# Patient Record
Sex: FEMALE | Race: WHITE | NOT HISPANIC OR LATINO | Employment: UNEMPLOYED | URBAN - METROPOLITAN AREA
[De-identification: names, ages, dates, MRNs, and addresses within clinical notes are randomized per-mention and may not be internally consistent; named-entity substitution may affect disease eponyms.]

---

## 2023-02-06 ENCOUNTER — HOSPITAL ENCOUNTER (INPATIENT)
Facility: HOSPITAL | Age: 53
LOS: 11 days | Discharge: HOME | DRG: 189 | End: 2023-02-17
Attending: INTERNAL MEDICINE | Admitting: INTERNAL MEDICINE
Payer: COMMERCIAL

## 2023-02-06 DIAGNOSIS — R94.31 QT PROLONGATION: ICD-10-CM

## 2023-02-06 DIAGNOSIS — R04.2 HEMOPTYSIS: ICD-10-CM

## 2023-02-06 DIAGNOSIS — I48.92 ATRIAL FLUTTER: ICD-10-CM

## 2023-02-06 DIAGNOSIS — J96.01 ACUTE HYPOXEMIC RESPIRATORY FAILURE: Primary | ICD-10-CM

## 2023-02-06 DIAGNOSIS — R00.0 TACHYCARDIA: ICD-10-CM

## 2023-02-06 PROBLEM — I10 ESSENTIAL HYPERTENSION: Status: ACTIVE | Noted: 2023-02-06

## 2023-02-06 PROBLEM — D64.9 ANEMIA: Status: ACTIVE | Noted: 2023-02-06

## 2023-02-06 PROBLEM — E07.9 THYROID DISEASE: Status: ACTIVE | Noted: 2023-02-06

## 2023-02-06 PROBLEM — R91.8 LUNG MASS: Status: ACTIVE | Noted: 2023-02-06

## 2023-02-06 PROBLEM — R91.8 LUNG MASS: Status: RESOLVED | Noted: 2023-02-06 | Resolved: 2023-02-06

## 2023-02-06 LAB
ANISOCYTOSIS BLD QL SMEAR: SLIGHT
APTT BLDCRRT: 23.6 SEC (ref 21–32)
BASOPHILS # BLD AUTO: 0.07 K/UL (ref 0–0.2)
BASOPHILS NFR BLD: 0.2 % (ref 0–1.9)
BURR CELLS BLD QL SMEAR: ABNORMAL
DIFFERENTIAL METHOD: ABNORMAL
EOSINOPHIL # BLD AUTO: 0.1 K/UL (ref 0–0.5)
EOSINOPHIL NFR BLD: 0.2 % (ref 0–8)
ERYTHROCYTE [DISTWIDTH] IN BLOOD BY AUTOMATED COUNT: 17.5 % (ref 11.5–14.5)
HCT VFR BLD AUTO: 24.4 % (ref 37–48.5)
HGB BLD-MCNC: 7.9 G/DL (ref 12–16)
HYPOCHROMIA BLD QL SMEAR: ABNORMAL
IMM GRANULOCYTES # BLD AUTO: 0.64 K/UL (ref 0–0.04)
IMM GRANULOCYTES NFR BLD AUTO: 1.6 % (ref 0–0.5)
INR PPP: 1.2 (ref 0.8–1.2)
LYMPHOCYTES # BLD AUTO: 1.2 K/UL (ref 1–4.8)
LYMPHOCYTES NFR BLD: 3.2 % (ref 18–48)
MAGNESIUM SERPL-MCNC: 1.5 MG/DL (ref 1.6–2.6)
MCH RBC QN AUTO: 28.6 PG (ref 27–31)
MCHC RBC AUTO-ENTMCNC: 32.4 G/DL (ref 32–36)
MCV RBC AUTO: 88 FL (ref 82–98)
MONOCYTES # BLD AUTO: 1.3 K/UL (ref 0.3–1)
MONOCYTES NFR BLD: 3.3 % (ref 4–15)
NEUTROPHILS # BLD AUTO: 35.6 K/UL (ref 1.8–7.7)
NEUTROPHILS NFR BLD: 91.5 % (ref 38–73)
NRBC BLD-RTO: 0 /100 WBC
OVALOCYTES BLD QL SMEAR: ABNORMAL
PHOSPHATE SERPL-MCNC: 4.4 MG/DL (ref 2.7–4.5)
PLATELET # BLD AUTO: 803 K/UL (ref 150–450)
PLATELET BLD QL SMEAR: ABNORMAL
PMV BLD AUTO: 9.3 FL (ref 9.2–12.9)
POIKILOCYTOSIS BLD QL SMEAR: SLIGHT
POLYCHROMASIA BLD QL SMEAR: ABNORMAL
PROTHROMBIN TIME: 12.3 SEC (ref 9–12.5)
RBC # BLD AUTO: 2.76 M/UL (ref 4–5.4)
SCHISTOCYTES BLD QL SMEAR: ABNORMAL
WBC # BLD AUTO: 38.89 K/UL (ref 3.9–12.7)
WBC TOXIC VACUOLES BLD QL SMEAR: PRESENT

## 2023-02-06 PROCEDURE — 94761 N-INVAS EAR/PLS OXIMETRY MLT: CPT

## 2023-02-06 PROCEDURE — 85025 COMPLETE CBC W/AUTO DIFF WBC: CPT | Mod: 91

## 2023-02-06 PROCEDURE — 84100 ASSAY OF PHOSPHORUS: CPT

## 2023-02-06 PROCEDURE — 25000242 PHARM REV CODE 250 ALT 637 W/ HCPCS: Performed by: STUDENT IN AN ORGANIZED HEALTH CARE EDUCATION/TRAINING PROGRAM

## 2023-02-06 PROCEDURE — 85610 PROTHROMBIN TIME: CPT | Mod: 91

## 2023-02-06 PROCEDURE — 27000221 HC OXYGEN, UP TO 24 HOURS

## 2023-02-06 PROCEDURE — 87040 BLOOD CULTURE FOR BACTERIA: CPT | Mod: 59

## 2023-02-06 PROCEDURE — 99900035 HC TECH TIME PER 15 MIN (STAT)

## 2023-02-06 PROCEDURE — 63600175 PHARM REV CODE 636 W HCPCS: Performed by: STUDENT IN AN ORGANIZED HEALTH CARE EDUCATION/TRAINING PROGRAM

## 2023-02-06 PROCEDURE — 20000000 HC ICU ROOM

## 2023-02-06 PROCEDURE — 85730 THROMBOPLASTIN TIME PARTIAL: CPT

## 2023-02-06 PROCEDURE — 25000003 PHARM REV CODE 250: Performed by: STUDENT IN AN ORGANIZED HEALTH CARE EDUCATION/TRAINING PROGRAM

## 2023-02-06 PROCEDURE — 83735 ASSAY OF MAGNESIUM: CPT

## 2023-02-06 RX ORDER — IPRATROPIUM BROMIDE AND ALBUTEROL SULFATE 2.5; .5 MG/3ML; MG/3ML
3 SOLUTION RESPIRATORY (INHALATION) ONCE
Status: DISCONTINUED | OUTPATIENT
Start: 2023-02-06 | End: 2023-02-06

## 2023-02-06 RX ORDER — TRANEXAMIC ACID 100 MG/ML
500 INJECTION, SOLUTION INTRAVENOUS EVERY 8 HOURS
Status: DISCONTINUED | OUTPATIENT
Start: 2023-02-06 | End: 2023-02-06

## 2023-02-06 RX ORDER — CLONAZEPAM 0.5 MG/1
0.5 TABLET ORAL 2 TIMES DAILY
Status: DISCONTINUED | OUTPATIENT
Start: 2023-02-06 | End: 2023-02-07

## 2023-02-06 RX ORDER — BUPROPION HYDROCHLORIDE 150 MG/1
300 TABLET ORAL DAILY
Status: DISCONTINUED | OUTPATIENT
Start: 2023-02-07 | End: 2023-02-17 | Stop reason: HOSPADM

## 2023-02-06 RX ORDER — GABAPENTIN 400 MG/1
400 CAPSULE ORAL 3 TIMES DAILY
Status: DISCONTINUED | OUTPATIENT
Start: 2023-02-06 | End: 2023-02-17 | Stop reason: HOSPADM

## 2023-02-06 RX ORDER — ONDANSETRON 4 MG/1
4 TABLET, ORALLY DISINTEGRATING ORAL EVERY 8 HOURS PRN
Status: DISCONTINUED | OUTPATIENT
Start: 2023-02-06 | End: 2023-02-17 | Stop reason: HOSPADM

## 2023-02-06 RX ORDER — TIZANIDINE 4 MG/1
4 TABLET ORAL 2 TIMES DAILY
Status: DISCONTINUED | OUTPATIENT
Start: 2023-02-06 | End: 2023-02-07

## 2023-02-06 RX ORDER — ACETAMINOPHEN 325 MG/1
650 TABLET ORAL EVERY 6 HOURS PRN
Status: DISCONTINUED | OUTPATIENT
Start: 2023-02-06 | End: 2023-02-17 | Stop reason: HOSPADM

## 2023-02-06 RX ORDER — IPRATROPIUM BROMIDE AND ALBUTEROL SULFATE 2.5; .5 MG/3ML; MG/3ML
3 SOLUTION RESPIRATORY (INHALATION) EVERY 4 HOURS PRN
Status: DISCONTINUED | OUTPATIENT
Start: 2023-02-06 | End: 2023-02-17 | Stop reason: HOSPADM

## 2023-02-06 RX ORDER — TRANEXAMIC ACID 100 MG/ML
500 INJECTION, SOLUTION INTRAVENOUS 3 TIMES DAILY
Status: DISCONTINUED | OUTPATIENT
Start: 2023-02-07 | End: 2023-02-07

## 2023-02-06 RX ORDER — ALUMINUM HYDROXIDE, MAGNESIUM HYDROXIDE, AND SIMETHICONE 2400; 240; 2400 MG/30ML; MG/30ML; MG/30ML
30 SUSPENSION ORAL EVERY 6 HOURS PRN
Status: DISCONTINUED | OUTPATIENT
Start: 2023-02-06 | End: 2023-02-17 | Stop reason: HOSPADM

## 2023-02-06 RX ORDER — PROMETHAZINE HYDROCHLORIDE AND CODEINE PHOSPHATE 6.25; 1 MG/5ML; MG/5ML
5 SOLUTION ORAL EVERY 4 HOURS PRN
Status: DISCONTINUED | OUTPATIENT
Start: 2023-02-06 | End: 2023-02-17 | Stop reason: HOSPADM

## 2023-02-06 RX ORDER — LEVOFLOXACIN 750 MG/1
750 TABLET ORAL DAILY
Status: DISCONTINUED | OUTPATIENT
Start: 2023-02-07 | End: 2023-02-06

## 2023-02-06 RX ORDER — DULOXETIN HYDROCHLORIDE 60 MG/1
60 CAPSULE, DELAYED RELEASE ORAL 2 TIMES DAILY
Status: DISCONTINUED | OUTPATIENT
Start: 2023-02-06 | End: 2023-02-17 | Stop reason: HOSPADM

## 2023-02-06 RX ORDER — IPRATROPIUM BROMIDE AND ALBUTEROL SULFATE 2.5; .5 MG/3ML; MG/3ML
3 SOLUTION RESPIRATORY (INHALATION) ONCE
Status: COMPLETED | OUTPATIENT
Start: 2023-02-06 | End: 2023-02-06

## 2023-02-06 RX ORDER — SODIUM CHLORIDE 0.9 % (FLUSH) 0.9 %
10 SYRINGE (ML) INJECTION
Status: DISCONTINUED | OUTPATIENT
Start: 2023-02-06 | End: 2023-02-17 | Stop reason: HOSPADM

## 2023-02-06 RX ORDER — ATORVASTATIN CALCIUM 20 MG/1
20 TABLET, FILM COATED ORAL DAILY
Status: DISCONTINUED | OUTPATIENT
Start: 2023-02-07 | End: 2023-02-17 | Stop reason: HOSPADM

## 2023-02-06 RX ORDER — LEVOFLOXACIN 750 MG/1
750 TABLET ORAL DAILY
Status: DISCONTINUED | OUTPATIENT
Start: 2023-02-07 | End: 2023-02-17

## 2023-02-06 RX ORDER — FUROSEMIDE 10 MG/ML
40 INJECTION INTRAMUSCULAR; INTRAVENOUS ONCE
Status: COMPLETED | OUTPATIENT
Start: 2023-02-06 | End: 2023-02-06

## 2023-02-06 RX ORDER — LORAZEPAM 2 MG/ML
0.5 INJECTION INTRAMUSCULAR ONCE
Status: DISCONTINUED | OUTPATIENT
Start: 2023-02-07 | End: 2023-02-07

## 2023-02-06 RX ORDER — CLONAZEPAM 0.5 MG/1
0.5 TABLET ORAL 2 TIMES DAILY PRN
Status: DISCONTINUED | OUTPATIENT
Start: 2023-02-06 | End: 2023-02-06

## 2023-02-06 RX ORDER — LEVOTHYROXINE SODIUM 50 UG/1
50 TABLET ORAL
Status: DISCONTINUED | OUTPATIENT
Start: 2023-02-07 | End: 2023-02-17 | Stop reason: HOSPADM

## 2023-02-06 RX ORDER — LOSARTAN POTASSIUM 50 MG/1
100 TABLET ORAL DAILY
Status: DISCONTINUED | OUTPATIENT
Start: 2023-02-07 | End: 2023-02-16

## 2023-02-06 RX ORDER — TIZANIDINE 4 MG/1
4 TABLET ORAL 2 TIMES DAILY PRN
Status: DISCONTINUED | OUTPATIENT
Start: 2023-02-06 | End: 2023-02-06

## 2023-02-06 RX ORDER — TALC
9 POWDER (GRAM) TOPICAL NIGHTLY
Status: DISCONTINUED | OUTPATIENT
Start: 2023-02-06 | End: 2023-02-17 | Stop reason: HOSPADM

## 2023-02-06 RX ADMIN — GABAPENTIN 400 MG: 400 CAPSULE ORAL at 09:02

## 2023-02-06 RX ADMIN — CLONAZEPAM 0.5 MG: 0.5 TABLET ORAL at 09:02

## 2023-02-06 RX ADMIN — FUROSEMIDE 40 MG: 10 INJECTION, SOLUTION INTRAMUSCULAR; INTRAVENOUS at 09:02

## 2023-02-06 RX ADMIN — Medication 9 MG: at 09:02

## 2023-02-06 RX ADMIN — IPRATROPIUM BROMIDE AND ALBUTEROL SULFATE 3 ML: .5; 3 SOLUTION RESPIRATORY (INHALATION) at 11:02

## 2023-02-06 RX ADMIN — AMITRIPTYLINE HYDROCHLORIDE 75 MG: 50 TABLET, FILM COATED ORAL at 09:02

## 2023-02-06 RX ADMIN — PROMETHAZINE HYDROCHLORIDE AND CODEINE PHOSPHATE 5 ML: 6.25; 1 SOLUTION ORAL at 10:02

## 2023-02-06 RX ADMIN — TRANEXAMIC ACID 500 MG: 100 INJECTION, SOLUTION INTRAVENOUS at 11:02

## 2023-02-06 RX ADMIN — DULOXETINE 60 MG: 60 CAPSULE, DELAYED RELEASE ORAL at 10:02

## 2023-02-06 RX ADMIN — ACETAMINOPHEN 650 MG: 325 TABLET ORAL at 10:02

## 2023-02-06 RX ADMIN — TIZANIDINE 4 MG: 4 TABLET ORAL at 09:02

## 2023-02-07 PROBLEM — F32.A ANXIETY AND DEPRESSION: Status: ACTIVE | Noted: 2023-02-07

## 2023-02-07 PROBLEM — G62.9 NEUROPATHY: Status: ACTIVE | Noted: 2023-02-07

## 2023-02-07 PROBLEM — F41.9 ANXIETY AND DEPRESSION: Status: ACTIVE | Noted: 2023-02-07

## 2023-02-07 LAB
ABO + RH BLD: NORMAL
ACID FAST MOD KINY STN SPEC: NORMAL
ALBUMIN SERPL BCP-MCNC: 1.5 G/DL (ref 3.5–5.2)
ALP SERPL-CCNC: 257 U/L (ref 55–135)
ALT SERPL W/O P-5'-P-CCNC: 24 U/L (ref 10–44)
ANION GAP SERPL CALC-SCNC: 15 MMOL/L (ref 8–16)
ANISOCYTOSIS BLD QL SMEAR: SLIGHT
APTT BLDCRRT: 29.9 SEC (ref 21–32)
AST SERPL-CCNC: 15 U/L (ref 10–40)
BASO STIPL BLD QL SMEAR: ABNORMAL
BASOPHILS NFR BLD: 0 % (ref 0–1.9)
BILIRUB SERPL-MCNC: 0.7 MG/DL (ref 0.1–1)
BLD GP AB SCN CELLS X3 SERPL QL: NORMAL
BLD PROD TYP BPU: NORMAL
BLOOD UNIT EXPIRATION DATE: NORMAL
BLOOD UNIT TYPE CODE: 6200
BLOOD UNIT TYPE: NORMAL
BUN SERPL-MCNC: 23 MG/DL (ref 6–20)
BURR CELLS BLD QL SMEAR: ABNORMAL
CALCIUM SERPL-MCNC: 8.8 MG/DL (ref 8.7–10.5)
CHLORIDE SERPL-SCNC: 99 MMOL/L (ref 95–110)
CO2 SERPL-SCNC: 24 MMOL/L (ref 23–29)
CODING SYSTEM: NORMAL
CREAT SERPL-MCNC: 0.8 MG/DL (ref 0.5–1.4)
CROSSMATCH INTERPRETATION: NORMAL
DIFFERENTIAL METHOD: ABNORMAL
DISPENSE STATUS: NORMAL
EOSINOPHIL NFR BLD: 0 % (ref 0–8)
ERYTHROCYTE [DISTWIDTH] IN BLOOD BY AUTOMATED COUNT: 17.5 % (ref 11.5–14.5)
EST. GFR  (NO RACE VARIABLE): >60 ML/MIN/1.73 M^2
GLUCOSE SERPL-MCNC: 160 MG/DL (ref 70–110)
HCT VFR BLD AUTO: 21.2 % (ref 37–48.5)
HGB BLD-MCNC: 6.7 G/DL (ref 12–16)
HYPOCHROMIA BLD QL SMEAR: ABNORMAL
IMM GRANULOCYTES # BLD AUTO: ABNORMAL K/UL (ref 0–0.04)
IMM GRANULOCYTES NFR BLD AUTO: ABNORMAL % (ref 0–0.5)
INR PPP: 1.3 (ref 0.8–1.2)
KOH PREP SPEC: NORMAL
LYMPHOCYTES NFR BLD: 8 % (ref 18–48)
MAGNESIUM SERPL-MCNC: 1.5 MG/DL (ref 1.6–2.6)
MCH RBC QN AUTO: 28 PG (ref 27–31)
MCHC RBC AUTO-ENTMCNC: 31.6 G/DL (ref 32–36)
MCV RBC AUTO: 89 FL (ref 82–98)
MONOCYTES NFR BLD: 4 % (ref 4–15)
MYELOCYTES NFR BLD MANUAL: 1 %
NEUTROPHILS NFR BLD: 80 % (ref 38–73)
NEUTS BAND NFR BLD MANUAL: 7 %
NRBC BLD-RTO: 0 /100 WBC
NUM UNITS TRANS PACKED RBC: NORMAL
OVALOCYTES BLD QL SMEAR: ABNORMAL
PHOSPHATE SERPL-MCNC: 6.4 MG/DL (ref 2.7–4.5)
PLATELET # BLD AUTO: 730 K/UL (ref 150–450)
PLATELET BLD QL SMEAR: ABNORMAL
PMV BLD AUTO: 8.8 FL (ref 9.2–12.9)
POCT GLUCOSE: 98 MG/DL (ref 70–110)
POIKILOCYTOSIS BLD QL SMEAR: SLIGHT
POLYCHROMASIA BLD QL SMEAR: ABNORMAL
POTASSIUM SERPL-SCNC: 4.7 MMOL/L (ref 3.5–5.1)
PROT SERPL-MCNC: 5.6 G/DL (ref 6–8.4)
PROTHROMBIN TIME: 13 SEC (ref 9–12.5)
RBC # BLD AUTO: 2.39 M/UL (ref 4–5.4)
SODIUM SERPL-SCNC: 138 MMOL/L (ref 136–145)
TARGETS BLD QL SMEAR: ABNORMAL
WBC # BLD AUTO: 32.21 K/UL (ref 3.9–12.7)

## 2023-02-07 PROCEDURE — 25000003 PHARM REV CODE 250: Performed by: STUDENT IN AN ORGANIZED HEALTH CARE EDUCATION/TRAINING PROGRAM

## 2023-02-07 PROCEDURE — 25000003 PHARM REV CODE 250: Performed by: INTERNAL MEDICINE

## 2023-02-07 PROCEDURE — 87147 CULTURE TYPE IMMUNOLOGIC: CPT

## 2023-02-07 PROCEDURE — 88305 TISSUE EXAM BY PATHOLOGIST: ICD-10-PCS | Mod: 26,,, | Performed by: PATHOLOGY

## 2023-02-07 PROCEDURE — 31624 PR BRONCHOSCOPY,DIAG2STIC W LAVAGE: ICD-10-PCS | Mod: RT,,, | Performed by: INTERNAL MEDICINE

## 2023-02-07 PROCEDURE — 36569 INSJ PICC 5 YR+ W/O IMAGING: CPT

## 2023-02-07 PROCEDURE — 20000000 HC ICU ROOM

## 2023-02-07 PROCEDURE — 88305 TISSUE EXAM BY PATHOLOGIST: CPT | Performed by: PATHOLOGY

## 2023-02-07 PROCEDURE — 87205 SMEAR GRAM STAIN: CPT | Mod: 59

## 2023-02-07 PROCEDURE — 88312 PR  SPECIAL STAINS,GROUP I: ICD-10-PCS | Mod: 26,,, | Performed by: PATHOLOGY

## 2023-02-07 PROCEDURE — 99900025 HC BRONCHOSCOPY-ASST (STAT)

## 2023-02-07 PROCEDURE — C1751 CATH, INF, PER/CENT/MIDLINE: HCPCS

## 2023-02-07 PROCEDURE — 63600175 PHARM REV CODE 636 W HCPCS

## 2023-02-07 PROCEDURE — 87210 SMEAR WET MOUNT SALINE/INK: CPT | Performed by: STUDENT IN AN ORGANIZED HEALTH CARE EDUCATION/TRAINING PROGRAM

## 2023-02-07 PROCEDURE — 85025 COMPLETE CBC W/AUTO DIFF WBC: CPT

## 2023-02-07 PROCEDURE — 86920 COMPATIBILITY TEST SPIN: CPT | Performed by: STUDENT IN AN ORGANIZED HEALTH CARE EDUCATION/TRAINING PROGRAM

## 2023-02-07 PROCEDURE — 99291 CRITICAL CARE FIRST HOUR: CPT | Mod: 25,,, | Performed by: INTERNAL MEDICINE

## 2023-02-07 PROCEDURE — 99900026 HC AIRWAY MAINTENANCE (STAT)

## 2023-02-07 PROCEDURE — 87205 SMEAR GRAM STAIN: CPT | Performed by: STUDENT IN AN ORGANIZED HEALTH CARE EDUCATION/TRAINING PROGRAM

## 2023-02-07 PROCEDURE — 84100 ASSAY OF PHOSPHORUS: CPT

## 2023-02-07 PROCEDURE — 87015 SPECIMEN INFECT AGNT CONCNTJ: CPT | Performed by: STUDENT IN AN ORGANIZED HEALTH CARE EDUCATION/TRAINING PROGRAM

## 2023-02-07 PROCEDURE — 87102 FUNGUS ISOLATION CULTURE: CPT | Performed by: STUDENT IN AN ORGANIZED HEALTH CARE EDUCATION/TRAINING PROGRAM

## 2023-02-07 PROCEDURE — 31624 DX BRONCHOSCOPE/LAVAGE: CPT | Mod: RT,,, | Performed by: INTERNAL MEDICINE

## 2023-02-07 PROCEDURE — 85610 PROTHROMBIN TIME: CPT

## 2023-02-07 PROCEDURE — 94761 N-INVAS EAR/PLS OXIMETRY MLT: CPT

## 2023-02-07 PROCEDURE — 88112 CYTOPATH CELL ENHANCE TECH: CPT | Mod: 26,,, | Performed by: PATHOLOGY

## 2023-02-07 PROCEDURE — 87070 CULTURE OTHR SPECIMN AEROBIC: CPT

## 2023-02-07 PROCEDURE — 99900035 HC TECH TIME PER 15 MIN (STAT)

## 2023-02-07 PROCEDURE — 88312 SPECIAL STAINS GROUP 1: CPT | Mod: 26,,, | Performed by: PATHOLOGY

## 2023-02-07 PROCEDURE — 88112 PR  CYTOPATH, CELL ENHANCE TECH: ICD-10-PCS | Mod: 26,,, | Performed by: PATHOLOGY

## 2023-02-07 PROCEDURE — 83735 ASSAY OF MAGNESIUM: CPT

## 2023-02-07 PROCEDURE — 25000242 PHARM REV CODE 250 ALT 637 W/ HCPCS: Performed by: STUDENT IN AN ORGANIZED HEALTH CARE EDUCATION/TRAINING PROGRAM

## 2023-02-07 PROCEDURE — 99291 PR CRITICAL CARE, E/M 30-74 MINUTES: ICD-10-PCS | Mod: 25,,, | Performed by: INTERNAL MEDICINE

## 2023-02-07 PROCEDURE — 25000003 PHARM REV CODE 250

## 2023-02-07 PROCEDURE — 87449 NOS EACH ORGANISM AG IA: CPT | Performed by: STUDENT IN AN ORGANIZED HEALTH CARE EDUCATION/TRAINING PROGRAM

## 2023-02-07 PROCEDURE — 25000003 PHARM REV CODE 250: Performed by: NURSE PRACTITIONER

## 2023-02-07 PROCEDURE — 80053 COMPREHEN METABOLIC PANEL: CPT

## 2023-02-07 PROCEDURE — 87206 SMEAR FLUORESCENT/ACID STAI: CPT | Mod: 91 | Performed by: STUDENT IN AN ORGANIZED HEALTH CARE EDUCATION/TRAINING PROGRAM

## 2023-02-07 PROCEDURE — P9016 RBC LEUKOCYTES REDUCED: HCPCS | Performed by: STUDENT IN AN ORGANIZED HEALTH CARE EDUCATION/TRAINING PROGRAM

## 2023-02-07 PROCEDURE — 63600175 PHARM REV CODE 636 W HCPCS: Performed by: STUDENT IN AN ORGANIZED HEALTH CARE EDUCATION/TRAINING PROGRAM

## 2023-02-07 PROCEDURE — 31624 DX BRONCHOSCOPE/LAVAGE: CPT

## 2023-02-07 PROCEDURE — 87305 ASPERGILLUS AG IA: CPT | Performed by: STUDENT IN AN ORGANIZED HEALTH CARE EDUCATION/TRAINING PROGRAM

## 2023-02-07 PROCEDURE — 87116 MYCOBACTERIA CULTURE: CPT | Performed by: STUDENT IN AN ORGANIZED HEALTH CARE EDUCATION/TRAINING PROGRAM

## 2023-02-07 PROCEDURE — 36430 TRANSFUSION BLD/BLD COMPNT: CPT

## 2023-02-07 PROCEDURE — 27000221 HC OXYGEN, UP TO 24 HOURS

## 2023-02-07 PROCEDURE — 88112 CYTOPATH CELL ENHANCE TECH: CPT | Performed by: PATHOLOGY

## 2023-02-07 PROCEDURE — 86900 BLOOD TYPING SEROLOGIC ABO: CPT | Performed by: STUDENT IN AN ORGANIZED HEALTH CARE EDUCATION/TRAINING PROGRAM

## 2023-02-07 PROCEDURE — 87206 SMEAR FLUORESCENT/ACID STAI: CPT | Performed by: STUDENT IN AN ORGANIZED HEALTH CARE EDUCATION/TRAINING PROGRAM

## 2023-02-07 PROCEDURE — 87106 FUNGI IDENTIFICATION YEAST: CPT | Performed by: STUDENT IN AN ORGANIZED HEALTH CARE EDUCATION/TRAINING PROGRAM

## 2023-02-07 PROCEDURE — 85730 THROMBOPLASTIN TIME PARTIAL: CPT

## 2023-02-07 PROCEDURE — 88312 SPECIAL STAINS GROUP 1: CPT | Performed by: PATHOLOGY

## 2023-02-07 PROCEDURE — 88305 TISSUE EXAM BY PATHOLOGIST: CPT | Mod: 26,,, | Performed by: PATHOLOGY

## 2023-02-07 PROCEDURE — A4216 STERILE WATER/SALINE, 10 ML: HCPCS | Performed by: NURSE PRACTITIONER

## 2023-02-07 PROCEDURE — 94640 AIRWAY INHALATION TREATMENT: CPT

## 2023-02-07 PROCEDURE — 63600175 PHARM REV CODE 636 W HCPCS: Performed by: INTERNAL MEDICINE

## 2023-02-07 RX ORDER — MIDAZOLAM HYDROCHLORIDE 1 MG/ML
INJECTION INTRAMUSCULAR; INTRAVENOUS
Status: COMPLETED
Start: 2023-02-07 | End: 2023-02-07

## 2023-02-07 RX ORDER — LIDOCAINE HYDROCHLORIDE 10 MG/ML
INJECTION INFILTRATION; PERINEURAL
Status: DISPENSED
Start: 2023-02-07 | End: 2023-02-08

## 2023-02-07 RX ORDER — SODIUM CHLORIDE 0.9 % (FLUSH) 0.9 %
10 SYRINGE (ML) INJECTION
Status: DISCONTINUED | OUTPATIENT
Start: 2023-02-07 | End: 2023-02-17 | Stop reason: HOSPADM

## 2023-02-07 RX ORDER — MUPIROCIN 20 MG/G
OINTMENT TOPICAL 2 TIMES DAILY
Status: DISPENSED | OUTPATIENT
Start: 2023-02-07 | End: 2023-02-12

## 2023-02-07 RX ORDER — FENTANYL CITRATE 50 UG/ML
INJECTION, SOLUTION INTRAMUSCULAR; INTRAVENOUS
Status: COMPLETED
Start: 2023-02-07 | End: 2023-02-07

## 2023-02-07 RX ORDER — LIDOCAINE HYDROCHLORIDE 20 MG/ML
5 SOLUTION OROPHARYNGEAL EVERY 6 HOURS
Status: DISCONTINUED | OUTPATIENT
Start: 2023-02-07 | End: 2023-02-08

## 2023-02-07 RX ORDER — MAGNESIUM SULFATE HEPTAHYDRATE 40 MG/ML
2 INJECTION, SOLUTION INTRAVENOUS
Status: COMPLETED | OUTPATIENT
Start: 2023-02-07 | End: 2023-02-07

## 2023-02-07 RX ORDER — LIDOCAINE HYDROCHLORIDE 10 MG/ML
INJECTION, SOLUTION EPIDURAL; INFILTRATION; INTRACAUDAL; PERINEURAL
Status: COMPLETED
Start: 2023-02-07 | End: 2023-02-07

## 2023-02-07 RX ORDER — CLONAZEPAM 0.5 MG/1
0.5 TABLET ORAL 3 TIMES DAILY PRN
Status: DISCONTINUED | OUTPATIENT
Start: 2023-02-07 | End: 2023-02-17 | Stop reason: HOSPADM

## 2023-02-07 RX ORDER — FENTANYL CITRATE 50 UG/ML
50 INJECTION, SOLUTION INTRAMUSCULAR; INTRAVENOUS ONCE
Status: COMPLETED | OUTPATIENT
Start: 2023-02-07 | End: 2023-02-07

## 2023-02-07 RX ORDER — MIDAZOLAM HYDROCHLORIDE 1 MG/ML
2 INJECTION INTRAMUSCULAR; INTRAVENOUS ONCE
Status: COMPLETED | OUTPATIENT
Start: 2023-02-07 | End: 2023-02-07

## 2023-02-07 RX ORDER — HYDROCODONE BITARTRATE AND ACETAMINOPHEN 500; 5 MG/1; MG/1
TABLET ORAL
Status: DISCONTINUED | OUTPATIENT
Start: 2023-02-07 | End: 2023-02-10

## 2023-02-07 RX ORDER — SODIUM CHLORIDE 0.9 % (FLUSH) 0.9 %
10 SYRINGE (ML) INJECTION EVERY 6 HOURS
Status: DISCONTINUED | OUTPATIENT
Start: 2023-02-07 | End: 2023-02-17 | Stop reason: HOSPADM

## 2023-02-07 RX ORDER — MIDAZOLAM HYDROCHLORIDE 1 MG/ML
6 INJECTION INTRAMUSCULAR; INTRAVENOUS
Status: DISCONTINUED | OUTPATIENT
Start: 2023-02-07 | End: 2023-02-08

## 2023-02-07 RX ORDER — FENTANYL CITRATE 50 UG/ML
150 INJECTION, SOLUTION INTRAMUSCULAR; INTRAVENOUS
Status: DISCONTINUED | OUTPATIENT
Start: 2023-02-07 | End: 2023-02-08

## 2023-02-07 RX ORDER — TIZANIDINE 2 MG/1
4 TABLET ORAL 2 TIMES DAILY PRN
Status: DISCONTINUED | OUTPATIENT
Start: 2023-02-07 | End: 2023-02-17 | Stop reason: HOSPADM

## 2023-02-07 RX ADMIN — PROMETHAZINE HYDROCHLORIDE AND CODEINE PHOSPHATE 5 ML: 6.25; 1 SOLUTION ORAL at 01:02

## 2023-02-07 RX ADMIN — AMITRIPTYLINE HYDROCHLORIDE 75 MG: 50 TABLET, FILM COATED ORAL at 09:02

## 2023-02-07 RX ADMIN — ATORVASTATIN CALCIUM 20 MG: 20 TABLET, FILM COATED ORAL at 08:02

## 2023-02-07 RX ADMIN — MIDAZOLAM HYDROCHLORIDE 2 MG: 1 INJECTION INTRAMUSCULAR; INTRAVENOUS at 04:02

## 2023-02-07 RX ADMIN — TRANEXAMIC ACID 500 MG: 100 INJECTION, SOLUTION INTRAVENOUS at 08:02

## 2023-02-07 RX ADMIN — DULOXETINE 60 MG: 60 CAPSULE, DELAYED RELEASE ORAL at 08:02

## 2023-02-07 RX ADMIN — Medication 10 ML: at 12:02

## 2023-02-07 RX ADMIN — IPRATROPIUM BROMIDE AND ALBUTEROL SULFATE 3 ML: .5; 3 SOLUTION RESPIRATORY (INHALATION) at 02:02

## 2023-02-07 RX ADMIN — VANCOMYCIN HYDROCHLORIDE 1500 MG: 1.5 INJECTION, POWDER, LYOPHILIZED, FOR SOLUTION INTRAVENOUS at 03:02

## 2023-02-07 RX ADMIN — LIDOCAINE HYDROCHLORIDE: 10 INJECTION, SOLUTION EPIDURAL; INFILTRATION; INTRACAUDAL at 05:02

## 2023-02-07 RX ADMIN — CLONAZEPAM 0.5 MG: 0.5 TABLET ORAL at 09:02

## 2023-02-07 RX ADMIN — Medication 10 ML: at 06:02

## 2023-02-07 RX ADMIN — LOSARTAN POTASSIUM 100 MG: 50 TABLET, FILM COATED ORAL at 08:02

## 2023-02-07 RX ADMIN — FENTANYL CITRATE 150 MCG: 50 INJECTION, SOLUTION INTRAMUSCULAR; INTRAVENOUS at 05:02

## 2023-02-07 RX ADMIN — LIDOCAINE HYDROCHLORIDE 5 ML: 20 SOLUTION ORAL; TOPICAL at 06:02

## 2023-02-07 RX ADMIN — LEVOTHYROXINE SODIUM 50 MCG: 50 TABLET ORAL at 05:02

## 2023-02-07 RX ADMIN — BUPROPION HYDROCHLORIDE 300 MG: 150 TABLET, FILM COATED, EXTENDED RELEASE ORAL at 08:02

## 2023-02-07 RX ADMIN — LEVOFLOXACIN 750 MG: 750 TABLET, FILM COATED ORAL at 08:02

## 2023-02-07 RX ADMIN — FENTANYL CITRATE 50 MCG: 50 INJECTION, SOLUTION INTRAMUSCULAR; INTRAVENOUS at 04:02

## 2023-02-07 RX ADMIN — MUPIROCIN: 20 OINTMENT TOPICAL at 08:02

## 2023-02-07 RX ADMIN — MAGNESIUM SULFATE 2 G: 2 INJECTION INTRAVENOUS at 08:02

## 2023-02-07 RX ADMIN — FENTANYL CITRATE 50 MCG: 50 INJECTION INTRAMUSCULAR; INTRAVENOUS at 04:02

## 2023-02-07 RX ADMIN — TRANEXAMIC ACID 500 MG: 100 INJECTION, SOLUTION INTRAVENOUS at 02:02

## 2023-02-07 RX ADMIN — GABAPENTIN 400 MG: 400 CAPSULE ORAL at 08:02

## 2023-02-07 RX ADMIN — GABAPENTIN 400 MG: 400 CAPSULE ORAL at 09:02

## 2023-02-07 RX ADMIN — CLONAZEPAM 0.5 MG: 0.5 TABLET ORAL at 08:02

## 2023-02-07 RX ADMIN — MIDAZOLAM HYDROCHLORIDE 2 MG: 1 INJECTION INTRAMUSCULAR; INTRAVENOUS at 05:02

## 2023-02-07 RX ADMIN — DULOXETINE 60 MG: 60 CAPSULE, DELAYED RELEASE ORAL at 09:02

## 2023-02-07 RX ADMIN — TIZANIDINE 4 MG: 4 TABLET ORAL at 08:02

## 2023-02-07 RX ADMIN — MAGNESIUM SULFATE 2 G: 2 INJECTION INTRAVENOUS at 10:02

## 2023-02-07 RX ADMIN — MIDAZOLAM 2 MG: 1 INJECTION INTRAMUSCULAR; INTRAVENOUS at 04:02

## 2023-02-07 NOTE — PLAN OF CARE
[facility] - Cardiac Medical [facility]  Initial Discharge Assessment       Primary Care Provider: Primary Doctor No    Admission Diagnosis: Hemoptysis [R04.2]    Admission Date: 2/6/2023  Expected Discharge Date: 2/11/2023    Discharge Barriers Identified: None    Payor: BLUE CROSS BLUE SHIELD / Plan: BCBS ALL OUT OF STATE / Product Type: PPO /     Extended Emergency Contact Information  Primary Emergency Contact: JAYNE ERNANDEZ  Mobile Phone: [phone]  Relation: Spouse  Preferred language: English   needed? No    Discharge Plan A: Home with family  Discharge Plan B: Home Health      [id number] - [place], [place] - [address]  [address]  [place] [address]  Phone: [phone] Fax: [phone]      Initial Assessment (most recent)       Adult Discharge Assessment - 02/07/23 1628          Discharge Assessment    Assessment Type Discharge Planning Assessment     Confirmed/corrected address, phone number and insurance Yes     Confirmed Demographics Correct on Facesheet     Source of Information family     If unable to respond/provide information was family/caregiver contacted? Yes     Contact Name/Number Jayne Ernandez spouse/cp# [phone]     When was your last doctors appointment? 12/21/22     Communicated MICHELE with patient/caregiver Date not available/Unable to determine     Reason For Admission Hemoptysis     People in Home spouse     Do you expect to return to your current living situation? Yes     Do you have help at home or someone to help you manage your care at home? Yes     Who are your caregiver(s) and their phone number(s)? Jayne Ernandez spouse/cp# [phone]     Prior to hospitilization cognitive status: Alert/Oriented     Current cognitive status: Alert/Oriented     Home Layout Able to live on 1st floor     Equipment Currently Used at Home none     Readmission within 30 days? No     Patient currently being followed by outpatient case management? No     Do you currently have service(s) that  help you manage your care at home? No     Do you take prescription medications? Yes     Do you have prescription coverage? Yes     Coverage BCBS/BCBS all out of state     Do you have any problems affording any of your prescribed medications? No     Is the patient taking medications as prescribed? yes     Who is going to help you get home at discharge? Spouse     How do you get to doctors appointments? car, drives self     Are you on dialysis? No     Do you take coumadin? No     Discharge Plan A Home with family     Discharge Plan B Home Health     DME Needed Upon Discharge  other (see comments)   TBD    Discharge Plan discussed with: Spouse/sig other     Name(s) and Number(s) Anthony Ernandez, /cp# [phone]     Discharge Barriers Identified None        Physical Activity    On average, how many days per week do you engage in moderate to strenuous exercise (like a brisk walk)? Patient refused     On average, how many minutes do you engage in exercise at this level? Patient refused        Financial Resource Strain    How hard is it for you to pay for the very basics like food, housing, medical care, and heating? Not hard at all        Housing Stability    In the last 12 months, was there a time when you were not able to pay the mortgage or rent on time? No     In the last 12 months, how many places have you lived? 1     In the last 12 months, was there a time when you did not have a steady place to sleep or slept in a shelter (including now)? No        Transportation Needs    In the past 12 months, has lack of transportation kept you from medical appointments or from getting medications? No     In the past 12 months, has lack of transportation kept you from meetings, work, or from getting things needed for daily living? No        Food Insecurity    Within the past 12 months, you worried that your food would run out before you got the money to buy more. Never true     Within the past 12 months, the food you  "bought just didn't last and you didn't have money to get more. Patient refused        Stress    Do you feel stress - tense, restless, nervous, or anxious, or unable to sleep at night because your mind is troubled all the time - these days? To some extent        Social Connections    In a typical week, how many times do you talk on the phone with family, friends, or neighbors? Three times a week     How often do you get together with friends or relatives? --   " not very often".    How often do you attend Oriental orthodox or Oriental orthodox services? --   " not in years".    Do you belong to any clubs or organizations such as Oriental orthodox groups, unions, fraternal or athletic groups, or school groups? No     How often do you attend meetings of the clubs or organizations you belong to? Never     Are you , , , , never , or living with a partner?         Alcohol Use    Q2: How many drinks containing alcohol do you have on a typical day when you are drinking? Patient does not drink        OTHER    Name(s) of People in Home Anthony Ernandez, spouse                   Spoke to Anthony Ernandez, spouse.  Patient lives with spouse. Post hospital stay Anthony will be her support person and help in the home.  Patient has transportation at discharge with spouse.  There have been no hospitalizations within the last 30 days per spouse. Verified patient's PCP and preferred Pharmacy.  Spouse states not on Coumadin and is not receiving dialysis.  All questions answered regarding Case Management Discharge Planning, spouse verbalized understanding.  SW will continue to follow and assist with post acute care need (s) while patient remains on MICU.    Vishal Lemon LMSW  [facility]  [phone]                     "

## 2023-02-07 NOTE — PLAN OF CARE
CMICU DAILY GOALS       A: Awake    RASS: Goal -    Actual -     Restraint necessity:    B: Breathe   SBT: Not intubated   C: Coordinate A & B, analgesics/sedatives   Pain: managed    SAT: Pass  D: Delirium   CAM-ICU:    E: Early(intubated/ Progressive (non-intubated) Mobility   MOVE Screen: Pass   Activity: Activity Management: Leg kicks - L2  FAS: Feeding/Nutrition   Diet order: Diet/Nutrition Received: NPO,    T: Thrombus   DVT prophylaxis: VTE Required Core Measure: (SCDs) Sequential compression device initiated/maintained  H: HOB Elevation   Head of Bed (HOB) Positioning: HOB at 30-45 degrees  U: Ulcer Prophylaxis   GI: yes  G: Glucose control   managed    S: Skin      Device Skin Pressure Protection: skin-to-device areas padded, skin-to-skin areas padded  Pressure Reduction Devices: specialty bed utilized  Pressure Reduction Techniques: frequent weight shift encouraged  Skin Protection: adhesive use limited  B: Bowel Function   no issues   I: Indwelling Catheters   Horton necessity:     CVC necessity: Yes  D: De-escalation Antibiotics   Yes    Family/Goals of care/Code Status   Code Status: Full Code    24H Vital Sign Range  Temp:  [97.6 °F (36.4 °C)-101.9 °F (38.8 °C)]   Pulse:  []   Resp:  [22-44]   BP: ()/(50-81)   SpO2:  [80 %-100 %]      Shift Events   No acute events throughout shift    VS and assessment per flow sheet, patient progressing towards goals as tolerated, plan of care reviewed with family, all concerns addressed, will continue to monitor.    Camron Hunter

## 2023-02-07 NOTE — ASSESSMENT & PLAN NOTE
Hg 6.8 at outside hospital, was given 1 unit pRBC   Here with hemoptysis   Repeat Hb noted at 6.7, receiving another unit of pRBC's    - CBC daily   - FU coags   - Transfuse if Hg <7, platelets <10, or platelets <50 and actively bleeding

## 2023-02-07 NOTE — ASSESSMENT & PLAN NOTE
Patient with cough for a week a coughed up a cup of blood in the last 24 hours   Not on blood thinners, non smoker, no lung disease history   CTA at outside hospital negative for PE but there was a solid mass in the superior aspect of the right lung concerning for malignancy.  Mass is partially necrotic with air bubbles peripherally.    At outside hospital Hg 6.8, was given 1 unit pRBC   Was also given nebulized TXA at outside hospital     - Nebulized TXA TID  - Monitor oxygen   - Intubation watch (if oxygen sats drop or unable to protect airway)  - Possible bronch with biopsy of mass   - Consult IR for possible emoblization if uncontrolled bleeding   - prn codeine-promethazine for cough  - Trend CBC   - monitor coags

## 2023-02-07 NOTE — SUBJECTIVE & OBJECTIVE
Interval History/Significant Events: Hb noted to be 6.7 overnight, receiving 1 unit of pRBC's. She fevered to 101.9 with a leukocytosis, continuing levofloxacin and vancomycin. Remains HDS and satting well on 4L NC. Patient reports improvement in cough since receiving cough syrup and states the last blood tinged sputum was noted at 3 am.     Review of Systems   Constitutional:  Negative for chills, fever and unexpected weight change.   HENT:  Positive for rhinorrhea and sore throat. Negative for congestion.    Eyes:  Negative for photophobia, pain and visual disturbance.   Respiratory:  Positive for cough (hemoptysis) and shortness of breath (RUSSELL). Negative for chest tightness and wheezing.    Cardiovascular:  Negative for chest pain and leg swelling.        Orthopnea   Gastrointestinal:  Negative for abdominal pain, blood in stool, diarrhea, nausea and vomiting.   Genitourinary:  Negative for dysuria, frequency and hematuria.        Stress incontinence, particularly with coughing   Neurological:  Negative for dizziness, syncope, light-headedness and headaches.   Psychiatric/Behavioral:  Negative for confusion and decreased concentration.    Objective:     Vital Signs (Most Recent):  Temp: 98 °F (36.7 °C) (02/07/23 0730)  Pulse: 98 (02/07/23 0801)  Resp: (!) 27 (02/07/23 0801)  BP: (!) 146/68 (02/07/23 0822)  SpO2: 100 % (02/07/23 0801)   Vital Signs (24h Range):  Temp:  [97.6 °F (36.4 °C)-101.9 °F (38.8 °C)] 98 °F (36.7 °C)  Pulse:  [] 98  Resp:  [22-44] 27  SpO2:  [93 %-100 %] 100 %  BP: ()/(50-81) 146/68   Weight: 99 kg (218 lb 4.1 oz)  Body mass index is 36.32 kg/m².      Intake/Output Summary (Last 24 hours) at 2/7/2023 0919  Last data filed at 2/7/2023 0701  Gross per 24 hour   Intake 248.66 ml   Output 450 ml   Net -201.34 ml       Physical Exam  Vitals and nursing note reviewed.   Constitutional:       General: She is not in acute distress.     Appearance: She is obese. She is not ill-appearing  or diaphoretic.   HENT:      Head: Normocephalic and atraumatic.      Right Ear: External ear normal.      Left Ear: External ear normal.      Nose: Nose normal.      Comments: Nasal cannula in place, no obvious bleeding in nares or mouth     Mouth/Throat:      Mouth: Mucous membranes are dry.      Pharynx: Oropharynx is clear. No oropharyngeal exudate or posterior oropharyngeal erythema.      Comments: No obvious bleeding in mouth or throat  Eyes:      General: No scleral icterus.        Right eye: No discharge.         Left eye: No discharge.      Extraocular Movements: Extraocular movements intact.      Conjunctiva/sclera: Conjunctivae normal.   Cardiovascular:      Rate and Rhythm: Normal rate and regular rhythm.      Heart sounds: Normal heart sounds. No murmur heard.  Pulmonary:      Effort: Pulmonary effort is normal. No respiratory distress.      Breath sounds: Wheezing (R > L) and rhonchi present. No rales.   Abdominal:      General: Abdomen is flat. There is no distension.      Palpations: Abdomen is soft. There is no mass.      Tenderness: There is no abdominal tenderness. There is no guarding or rebound.   Musculoskeletal:         General: No swelling or deformity. Normal range of motion.      Cervical back: Normal range of motion and neck supple.      Right lower leg: No edema.      Left lower leg: No edema.   Skin:     General: Skin is warm.      Coloration: Skin is pale. Skin is not jaundiced.      Findings: No bruising.   Neurological:      Mental Status: She is alert and oriented to person, place, and time.   Psychiatric:         Mood and Affect: Mood normal.         Behavior: Behavior normal.         Thought Content: Thought content normal.       Vents:     Lines/Drains/Airways       Peripherally Inserted Central Catheter Line  Duration             PICC Triple Lumen 02/07/23 0200 right basilic <1 day                  Significant Labs:    CBC/Anemia Profile:  Recent Labs   Lab 02/06/23  0835  02/06/23 1937 02/07/23  0246   WBC 34.89* 38.89* 32.21*   HGB 6.8* 7.9* 6.7*   HCT 21.2* 24.4* 21.2*   * 803* 730*   MCV 89 88 89   RDW 17.2* 17.5* 17.5*        Chemistries:  Recent Labs   Lab 02/06/23  0835 02/06/23 1937 02/07/23 0246     --  138   K 4.0  --  4.7     --  99   CO2 28  --  24   BUN 27*  --  23*   CREATININE 0.77  --  0.8   CALCIUM 9.1  --  8.8   ALBUMIN 3.3*  --  1.5*   PROT 7.3  --  5.6*   BILITOT 0.7  --  0.7   ALKPHOS 347*  --  257*   ALT 38  --  24   AST 31  --  15   MG  --  1.5* 1.5*   PHOS  --  4.4 6.4*       All pertinent labs within the past 24 hours have been reviewed.    Significant Imaging:  I have reviewed all pertinent imaging results/findings within the past 24 hours.

## 2023-02-07 NOTE — H&P
[facility] - Cardiac Medical ICU  Critical Care Medicine  History & Physical    Patient Name: Skyla Ernandez  MRN: [id number]  Admission Date: 2/6/2023  Hospital Length of Stay: 0 days  Code Status: Full Code  Attending Physician: Keely Vallejo MD   Primary Care Provider: Primary Doctor No   Principal Problem: Hemoptysis    Subjective:     HPI:  51 yo female with hypertension, hyperlipidemia, and thyroid disease who presented to [facility] Emergency Department this morning for hemoptysis. She reports 3 weeks of cough in which she first noticed blood tinged sputum 2 weeks ago. She reports that the cough has been productive of whitish brown sputum which became darker and eventually appeared dark red. The cough often interferes with sleep. She has also experienced shortness of breath over the past couple of days. She endorses sore throat, RUSSELL, and 2-3 pillow orthopnea. This morning she coughed up over a cup of bright red blood and decided to present to the ED. She denies any chronic tobacco history apart from occasional smoking cigarettes in her late teens. She denies piror homelessness, incarceration, close contacts with TB, or recent travel. She denies having a history of asthma, COPD, cirrhosis or nose bleeds but does report history of scoliosis.    Workup at the [facility] ED includes leukocytosis to 35K, hemoglobin of 6.8 now s/p 1 unit of pRBC's, thrombocytosis to 882, NT-proBNP 1640, troponin negative, and lactic acid 1.8. CXR diffuse patchy opacities scattered throughout the right middle and right lower lung zone which may be seen with pneumonia, aspiration, or edema. CTA of the chest showed no evidence of pulmonary embolism. Solid mass in the superior aspect of the right lung concerning for malignancy. Mass is partially necrotic with air bubbles peripherally. Mass abuts the pericardium and mediastinum. There is associated atelectasis/consolidation of the right middle lobe. Patchy opacities  in the right lower lobe as well as the left lung which is nonspecific but may represent atelectasis, infectious process, or hemorrhage. She was started on levofloxacin. She was noted to be tachycardic to 100's with soft pressures 87//73 and stable oxygenation on 2LNC.    Patient transferred to [facility] medical intensive care unit for higher level of care.      Hospital/ICU Course:  No notes on file     Past Medical History:   Diagnosis Date    Hypertension     Mixed hyperlipidemia     Thyroid disease        Past Surgical History:   Procedure Laterality Date    ABDOMINAL SURGERY      TONSILLECTOMY         Review of patient's allergies indicates:   Allergen Reactions    Penicillins Hives       Family History    None       Tobacco Use    Smoking status: Former     Types: Cigarettes    Smokeless tobacco: Never   Substance and Sexual Activity    Alcohol use: Never    Drug use: Not on file    Sexual activity: Not on file      Review of Systems   Constitutional:  Negative for chills, fever and unexpected weight change.   HENT:  Positive for rhinorrhea and sore throat. Negative for congestion.    Eyes:  Negative for photophobia, pain and visual disturbance.   Respiratory:  Positive for cough (hemoptysis) and shortness of breath (RUSSELL). Negative for chest tightness and wheezing.    Cardiovascular:  Negative for chest pain.        Orthopnea   Gastrointestinal:  Negative for abdominal pain, blood in stool, diarrhea, nausea and vomiting.   Genitourinary:  Negative for dysuria, frequency and hematuria.   Neurological:  Negative for dizziness, syncope, light-headedness and headaches.   Psychiatric/Behavioral:  Negative for confusion and decreased concentration.    Objective:     Vital Signs (Most Recent):  Temp: 98.9 °F (37.2 °C) (02/06/23 1836)  Pulse: (!) 127 (02/06/23 1836)  Resp: (!) 29 (02/06/23 1836)  BP: 137/70 (02/06/23 1836)  SpO2: 99 % (02/06/23 1836)   Vital Signs (24h Range):  Temp:  [97.6 °F (36.4 °C)-98.9  °F (37.2 °C)] 98.9 °F (37.2 °C)  Pulse:  [] 127  Resp:  [22-38] 29  SpO2:  [80 %-100 %] 99 %  BP: ()/(50-73) 137/70   Weight: 99 kg (218 lb 4.1 oz)  Body mass index is 36.32 kg/m².    No intake or output data in the 24 hours ending 02/06/23 1850    Physical Exam  Vitals and nursing note reviewed.   Constitutional:       General: She is not in acute distress.     Appearance: She is obese. She is not ill-appearing or diaphoretic.   HENT:      Head: Normocephalic and atraumatic.      Right Ear: External ear normal.      Left Ear: External ear normal.      Nose: Nose normal. No congestion or rhinorrhea.      Comments: Nasal cannula in place, no obvious bleeding in nares or mouth     Mouth/Throat:      Mouth: Mucous membranes are dry.      Pharynx: Oropharynx is clear. No oropharyngeal exudate or posterior oropharyngeal erythema.      Comments: No obvious bleeding in mouth or throat  Eyes:      General: No scleral icterus.        Right eye: No discharge.         Left eye: No discharge.      Extraocular Movements: Extraocular movements intact.      Conjunctiva/sclera: Conjunctivae normal.   Cardiovascular:      Rate and Rhythm: Regular rhythm. Tachycardia present.      Heart sounds: Normal heart sounds. No murmur heard.  Pulmonary:      Effort: Pulmonary effort is normal. No respiratory distress.      Breath sounds: Wheezing (L > R) and rhonchi present. No rales.   Abdominal:      General: Abdomen is flat. There is no distension.      Palpations: Abdomen is soft. There is no mass.      Tenderness: There is no abdominal tenderness.   Musculoskeletal:         General: No swelling or deformity. Normal range of motion.      Cervical back: Normal range of motion and neck supple.      Right lower leg: No edema.      Left lower leg: No edema.   Skin:     General: Skin is warm.      Coloration: Skin is pale. Skin is not jaundiced.      Findings: No bruising.   Neurological:      Mental Status: She is alert and  oriented to person, place, and time.   Psychiatric:         Mood and Affect: Mood normal.         Behavior: Behavior normal.         Thought Content: Thought content normal.       Vents:     Lines/Drains/Airways       None                 Significant Labs:    CBC/Anemia Profile:  Recent Labs   Lab 02/06/23  0835   WBC 34.89*   HGB 6.8*   HCT 21.2*   *   MCV 89   RDW 17.2*        Chemistries:  Recent Labs   Lab 02/06/23  0835      K 4.0      CO2 28   BUN 27*   CREATININE 0.77   CALCIUM 9.1   ALBUMIN 3.3*   PROT 7.3   BILITOT 0.7   ALKPHOS 347*   ALT 38   AST 31       All pertinent labs within the past 24 hours have been reviewed.    Significant Imaging: I have reviewed all pertinent imaging results/findings within the past 24 hours.    Assessment/Plan:     Pulmonary  * Hemoptysis  Patient with cough for a week a coughed up a cup of blood in the last 24 hours   Not on blood thinners, non smoker, no lung disease history   CTA at outside hospital negative for PE but there was a solid mass in the superior aspect of the right lung concerning for malignancy.  Mass is partially necrotic with air bubbles peripherally.    At outside hospital Hg 6.8, was given 1 unit pRBC   Was also given nebulized TXA at outside hospital     - Monitor oxygen   - Intubation watch (if oxygen sats drop or unable to protect airway)  - Possible bronch with biopsy of mass   - Consult IR for possible emoblization if bleeding does not stop   - Can consider TXA if bleeding does not stop   - Trend CBC   - FU coags     Acute hypoxemic respiratory failure  Likely due to hemoptysis, lung mass, and possible pneumonia     - Vanc/levofloxacin   - CBC daily   - Possible bronch   - Supplemental O2 as needed   - Intubation watch     Mass of upper lobe of right lung  CTA at outside hospital negative for PE but there was a solid mass in the superior aspect of the right lung concerning for malignancy.  Mass is partially necrotic with air bubbles  peripherally.    WBC 35 and lacate normal at outside hospital     - Vanc/levofloxacin   - FU cultures   - Possible bronch with biopsy     Cardiac/Vascular  Essential hypertension  - hold home antihypertensives in setting of soft blood pressures    Oncology  Anemia  Hg 6.8 at outside hospital, was given 1 unit pRBC   Here with hemoptysis     - CBC daily   - FU coags   - Transfuse if Hg <7, platelets <10, or platelets <50 and actively bleeding     Endocrine  Thyroid disease  - Continue home synthroid         Critical Care Daily Checklist:    A: Awake: RASS Goal/Actual Goal:    Actual:     B: Spontaneous Breathing Trial Performed?     C: SAT & SBT Coordinated?  NA                      D: Delirium: CAM-ICU     E: Early Mobility Performed? Yes   F: Feeding Goal:    Status:     Current Diet Order   Procedures    Diet NPO Except for: Medication, Sips with Medication, Ice Chips     Order Specific Question:   Except for     Answer:   Medication     Order Specific Question:   Except for     Answer:   Sips with Medication     Order Specific Question:   Except for     Answer:   Ice Chips      AS: Analgesia/Sedation NA   T: Thromboembolic Prophylaxis No   H: HOB > 300 Yes   U: Stress Ulcer Prophylaxis (if needed) No   G: Glucose Control No   B: Bowel Function     I: Indwelling Catheter (Lines & Horton) Necessity none   D: De-escalation of Antimicrobials/Pharmacotherapies Levofloxacin and vancomycin    Plan for the day/ETD Monitor O2 and CBC    Code Status:  Family/Goals of Care: Full Code       Critical secondary to Patient has a condition that poses threat to life and bodily function: hemoptysis     Critical care was time spent personally by me on the following activities: development of treatment plan with patient or surrogate and bedside caregivers, discussions with consultants, evaluation of patient's response to treatment, examination of patient, ordering and performing treatments and interventions, ordering and review of  laboratory studies, ordering and review of radiographic studies, pulse oximetry, re-evaluation of patient's condition. This critical care time did not overlap with that of any other provider or involve time for any procedures.     Justyna Aragon MD  Critical Care Medicine  [facility] - Cardiac Medical ICU

## 2023-02-07 NOTE — SUBJECTIVE & OBJECTIVE
Past Medical History:   Diagnosis Date    Hypertension     Mixed hyperlipidemia     Thyroid disease        Past Surgical History:   Procedure Laterality Date    ABDOMINAL SURGERY      TONSILLECTOMY         Review of patient's allergies indicates:   Allergen Reactions    Penicillins Hives       Family History    None       Tobacco Use    Smoking status: Former     Types: Cigarettes    Smokeless tobacco: Never   Substance and Sexual Activity    Alcohol use: Never    Drug use: Not on file    Sexual activity: Not on file      Review of Systems   Constitutional:  Negative for chills, fever and unexpected weight change.   HENT:  Positive for rhinorrhea and sore throat. Negative for congestion.    Eyes:  Negative for photophobia, pain and visual disturbance.   Respiratory:  Positive for cough (hemoptysis) and shortness of breath (RUSSELL). Negative for chest tightness and wheezing.    Cardiovascular:  Negative for chest pain.        Orthopnea   Gastrointestinal:  Negative for abdominal pain, blood in stool, diarrhea, nausea and vomiting.   Genitourinary:  Negative for dysuria, frequency and hematuria.   Neurological:  Negative for dizziness, syncope, light-headedness and headaches.   Psychiatric/Behavioral:  Negative for confusion and decreased concentration.    Objective:     Vital Signs (Most Recent):  Temp: 98.9 °F (37.2 °C) (02/06/23 1836)  Pulse: (!) 127 (02/06/23 1836)  Resp: (!) 29 (02/06/23 1836)  BP: 137/70 (02/06/23 1836)  SpO2: 99 % (02/06/23 1836)   Vital Signs (24h Range):  Temp:  [97.6 °F (36.4 °C)-98.9 °F (37.2 °C)] 98.9 °F (37.2 °C)  Pulse:  [] 127  Resp:  [22-38] 29  SpO2:  [80 %-100 %] 99 %  BP: ()/(50-73) 137/70   Weight: 99 kg (218 lb 4.1 oz)  Body mass index is 36.32 kg/m².    No intake or output data in the 24 hours ending 02/06/23 1850    Physical Exam  Vitals and nursing note reviewed.   Constitutional:       General: She is not in acute distress.     Appearance: She is obese. She is not  ill-appearing or diaphoretic.   HENT:      Head: Normocephalic and atraumatic.      Right Ear: External ear normal.      Left Ear: External ear normal.      Nose: Nose normal. No congestion or rhinorrhea.      Comments: Nasal cannula in place, no obvious bleeding in nares or mouth     Mouth/Throat:      Mouth: Mucous membranes are dry.      Pharynx: Oropharynx is clear. No oropharyngeal exudate or posterior oropharyngeal erythema.      Comments: No obvious bleeding in mouth or throat  Eyes:      General: No scleral icterus.        Right eye: No discharge.         Left eye: No discharge.      Extraocular Movements: Extraocular movements intact.      Conjunctiva/sclera: Conjunctivae normal.   Cardiovascular:      Rate and Rhythm: Regular rhythm. Tachycardia present.      Heart sounds: Normal heart sounds. No murmur heard.  Pulmonary:      Effort: Pulmonary effort is normal. No respiratory distress.      Breath sounds: Wheezing (L > R) and rhonchi present. No rales.   Abdominal:      General: Abdomen is flat. There is no distension.      Palpations: Abdomen is soft. There is no mass.      Tenderness: There is no abdominal tenderness.   Musculoskeletal:         General: No swelling or deformity. Normal range of motion.      Cervical back: Normal range of motion and neck supple.      Right lower leg: No edema.      Left lower leg: No edema.   Skin:     General: Skin is warm.      Coloration: Skin is pale. Skin is not jaundiced.      Findings: No bruising.   Neurological:      Mental Status: She is alert and oriented to person, place, and time.   Psychiatric:         Mood and Affect: Mood normal.         Behavior: Behavior normal.         Thought Content: Thought content normal.       Vents:     Lines/Drains/Airways       None                 Significant Labs:    CBC/Anemia Profile:  Recent Labs   Lab 02/06/23  0835   WBC 34.89*   HGB 6.8*   HCT 21.2*   *   MCV 89   RDW 17.2*        Chemistries:  Recent Labs   Lab  02/06/23  0835      K 4.0      CO2 28   BUN 27*   CREATININE 0.77   CALCIUM 9.1   ALBUMIN 3.3*   PROT 7.3   BILITOT 0.7   ALKPHOS 347*   ALT 38   AST 31       All pertinent labs within the past 24 hours have been reviewed.    Significant Imaging: I have reviewed all pertinent imaging results/findings within the past 24 hours.

## 2023-02-07 NOTE — HOSPITAL COURSE
Patient admitted to MICU for further evaluation and management of hemoptysis. Hb of 6.7, received a second unit of pRBC's as patient continued to have cough with blood tinged sputum. On levofloxacin and vancomycin given leukocytosis, fever, and concerned for pneumonia. Patient remains HDS and satting well on 4L NC. Hypoxic respiratory failure likely 2/2 pneumonia vs hemoptysis vs lung mass. S/p bronchoscopy on 2/7 with BAL performed which was significant for grossly normal airways apart from sticky thick white mucus. Results from BAL unremarkable so far and cultures NGTD. Patient continues to be HDS and satting well on LFNC. Hb stable without any further hemoptysis for over 24 hours. Patient is medically ready for step down to hospital medicine.

## 2023-02-07 NOTE — ASSESSMENT & PLAN NOTE
CTA at outside hospital negative for PE but there was a solid mass in the superior aspect of the right lung concerning for malignancy.  Mass is partially necrotic with air bubbles peripherally.    WBC 35 and lacate normal at outside hospital     - Vanc/levofloxacin   - FU cultures   - Possible bronch with biopsy

## 2023-02-07 NOTE — PROGRESS NOTES
[facility] - Cardiac Medical ICU  Critical Care Medicine  Progress Note    Patient Name: Skyla Ernandez  MRN: [id number]  Admission Date: 2/6/2023  Hospital Length of Stay: 1 days  Code Status: Full Code  Attending Provider: Keely Vallejo MD  Primary Care Provider: Primary Doctor No   Principal Problem: Hemoptysis    Subjective:     HPI:  51 yo female with hypertension, hyperlipidemia, and thyroid disease who presented to [facility] Emergency Department this morning for hemoptysis. She reports 3 weeks of cough in which she first noticed blood tinged sputum 2 weeks ago. She reports that the cough has been productive of whitish brown sputum which became darker and eventually appeared dark red. The cough often interferes with sleep. She has also experienced shortness of breath over the past couple of days. She endorses sore throat, RUSSELL, and 2-3 pillow orthopnea. This morning she coughed up over a cup of bright red blood and decided to present to the ED. She denies any chronic tobacco history apart from occasional smoking cigarettes in her late teens. She denies piror homelessness, incarceration, close contacts with TB, or recent travel. She denies having a history of asthma, COPD, cirrhosis or nose bleeds but does report history of scoliosis.    Workup at the [facility] ED includes leukocytosis to 35K, hemoglobin of 6.8 now s/p 1 unit of pRBC's, thrombocytosis to 882, NT-proBNP 1640, troponin negative, and lactic acid 1.8. CXR diffuse patchy opacities scattered throughout the right middle and right lower lung zone which may be seen with pneumonia, aspiration, or edema. CTA of the chest showed no evidence of pulmonary embolism. Solid mass in the superior aspect of the right lung concerning for malignancy. Mass is partially necrotic with air bubbles peripherally. Mass abuts the pericardium and mediastinum. There is associated atelectasis/consolidation of the right middle lobe. Patchy opacities in the  right lower lobe as well as the left lung which is nonspecific but may represent atelectasis, infectious process, or hemorrhage. She was started on levofloxacin. She was noted to be tachycardic to 100's with soft pressures 87//73 and stable oxygenation on 2LNC.    Patient transferred to [facility] medical intensive care unit for higher level of care.      Hospital/ICU Course:  Patient admitted to MICU for further evaluation and management of hemoptysis. Hb of 6.7, receiving a unit of pRBC's as patient continues to have cough with blood tinged sputum. On levofloxacin and vancomycin given leukocytosis, fever, and concerned for pneumonia. Patient remains HDS and satting well on 4L NC. Hypoxic respiratory failure likely 2/2 pneumonia vs hemoptysis vs lung mass. Will continue to monitor as intubation watch vs step down later today if patient remains stable with minimal hemoptysis.       Interval History/Significant Events: Hb noted to be 6.7 overnight, receiving 1 unit of pRBC's. She fevered to 101.9 with a leukocytosis, continuing levofloxacin and vancomycin. Remains HDS and satting well on 4L NC. Patient reports improvement in cough since receiving cough syrup and states the last blood tinged sputum was noted at 3 am.     Review of Systems   Constitutional:  Negative for chills, fever and unexpected weight change.   HENT:  Positive for rhinorrhea and sore throat. Negative for congestion.    Eyes:  Negative for photophobia, pain and visual disturbance.   Respiratory:  Positive for cough (hemoptysis) and shortness of breath (RUSSELL). Negative for chest tightness and wheezing.    Cardiovascular:  Negative for chest pain and leg swelling.        Orthopnea   Gastrointestinal:  Negative for abdominal pain, blood in stool, diarrhea, nausea and vomiting.   Genitourinary:  Negative for dysuria, frequency and hematuria.        Stress incontinence, particularly with coughing   Neurological:  Negative for dizziness, syncope,  light-headedness and headaches.   Psychiatric/Behavioral:  Negative for confusion and decreased concentration.    Objective:     Vital Signs (Most Recent):  Temp: 98 °F (36.7 °C) (02/07/23 0730)  Pulse: 98 (02/07/23 0801)  Resp: (!) 27 (02/07/23 0801)  BP: (!) 146/68 (02/07/23 0822)  SpO2: 100 % (02/07/23 0801)   Vital Signs (24h Range):  Temp:  [97.6 °F (36.4 °C)-101.9 °F (38.8 °C)] 98 °F (36.7 °C)  Pulse:  [] 98  Resp:  [22-44] 27  SpO2:  [93 %-100 %] 100 %  BP: ()/(50-81) 146/68   Weight: 99 kg (218 lb 4.1 oz)  Body mass index is 36.32 kg/m².      Intake/Output Summary (Last 24 hours) at 2/7/2023 0919  Last data filed at 2/7/2023 0701  Gross per 24 hour   Intake 248.66 ml   Output 450 ml   Net -201.34 ml       Physical Exam  Vitals and nursing note reviewed.   Constitutional:       General: She is not in acute distress.     Appearance: She is obese. She is not ill-appearing or diaphoretic.   HENT:      Head: Normocephalic and atraumatic.      Right Ear: External ear normal.      Left Ear: External ear normal.      Nose: Nose normal.      Comments: Nasal cannula in place, no obvious bleeding in nares or mouth     Mouth/Throat:      Mouth: Mucous membranes are dry.      Pharynx: Oropharynx is clear. No oropharyngeal exudate or posterior oropharyngeal erythema.      Comments: No obvious bleeding in mouth or throat  Eyes:      General: No scleral icterus.        Right eye: No discharge.         Left eye: No discharge.      Extraocular Movements: Extraocular movements intact.      Conjunctiva/sclera: Conjunctivae normal.   Cardiovascular:      Rate and Rhythm: Normal rate and regular rhythm.      Heart sounds: Normal heart sounds. No murmur heard.  Pulmonary:      Effort: Pulmonary effort is normal. No respiratory distress.      Breath sounds: Wheezing (R > L) and rhonchi present. No rales.   Abdominal:      General: Abdomen is flat. There is no distension.      Palpations: Abdomen is soft. There is no  mass.      Tenderness: There is no abdominal tenderness. There is no guarding or rebound.   Musculoskeletal:         General: No swelling or deformity. Normal range of motion.      Cervical back: Normal range of motion and neck supple.      Right lower leg: No edema.      Left lower leg: No edema.   Skin:     General: Skin is warm.      Coloration: Skin is pale. Skin is not jaundiced.      Findings: No bruising.   Neurological:      Mental Status: She is alert and oriented to person, place, and time.   Psychiatric:         Mood and Affect: Mood normal.         Behavior: Behavior normal.         Thought Content: Thought content normal.       Vents:     Lines/Drains/Airways       Peripherally Inserted Central Catheter Line  Duration             PICC Triple Lumen 02/07/23 0200 right basilic <1 day                  Significant Labs:    CBC/Anemia Profile:  Recent Labs   Lab 02/06/23  0835 02/06/23 1937 02/07/23  0246   WBC 34.89* 38.89* 32.21*   HGB 6.8* 7.9* 6.7*   HCT 21.2* 24.4* 21.2*   * 803* 730*   MCV 89 88 89   RDW 17.2* 17.5* 17.5*        Chemistries:  Recent Labs   Lab 02/06/23  0835 02/06/23 1937 02/07/23  0246     --  138   K 4.0  --  4.7     --  99   CO2 28  --  24   BUN 27*  --  23*   CREATININE 0.77  --  0.8   CALCIUM 9.1  --  8.8   ALBUMIN 3.3*  --  1.5*   PROT 7.3  --  5.6*   BILITOT 0.7  --  0.7   ALKPHOS 347*  --  257*   ALT 38  --  24   AST 31  --  15   MG  --  1.5* 1.5*   PHOS  --  4.4 6.4*       All pertinent labs within the past 24 hours have been reviewed.    Significant Imaging:  I have reviewed all pertinent imaging results/findings within the past 24 hours.      ABG  No results for input(s): PH, PO2, PCO2, HCO3, BE in the last 168 hours.  Assessment/Plan:     Neuro  Neuropathy  - home gabapentin, amitriptyline, duloxetine     Psychiatric  Anxiety and depression  - continue home bupropion, amitriptyline, clonazepam, and duloxetine    Pulmonary  * Hemoptysis  Patient with  cough for a week a coughed up a cup of blood in the last 24 hours   Not on blood thinners, non smoker, no lung disease history   CTA at outside hospital negative for PE but there was a solid mass in the superior aspect of the right lung concerning for malignancy.  Mass is partially necrotic with air bubbles peripherally.    At outside hospital Hg 6.8, was given 1 unit pRBC   Was also given nebulized TXA at outside hospital     - Nebulized TXA TID  - Monitor oxygen   - Intubation watch (if oxygen sats drop or unable to protect airway)  - Possible bronch with biopsy of mass   - Consult IR for possible emoblization if uncontrolled bleeding   - prn codeine-promethazine for cough  - Trend CBC   - monitor coags    Acute hypoxemic respiratory failure  Likely due to hemoptysis, lung mass, and possible pneumonia     - Vanc/levofloxacin   - CBC daily   - Possible bronch   - Supplemental O2 as needed   - Intubation watch     Mass of upper lobe of right lung  CTA at outside hospital negative for PE but there was a solid mass in the superior aspect of the right lung concerning for malignancy.  Mass is partially necrotic with air bubbles peripherally.    WBC 35 and lacate normal at outside hospital     - Vanc/levofloxacin   - FU cultures   - Possible bronch with biopsy     Cardiac/Vascular  Essential hypertension  - continue home losartan    Oncology  Anemia  Hg 6.8 at outside hospital, was given 1 unit pRBC   Here with hemoptysis   Repeat Hb noted at 6.7, receiving another unit of pRBC's    - CBC daily   - FU coags   - Transfuse if Hg <7, platelets <10, or platelets <50 and actively bleeding     Endocrine  Thyroid disease  - Continue home synthroid        Critical Care Daily Checklist:    A: Awake: RASS Goal/Actual Goal:    Actual: Guerrero Agitation Sedation Scale (RASS): Alert and calm   B: Spontaneous Breathing Trial Performed?     C: SAT & SBT Coordinated?  NA                      D: Delirium: CAM-ICU     E: Early Mobility  Performed? Yes   F: Feeding Goal:    Status:     Current Diet Order   Procedures    Diet NPO Except for: Medication, Sips with Medication, Ice Chips     Order Specific Question:   Except for     Answer:   Medication     Order Specific Question:   Except for     Answer:   Sips with Medication     Order Specific Question:   Except for     Answer:   Ice Chips      AS: Analgesia/Sedation NA   T: Thromboembolic Prophylaxis No   H: HOB > 300 Yes   U: Stress Ulcer Prophylaxis (if needed) No   G: Glucose Control No   B: Bowel Function     I: Indwelling Catheter (Lines & Horton) Necessity PICC   D: De-escalation of Antimicrobials/Pharmacotherapies Vanc/levofloxacin pending cultures    Plan for the day/ETD Monitor respiratory status vs step down    Code Status:  Family/Goals of Care: Full Code       Critical secondary to Patient has a condition that poses threat to life and bodily function: intubation watch in setting of hemoptysis      Critical care was time spent personally by me on the following activities: development of treatment plan with patient or surrogate and bedside caregivers, discussions with consultants, evaluation of patient's response to treatment, examination of patient, ordering and performing treatments and interventions, ordering and review of laboratory studies, ordering and review of radiographic studies, pulse oximetry, re-evaluation of patient's condition. This critical care time did not overlap with that of any other provider or involve time for any procedures.     Justyna Aragon MD  Critical Care Medicine  [facility] - Cardiac Medical ICU

## 2023-02-07 NOTE — ASSESSMENT & PLAN NOTE
Hg 6.8 at outside hospital, was given 1 unit pRBC   Here with hemoptysis     - CBC daily   - FU coags   - Transfuse if Hg <7, platelets <10, or platelets <50 and actively bleeding

## 2023-02-07 NOTE — PROGRESS NOTES
Pharmacokinetic Initial Assessment: IV Vancomycin    Assessment/Plan:    Ms. Ernandez received vancomycin with loading dose of 2000 mg once in the ED.  - Her renal function appears stable. Unsure of baseline given only one Scr level. No labs in CareEverywhere.  - Will schedule a maintenance dose of vancomycin 1500 mg IV every 12 hours.  - Desired empiric serum trough concentration is 15 to 20 mcg/mL.  - Draw vancomycin trough level 60 min prior to fourth dose on 2/8 at approximately 0200.  - Please draw random level sooner than scheduled trough if renal function changes significantly.    Pharmacy will continue to follow and monitor vancomycin.      Please contact pharmacy at extension [phone] with any questions regarding this assessment.     Thank you for the consult,   Nerissa Oliver       Patient brief summary:  Skyla Ernandez is a 52 y.o. female initiated on antimicrobial therapy with IV Vancomycin for treatment of suspected lower respiratory infection    Actual Body Weight:   99 kg    Renal Function:   Estimated Creatinine Clearance: 97.3 mL/min (based on SCr of 0.77 mg/dL).     Dialysis Method (if applicable):  N/A

## 2023-02-07 NOTE — PROCEDURES
"Skyla Ernandez is a 52 y.o. female patient.    Temp: 99.6 °F (37.6 °C) (02/07/23 0132)  Pulse: 104 (02/07/23 0132)  Resp: (!) 26 (02/07/23 0132)  BP: (!) 95/50 (02/07/23 0132)  SpO2: 98 % (02/07/23 0132)  Weight: 99 kg (218 lb 4.1 oz) (02/06/23 1836)  Height: 5' 5" (165.1 cm) (02/06/23 1836)    PICC  Date/Time: 2/7/2023 2:00 AM  Performed by: Prince Olivares RN  Consent Done: Yes  Time out: Immediately prior to procedure a time out was called to verify the correct patient, procedure, equipment, support staff and site/side marked as required  Indications: med administration  Anesthesia: local infiltration  Local anesthetic: lidocaine 1% without epinephrine  Anesthetic Total (mL): 1  Preparation: skin prepped with ChloraPrep  Skin prep agent dried: skin prep agent completely dried prior to procedure  Sterile barriers: all five maximum sterile barriers used - cap, mask, sterile gown, sterile gloves, and large sterile sheet  Hand hygiene: hand hygiene performed prior to central venous catheter insertion  Location details: right basilic  Catheter type: triple lumen  Catheter size: 5 Fr  Catheter Length: 36cm    Ultrasound guidance: yes  Vessel Caliber: medium and large and patent, compressibility normal  Needle advanced into vessel with real time Ultrasound guidance.  Guidewire confirmed in vessel.  Sterile sheath used.  Number of attempts: 1  Post-procedure: blood return through all ports, chlorhexidine patch and sterile dressing applied  Estimated blood loss (mL): 0          Name Prince Olivares   2/7/2023    "

## 2023-02-07 NOTE — ASSESSMENT & PLAN NOTE
Likely due to hemoptysis, lung mass, and possible pneumonia     - Vanc/levofloxacin   - CBC daily   - Possible bronch   - Supplemental O2 as needed   - Intubation watch

## 2023-02-07 NOTE — HPI
51 yo female with hypertension, hyperlipidemia, and thyroid disease who presented to [facility] Emergency Department this morning for hemoptysis. She reports 3 weeks of cough in which she first noticed blood tinged sputum 2 weeks ago. She reports that the cough has been productive of whitish brown sputum which became darker and eventually appeared dark red. The cough often interferes with sleep. She has also experienced shortness of breath over the past couple of days. She endorses sore throat, RUSSELL, and 2-3 pillow orthopnea. This morning she coughed up over a cup of bright red blood and decided to present to the ED. She denies any chronic tobacco history apart from occasional smoking cigarettes in her late teens. She denies piror homelessness, incarceration, close contacts with TB, or recent travel. She denies having a history of asthma, COPD, cirrhosis or nose bleeds but does report history of scoliosis.    Workup at the [facility] ED includes leukocytosis to 35K, hemoglobin of 6.8 now s/p 1 unit of pRBC's, thrombocytosis to 882, NT-proBNP 1640, troponin negative, and lactic acid 1.8. CXR diffuse patchy opacities scattered throughout the right middle and right lower lung zone which may be seen with pneumonia, aspiration, or edema. CTA of the chest showed no evidence of pulmonary embolism. Solid mass in the superior aspect of the right lung concerning for malignancy. Mass is partially necrotic with air bubbles peripherally. Mass abuts the pericardium and mediastinum. There is associated atelectasis/consolidation of the right middle lobe. Patchy opacities in the right lower lobe as well as the left lung which is nonspecific but may represent atelectasis, infectious process, or hemorrhage. She was started on levofloxacin. She was noted to be tachycardic to 100's with soft pressures 87//73 and stable oxygenation on 2LNC.    Patient transferred to [facility] medical intensive care unit for higher  level of care.

## 2023-02-07 NOTE — EICU
Prior to Bronch:     16:52  Versed 2 mg IVP and 50 mg Fentanyl IVP given as ordered per MD  17:00  Versed 2 mg IVP and 50 mg Fentanyl IVP given as ordered per MD  17:11  Fentanyl 50 IVP  given as ordered per MD  17:19 Versed 2 mg IVP given as ordered per MD

## 2023-02-07 NOTE — ASSESSMENT & PLAN NOTE
Patient with cough for a week a coughed up a cup of blood in the last 24 hours   Not on blood thinners, non smoker, no lung disease history   CTA at outside hospital negative for PE but there was a solid mass in the superior aspect of the right lung concerning for malignancy.  Mass is partially necrotic with air bubbles peripherally.    At outside hospital Hg 6.8, was given 1 unit pRBC   Was also given nebulized TXA at outside hospital     - Monitor oxygen   - Intubation watch (if oxygen sats drop or unable to protect airway)  - Possible bronch with biopsy of mass   - Consult IR for possible emoblization if bleeding does not stop   - Can consider TXA if bleeding does not stop   - Trend CBC   - FU coags

## 2023-02-07 NOTE — PLAN OF CARE
Patient seen with housestaff team on morning rounds and then plan of care was discussed in detail.       52-year-old woman with history of hypertension, hyperlipidemia and thyroid disease transferred from [facility] Emergency Department for evaluation hemoptysis.    Hemoptysis:  Reportedly ongoing 3+ weeks--> first started as blood tinged sputum.  Suspect this is multifactorial given findings on chest CT:  Mass, post obstructive pneumonia  Overnight, patient did not have any massive hemoptysis- just blood tinged sputum.  Plan for bronchoscopy today  Empiric broad-spectrum antibiotic coverage with vanc and levofloxacin.  Acute blood loss anemia:  Patient was transfused 1 unit of RBCs prior to transfer and then an additional unit this morning. Continue to monitor CBC and transfuse to maintain hemoglobin greater than 7.  HTN: continue home losartan.  Depression/Anxiety: continue home psych meds.  History of hypothyroidism: Continue home dose of Synthroid.      Critical Care Time: 34 minutes  Critical care was time spent personally by me on the following activities: evaluating this patient's organ dysfunction, development of treatment plan, discussing treatment plan with patient or surrogate and bedside caregivers, discussions with consultants, evaluation of patient's response to treatment, examination of patient, ordering and performing treatments and interventions, ordering and review of laboratory studies, ordering and review of radiographic studies, re-evaluation of patient's condition. This critical care time did not overlap with that of any other provider or involve time for any procedures.

## 2023-02-08 LAB
ALBUMIN SERPL BCP-MCNC: 1.5 G/DL (ref 3.5–5.2)
ALP SERPL-CCNC: 224 U/L (ref 55–135)
ALT SERPL W/O P-5'-P-CCNC: 22 U/L (ref 10–44)
ANION GAP SERPL CALC-SCNC: 12 MMOL/L (ref 8–16)
ANION GAP SERPL CALC-SCNC: 13 MMOL/L (ref 8–16)
ANISOCYTOSIS BLD QL SMEAR: SLIGHT
APTT BLDCRRT: 28.3 SEC (ref 21–32)
AST SERPL-CCNC: 19 U/L (ref 10–40)
BASOPHILS # BLD AUTO: 0.05 K/UL (ref 0–0.2)
BASOPHILS NFR BLD: 0.2 % (ref 0–1.9)
BILIRUB SERPL-MCNC: 0.5 MG/DL (ref 0.1–1)
BUN SERPL-MCNC: 18 MG/DL (ref 6–20)
BUN SERPL-MCNC: 19 MG/DL (ref 6–20)
BURR CELLS BLD QL SMEAR: ABNORMAL
CALCIUM SERPL-MCNC: 9.2 MG/DL (ref 8.7–10.5)
CALCIUM SERPL-MCNC: 9.7 MG/DL (ref 8.7–10.5)
CHLORIDE SERPL-SCNC: 97 MMOL/L (ref 95–110)
CHLORIDE SERPL-SCNC: 99 MMOL/L (ref 95–110)
CO2 SERPL-SCNC: 26 MMOL/L (ref 23–29)
CO2 SERPL-SCNC: 29 MMOL/L (ref 23–29)
CREAT SERPL-MCNC: 0.7 MG/DL (ref 0.5–1.4)
CREAT SERPL-MCNC: 0.7 MG/DL (ref 0.5–1.4)
DIFFERENTIAL METHOD: ABNORMAL
EOSINOPHIL # BLD AUTO: 0.2 K/UL (ref 0–0.5)
EOSINOPHIL NFR BLD: 0.6 % (ref 0–8)
ERYTHROCYTE [DISTWIDTH] IN BLOOD BY AUTOMATED COUNT: 16.7 % (ref 11.5–14.5)
EST. GFR  (NO RACE VARIABLE): >60 ML/MIN/1.73 M^2
EST. GFR  (NO RACE VARIABLE): >60 ML/MIN/1.73 M^2
GIANT PLATELETS BLD QL SMEAR: PRESENT
GLUCOSE SERPL-MCNC: 118 MG/DL (ref 70–110)
GLUCOSE SERPL-MCNC: 168 MG/DL (ref 70–110)
HCT VFR BLD AUTO: 22.6 % (ref 37–48.5)
HCT VFR BLD AUTO: 23.4 % (ref 37–48.5)
HGB BLD-MCNC: 7.1 G/DL (ref 12–16)
HGB BLD-MCNC: 7.2 G/DL (ref 12–16)
HYPOCHROMIA BLD QL SMEAR: ABNORMAL
IMM GRANULOCYTES # BLD AUTO: 0.33 K/UL (ref 0–0.04)
IMM GRANULOCYTES NFR BLD AUTO: 1.3 % (ref 0–0.5)
INR PPP: 1.2 (ref 0.8–1.2)
LACTATE SERPL-SCNC: 0.8 MMOL/L (ref 0.5–2.2)
LYMPHOCYTES # BLD AUTO: 1.7 K/UL (ref 1–4.8)
LYMPHOCYTES NFR BLD: 7 % (ref 18–48)
MAGNESIUM SERPL-MCNC: 1.9 MG/DL (ref 1.6–2.6)
MCH RBC QN AUTO: 27.9 PG (ref 27–31)
MCHC RBC AUTO-ENTMCNC: 30.8 G/DL (ref 32–36)
MCV RBC AUTO: 91 FL (ref 82–98)
MONOCYTES # BLD AUTO: 1.4 K/UL (ref 0.3–1)
MONOCYTES NFR BLD: 5.7 % (ref 4–15)
NEUTROPHILS # BLD AUTO: 21.1 K/UL (ref 1.8–7.7)
NEUTROPHILS NFR BLD: 85.2 % (ref 38–73)
NRBC BLD-RTO: 0 /100 WBC
OVALOCYTES BLD QL SMEAR: ABNORMAL
PHOSPHATE SERPL-MCNC: 4.5 MG/DL (ref 2.7–4.5)
PLATELET # BLD AUTO: 651 K/UL (ref 150–450)
PLATELET BLD QL SMEAR: ABNORMAL
PMV BLD AUTO: 8.7 FL (ref 9.2–12.9)
POIKILOCYTOSIS BLD QL SMEAR: SLIGHT
POLYCHROMASIA BLD QL SMEAR: ABNORMAL
POTASSIUM SERPL-SCNC: 4 MMOL/L (ref 3.5–5.1)
POTASSIUM SERPL-SCNC: 4.3 MMOL/L (ref 3.5–5.1)
PROT SERPL-MCNC: 6.5 G/DL (ref 6–8.4)
PROTHROMBIN TIME: 12.2 SEC (ref 9–12.5)
RBC # BLD AUTO: 2.58 M/UL (ref 4–5.4)
SODIUM SERPL-SCNC: 138 MMOL/L (ref 136–145)
SODIUM SERPL-SCNC: 138 MMOL/L (ref 136–145)
TSH SERPL DL<=0.005 MIU/L-ACNC: 0.57 UIU/ML (ref 0.4–4)
VANCOMYCIN TROUGH SERPL-MCNC: 18.7 UG/ML (ref 10–22)
WBC # BLD AUTO: 24.72 K/UL (ref 3.9–12.7)

## 2023-02-08 PROCEDURE — 80053 COMPREHEN METABOLIC PANEL: CPT

## 2023-02-08 PROCEDURE — 85014 HEMATOCRIT: CPT | Performed by: HOSPITALIST

## 2023-02-08 PROCEDURE — 99233 SBSQ HOSP IP/OBS HIGH 50: CPT | Mod: ,,, | Performed by: INTERNAL MEDICINE

## 2023-02-08 PROCEDURE — 25000003 PHARM REV CODE 250: Performed by: STUDENT IN AN ORGANIZED HEALTH CARE EDUCATION/TRAINING PROGRAM

## 2023-02-08 PROCEDURE — 25000003 PHARM REV CODE 250

## 2023-02-08 PROCEDURE — 25000003 PHARM REV CODE 250: Performed by: NURSE PRACTITIONER

## 2023-02-08 PROCEDURE — 94640 AIRWAY INHALATION TREATMENT: CPT

## 2023-02-08 PROCEDURE — 85610 PROTHROMBIN TIME: CPT

## 2023-02-08 PROCEDURE — A4216 STERILE WATER/SALINE, 10 ML: HCPCS | Performed by: NURSE PRACTITIONER

## 2023-02-08 PROCEDURE — 85018 HEMOGLOBIN: CPT | Performed by: HOSPITALIST

## 2023-02-08 PROCEDURE — 80048 BASIC METABOLIC PNL TOTAL CA: CPT | Mod: XB | Performed by: HOSPITALIST

## 2023-02-08 PROCEDURE — 25000003 PHARM REV CODE 250: Performed by: INTERNAL MEDICINE

## 2023-02-08 PROCEDURE — 84443 ASSAY THYROID STIM HORMONE: CPT | Performed by: STUDENT IN AN ORGANIZED HEALTH CARE EDUCATION/TRAINING PROGRAM

## 2023-02-08 PROCEDURE — 99900035 HC TECH TIME PER 15 MIN (STAT)

## 2023-02-08 PROCEDURE — 80202 ASSAY OF VANCOMYCIN: CPT

## 2023-02-08 PROCEDURE — 25000242 PHARM REV CODE 250 ALT 637 W/ HCPCS: Performed by: STUDENT IN AN ORGANIZED HEALTH CARE EDUCATION/TRAINING PROGRAM

## 2023-02-08 PROCEDURE — 94761 N-INVAS EAR/PLS OXIMETRY MLT: CPT

## 2023-02-08 PROCEDURE — 84100 ASSAY OF PHOSPHORUS: CPT

## 2023-02-08 PROCEDURE — 85025 COMPLETE CBC W/AUTO DIFF WBC: CPT

## 2023-02-08 PROCEDURE — 83735 ASSAY OF MAGNESIUM: CPT

## 2023-02-08 PROCEDURE — 27000221 HC OXYGEN, UP TO 24 HOURS

## 2023-02-08 PROCEDURE — 85730 THROMBOPLASTIN TIME PARTIAL: CPT

## 2023-02-08 PROCEDURE — 99233 PR SUBSEQUENT HOSPITAL CARE,LEVL III: ICD-10-PCS | Mod: ,,, | Performed by: INTERNAL MEDICINE

## 2023-02-08 PROCEDURE — 20600001 HC STEP DOWN PRIVATE ROOM

## 2023-02-08 PROCEDURE — 27100171 HC OXYGEN HIGH FLOW UP TO 24 HOURS

## 2023-02-08 PROCEDURE — 27201011 HC FORCEPS DISPOSABLE: Performed by: INTERNAL MEDICINE

## 2023-02-08 PROCEDURE — 83605 ASSAY OF LACTIC ACID: CPT | Performed by: HOSPITALIST

## 2023-02-08 PROCEDURE — 63600175 PHARM REV CODE 636 W HCPCS: Performed by: INTERNAL MEDICINE

## 2023-02-08 PROCEDURE — 63600175 PHARM REV CODE 636 W HCPCS: Mod: JG

## 2023-02-08 RX ORDER — MAGNESIUM SULFATE HEPTAHYDRATE 40 MG/ML
2 INJECTION, SOLUTION INTRAVENOUS ONCE
Status: COMPLETED | OUTPATIENT
Start: 2023-02-08 | End: 2023-02-08

## 2023-02-08 RX ADMIN — ATORVASTATIN CALCIUM 20 MG: 20 TABLET, FILM COATED ORAL at 08:02

## 2023-02-08 RX ADMIN — Medication 10 ML: at 07:02

## 2023-02-08 RX ADMIN — GABAPENTIN 400 MG: 400 CAPSULE ORAL at 02:02

## 2023-02-08 RX ADMIN — PROMETHAZINE HYDROCHLORIDE AND CODEINE PHOSPHATE 5 ML: 6.25; 1 SOLUTION ORAL at 08:02

## 2023-02-08 RX ADMIN — DULOXETINE 60 MG: 60 CAPSULE, DELAYED RELEASE ORAL at 08:02

## 2023-02-08 RX ADMIN — VANCOMYCIN HYDROCHLORIDE 1500 MG: 1.5 INJECTION, POWDER, LYOPHILIZED, FOR SOLUTION INTRAVENOUS at 02:02

## 2023-02-08 RX ADMIN — IPRATROPIUM BROMIDE AND ALBUTEROL SULFATE 3 ML: .5; 3 SOLUTION RESPIRATORY (INHALATION) at 09:02

## 2023-02-08 RX ADMIN — Medication 9 MG: at 08:02

## 2023-02-08 RX ADMIN — ACETAMINOPHEN 650 MG: 325 TABLET ORAL at 01:02

## 2023-02-08 RX ADMIN — PROMETHAZINE HYDROCHLORIDE AND CODEINE PHOSPHATE 5 ML: 6.25; 1 SOLUTION ORAL at 02:02

## 2023-02-08 RX ADMIN — CLONAZEPAM 0.5 MG: 0.5 TABLET ORAL at 08:02

## 2023-02-08 RX ADMIN — ALTEPLASE 2 MG: 2.2 INJECTION, POWDER, LYOPHILIZED, FOR SOLUTION INTRAVENOUS at 02:02

## 2023-02-08 RX ADMIN — GABAPENTIN 400 MG: 400 CAPSULE ORAL at 08:02

## 2023-02-08 RX ADMIN — AMITRIPTYLINE HYDROCHLORIDE 75 MG: 50 TABLET, FILM COATED ORAL at 08:02

## 2023-02-08 RX ADMIN — MAGNESIUM SULFATE 2 G: 2 INJECTION INTRAVENOUS at 08:02

## 2023-02-08 RX ADMIN — LIDOCAINE HYDROCHLORIDE 5 ML: 20 SOLUTION ORAL; TOPICAL at 06:02

## 2023-02-08 RX ADMIN — PROMETHAZINE HYDROCHLORIDE AND CODEINE PHOSPHATE 5 ML: 6.25; 1 SOLUTION ORAL at 07:02

## 2023-02-08 RX ADMIN — BUPROPION HYDROCHLORIDE 300 MG: 150 TABLET, FILM COATED, EXTENDED RELEASE ORAL at 08:02

## 2023-02-08 RX ADMIN — MUPIROCIN: 20 OINTMENT TOPICAL at 08:02

## 2023-02-08 RX ADMIN — LOSARTAN POTASSIUM 100 MG: 50 TABLET, FILM COATED ORAL at 08:02

## 2023-02-08 RX ADMIN — VANCOMYCIN HYDROCHLORIDE 1500 MG: 1.5 INJECTION, POWDER, LYOPHILIZED, FOR SOLUTION INTRAVENOUS at 03:02

## 2023-02-08 RX ADMIN — LEVOFLOXACIN 750 MG: 750 TABLET, FILM COATED ORAL at 08:02

## 2023-02-08 RX ADMIN — LEVOTHYROXINE SODIUM 50 MCG: 50 TABLET ORAL at 06:02

## 2023-02-08 NOTE — ASSESSMENT & PLAN NOTE
Likely due to hemoptysis, lung mass, and possible pneumonia     - Vanc/levofloxacin   - CBC daily   - Supplemental O2 as needed

## 2023-02-08 NOTE — PLAN OF CARE
Patient seen with housestaff team on morning rounds and then plan of care was discussed in detail.       52-year-old woman with history of hypertension, hyperlipidemia and thyroid disease transferred from [facility] Emergency Department for evaluation hemoptysis.    Hemoptysis:  No major episodes since transfer.  S/p bronchoscopy on 2/7/23.  No evidence of active bleeding in the airways.  Lavage performed.  Micro and cytology pending. Continue treatment with broad spectrum antibiotics for pneumonia. I would recommend full treatment with antibiotics and a follow up in pulmonary with repeat CT Scan in a few weeks time to determine which aspects of the abnormalities visualized on CT are pneumonia vs. Potential mass.   Acute blood loss anemia:  Patient was transfused 2 units of RBCs (1 prior to transfer, 1 in ICU).  Continue to monitor CBC and transfuse to maintain hemoglobin greater than 7.  HTN: continue home losartan.  Depression/Anxiety: continue home psych meds.  History of hypothyroidism: Continue home dose of Synthroid.    Stable for transition to the internal medicine service.

## 2023-02-08 NOTE — PROGRESS NOTES
Pharmacokinetic Assessment Follow Up: IV Vancomycin    Vancomycin serum concentration assessment(s):    The 24.5 hour trough level was drawn correctly and can be used to guide therapy at this time. The measurement is within the desired definitive target range of 15 to 20 mcg/mL.    Vancomycin Regimen Plan:    Continue regimen to Vancomycin 1500 mg IV every 12 hours with next serum trough concentration measured at 1400 prior to 4th dose on 2/9    Drug levels (last 3 results):  Recent Labs   Lab Result Units 02/08/23  0331   Vancomycin-Trough ug/mL 18.7       Pharmacy will continue to follow and monitor vancomycin.    Please contact pharmacy at extension [phone] for questions regarding this assessment.    Thank you for the consult,   Stacey Downey       Patient brief summary:  Skyla Ernandez is a 52 y.o. female initiated on antimicrobial therapy with IV Vancomycin for treatment of lower respiratory infection    Drug Allergies:   Review of patient's allergies indicates:   Allergen Reactions    Penicillins Hives       Actual Body Weight:   99 kg    Renal Function:   Estimated Creatinine Clearance: 107 mL/min (based on SCr of 0.7 mg/dL).    Dialysis Method (if applicable):  N/A    CBC (last 72 hours):  Recent Labs   Lab Result Units 02/06/23 0835 02/06/23 1937 02/07/23 0246 02/08/23  0331   WBC K/uL 34.89* 38.89* 32.21* 24.72*   Hemoglobin g/dL 6.8* 7.9* 6.7* 7.2*   Hematocrit % 21.2* 24.4* 21.2* 23.4*   Platelets K/uL 882* 803* 730* 651*   Gran % % 92.0* 91.5* 80.0* 85.2*   Lymph % % 4.0* 3.2* 8.0* 7.0*   Mono % % 1.0* 3.3* 4.0 5.7   Eosinophil % % 1.0 0.2 0.0 0.6   Basophil % % 0.0 0.2 0.0 0.2   Differential Method  Automated Automated Automated Automated       Metabolic Panel (last 72 hours):  Recent Labs   Lab Result Units 02/06/23 0835 02/06/23 1937 02/07/23 0246 02/08/23  0331   Sodium mmol/L 140  --  138 138   Potassium mmol/L 4.0  --  4.7 4.3   Chloride mmol/L 100  --  99 99   CO2 mmol/L 28  --  24 26    Glucose mg/dL 186*  --  160* 118*   BUN mg/dL 27*  --  23* 19   Creatinine mg/dL 0.77  --  0.8 0.7   Albumin g/dL 3.3*  --  1.5* 1.5*   Total Bilirubin mg/dL 0.7  --  0.7 0.5   Alkaline Phosphatase U/L 347*  --  257* 224*   AST U/L 31  --  15 19   ALT U/L 38  --  24 22   Magnesium mg/dL  --  1.5* 1.5* 1.9   Phosphorus mg/dL  --  4.4 6.4* 4.5       Vancomycin Administrations:  vancomycin given in the last 96 hours                     vancomycin 1,500 mg in dextrose 5 % (D5W) 250 mL IVPB (Vial-Mate) (mg) 1,500 mg New Bag 02/07/23 1508     1,500 mg New Bag  0313    vancomycin 2 g in 0.9% sodium chloride 500 mL IVPB (mg) 2,000 mg New Bag 02/06/23 1513                    Microbiologic Results:  Microbiology Results (last 7 days)       Procedure Component Value Units Date/Time    Blood culture [[id number]] Collected: 02/06/23 2050    Order Status: Completed Specimen: Blood from Peripheral, Forearm, Left Updated: 02/07/23 2322     Blood Culture, Routine No Growth to date      No Growth to date    Blood culture [[id number]] Collected: 02/06/23 2050    Order Status: Completed Specimen: Blood from Peripheral, Forearm, Left Updated: 02/07/23 2322     Blood Culture, Routine No Growth to date      No Growth to date    AFB stain [[id number]] Collected: 02/07/23 1800    Order Status: Completed Specimen: Body Fluid from Lung, RUL Updated: 02/07/23 2242     Direct Acid Fast No acid fast bacilli seen.    Narrative:      Bronchial Brush    Culture, Respiratory with Gram Stain [[id number]] Collected: 02/07/23 1759    Order Status: Completed Specimen: Respiratory from Bronchial [place] Updated: 02/07/23 2140     Gram Stain (Respiratory) <10 epithelial cells per low power field.     Gram Stain (Respiratory) Rare WBC's     Gram Stain (Respiratory) Rare Gram positive cocci    KOH prep [[id number]] Collected: 02/07/23 1800    Order Status: Completed Specimen: Body Fluid from Lung, RUL Updated: 02/07/23 2106     KOH Prep No yeast or fungal  elements seen    Narrative:      Bronchial Brush    Culture, Respiratory [[id number]] Collected: 02/07/23 1759    Order Status: Completed Specimen: Respiratory from Bronchial [place] Updated: 02/07/23 2057    Narrative:      Bronchial Brush    Fungus culture [[id number]] Collected: 02/07/23 1800    Order Status: Sent Specimen: Body Fluid from Lung, RUL Updated: 02/07/23 2027    AFB Culture & Smear [[id number]] Collected: 02/07/23 1800    Order Status: Sent Specimen: Body Fluid from Lung, RUL Updated: 02/07/23 2026    Gram stain [[id number]] Collected: 02/07/23 1800    Order Status: Canceled Specimen: Body Fluid from Lung, RUL

## 2023-02-08 NOTE — RESIDENT HANDOFF
Handoff     Primary Team: Networked reference to record PCT  Room Number: 6079/6079 A     Patient Name: Skyla Ernandez MRN: [id number]     Date of Birth: [id number] Allergies: Penicillins     Age: 52 y.o. Admit Date: 2/6/2023     Sex: female  BMI: Body mass index is 36.32 kg/m².     Code Status: Full Code        Illness Level (current clinical status): Watcher - Yes - watch for repeat episode of hemoptysis and CBC's    Reason for Admission: Hemoptysis    Brief HPI (pertinent PMH and diagnosis or differential diagnosis): 53 yo female with hypertension, hyperlipidemia, and thyroid disease who presented to [facility] Emergency Department this morning for hemoptysis. She reports 3 weeks of cough in which she first noticed blood tinged sputum 2 weeks ago. She reports that the cough has been productive of whitish brown sputum which became darker and eventually appeared dark red. The cough often interferes with sleep. She has also experienced shortness of breath over the past couple of days. She endorses sore throat, RUSSELL, and 2-3 pillow orthopnea. This morning she coughed up over a cup of bright red blood and decided to present to the ED. She denies any chronic tobacco history apart from occasional smoking cigarettes in her late teens. She denies piror homelessness, incarceration, close contacts with TB, or recent travel. She denies having a history of asthma, COPD, cirrhosis or nose bleeds but does report history of scoliosis.     Workup at the [facility] ED includes leukocytosis to 35K, hemoglobin of 6.8 now s/p 1 unit of pRBC's, thrombocytosis to 882, NT-proBNP 1640, troponin negative, and lactic acid 1.8. CXR diffuse patchy opacities scattered throughout the right middle and right lower lung zone which may be seen with pneumonia, aspiration, or edema. CTA of the chest showed no evidence of pulmonary embolism. Solid mass in the superior aspect of the right lung concerning for malignancy. Mass is partially  necrotic with air bubbles peripherally. Mass abuts the pericardium and mediastinum. There is associated atelectasis/consolidation of the right middle lobe. Patchy opacities in the right lower lobe as well as the left lung which is nonspecific but may represent atelectasis, infectious process, or hemorrhage. She was started on levofloxacin. She was noted to be tachycardic to 100's with soft pressures 87//73 and stable oxygenation on 2LNC.     Patient transferred to [facility] medical intensive care unit for higher level of care.    Procedure Date: s/p bronchoscopy on 2/7    Hospital Course (updated, brief assessment by system or problem, significant events): Patient admitted to MICU for further evaluation and management of hemoptysis. Hb of 6.7, received a second unit of pRBC's as patient continued to have cough with blood tinged sputum. On levofloxacin and vancomycin given leukocytosis, fever, and concerned for pneumonia. Patient remains HDS and satting well on 4L NC. Hypoxic respiratory failure likely 2/2 pneumonia vs hemoptysis vs lung mass. S/p bronchoscopy on 2/7 with BAL performed which was significant for grossly normal airways apart from sticky thick white mucus. Results from BAL unremarkable so far and cultures NGTD. Patient continues to be HDS and satting well on LFNC. Hb stable without any further hemoptysis for over 24 hours. Patient is medically ready for step down to hospital medicine.    Tasks (specific, using if-then statements):   - f/u blood cultures  - f/u respiratory cultures  - f/u BAL results   - monitor daily CBC  - treat pneumonia with IV abx and repeat imaging in 1-3 weeks once PNA treatment complete to evaluate for resolution of the mass and if mass still present can consider further efforts to biopsy  - Dr. Vallejo can follow patient in clinic outpatient     Contingency Plan (special circumstances anticipated and plan): contact MICU    Estimated Discharge Date: TBD    Discharge  Disposition: Home or Self Care

## 2023-02-08 NOTE — PROCEDURES
Bronchoscopy Procedure Note      Date of Operation: 02/07/2023    Pre-op Diagnosis:     Operators:  Dima Ronquillo DO and Valerie Vallejo MD    Anesthesia:   Versed 6 mg (in divided doses)  Fentanyl 200 mcg (in divided doses)  2% lidocaine jelly  1% lidocaine (infiltrated onto cords and into airways during procedure)    Operation: Flexible fiberoptic bronchoscopy, BAL    Specimens: 30ml    Estimated Blood Loss: Minimal    Indications:   The patient is a 52    Consent:   The risks, benefits, complications, treatment options and expected outcomes were discussed with the patient.  The possibilities of reaction to medication, pulmonary aspiration, pneumothorax, bleeding, failure to diagnose her condition, and creating a complication requiring transfusion or operation were discussed with patient who freely signed the consent.      Description of Procedure:  The patient was seen in ICU. The patient was in the icu, identified as Skyla Ernandez and the procedure verified as Flexible Fiberoptic Bronchoscopy BAL.  A Time Out was conducted and the above information confirmed.     After the induction of topical nasopharyngeal anesthesia, the patient was positioned supine, and the bronchoscope was passed through the left nare without difficulty. The vocal cords were visualized and 2% buffered lidocaine 5 ml was topically placed onto the cords. The cords were unremarkable in appearance with normal motion. The scope was then passed into the trachea.  1% buffered lidocaine 3 ml was used topically on the abram.  Careful inspection of the tracheal lumen was accomplished. The scope was sequentially passed into the left = main stem bronchus and then left upper and lower bronchi and segmental bronchi. The scope was then withdrawn and advanced into the right main stem bronchus and then into the right bronchi and segmental bronchi.    BAL was preformed in the anterior of the RUL.  60 cc was instilled with 30 cc of frothy non  purulent, non bloody fluid returned    The scope was then withdrawn, and the patient was taken to the recovery area in satisfactory condition.    Findings: Normal appearing mucosa, some mucoid secretions in NIESHA, some cobblestoning of trachea. No masses appreciable, no visible bleeding.     Dima Ronquillo DO  [facility]  8:06 PM 02/07/2023

## 2023-02-08 NOTE — PROGRESS NOTES
[facility] Card[facility] Medical [facility]  Critical Care Medicine  Progress Note    Patient Name: Skyla Ernandez  MRN: [id number]  Admission Date: 2/6/2023  Hospital Length of Stay: 2 days  Code Status: Full Code  Attending Provider: Keely Vallejo MD  Primary Care Provider: Primary Doctor No   Principal Problem: Hemoptysis    Subjective:     HPI:  53 yo female with hypertension, hyperlipidemia, and thyroid disease who presented to [facility] Emergency Department this morning for hemoptysis. She reports 3 weeks of cough in which she first noticed blood tinged sputum 2 weeks ago. She reports that the cough has been productive of whitish brown sputum which became darker and eventually appeared dark red. The cough often interferes with sleep. She has also experienced shortness of breath over the past couple of days. She endorses sore throat, RUSSELL, and 2-3 pillow orthopnea. This morning she coughed up over a cup of bright red blood and decided to present to the ED. She denies any chronic tobacco history apart from occasional smoking cigarettes in her late teens. She denies piror homelessness, incarceration, close contacts with TB, or recent travel. She denies having a history of asthma, COPD, cirrhosis or nose bleeds but does report history of scoliosis.    Workup at the [facility] ED includes leukocytosis to 35K, hemoglobin of 6.8 now s/p 1 unit of pRBC's, thrombocytosis to 882, NT-proBNP 1640, troponin negative, and lactic acid 1.8. CXR diffuse patchy opacities scattered throughout the right middle and right lower lung zone which may be seen with pneumonia, aspiration, or edema. CTA of the chest showed no evidence of pulmonary embolism. Solid mass in the superior aspect of the right lung concerning for malignancy. Mass is partially necrotic with air bubbles peripherally. Mass abuts the pericardium and mediastinum. There is associated atelectasis/consolidation of the right middle lobe. Patchy opacities in the  right lower lobe as well as the left lung which is nonspecific but may represent atelectasis, infectious process, or hemorrhage. She was started on levofloxacin. She was noted to be tachycardic to 100's with soft pressures 87//73 and stable oxygenation on 2LNC.    Patient [facility] for higher level of care.      Hospital/ICU Course:  Patient admitted to MICU for further evaluation and management of hemoptysis. Hb of 6.7, received a second unit of pRBC's as patient continued to have cough with blood tinged sputum. On levofloxacin and vancomycin given leukocytosis, fever, and concerned for pneumonia. Patient remains HDS and satting well on 4L NC. Hypoxic respiratory failure likely 2/2 pneumonia vs hemoptysis vs lung mass. S/p bronchoscopy on 2/7 with BAL performed which was significant for grossly normal airways apart from sticky thick white mucus. Results from BAL unremarkable so far and cultures NGTD. Patient continues to be HDS and satting well on LFNC. Hb stable without any further hemoptysis for over 24 hours. Patient is medically ready for step down to hospital medicine.       Interval History/Significant Events: NAEON. Patient s/p bronchoscopy yesterday without further hemoptysis. She reports chest soreness when she coughs but denies SOB or chest pain otherwise. Hb stable and she has remained HDS. She is satting well on 3L NC. Patient medically ready for step down to hospital medicine.     Review of Systems   Constitutional:  Negative for chills, fever and unexpected weight change.   HENT:  Positive for rhinorrhea and sore throat. Negative for congestion.    Eyes:  Negative for photophobia, pain and visual disturbance.   Respiratory:  Positive for cough (hemoptysis resolved) and shortness of breath (RUSSELL). Negative for chest tightness and wheezing.    Cardiovascular:  Positive for chest pain (chest soreness during cough). Negative for leg swelling.        Orthopnea    Gastrointestinal:  Negative for abdominal pain, blood in stool, diarrhea, nausea and vomiting.   Genitourinary:  Negative for dysuria, frequency and hematuria.        Stress incontinence, particularly with coughing   Neurological:  Negative for dizziness, syncope, light-headedness and headaches.   Psychiatric/Behavioral:  Negative for confusion and decreased concentration.    Objective:     Vital Signs (Most Recent):  Temp: 98.2 °F (36.8 °C) (02/08/23 0701)  Pulse: 98 (02/08/23 0900)  Resp: (!) 37 (02/08/23 0900)  BP: (!) 117/56 (02/08/23 0900)  SpO2: 95 % (02/08/23 0900)   Vital Signs (24h Range):  Temp:  [98.2 °F (36.8 °C)-99.8 °F (37.7 °C)] 98.2 °F (36.8 °C)  Pulse:  [] 98  Resp:  [12-37] 37  SpO2:  [89 %-100 %] 95 %  BP: ()/(53-78) 117/56   Weight: 99 kg (218 lb 4.1 oz)  Body mass index is 36.32 kg/m².      Intake/Output Summary (Last 24 hours) at 2/8/2023 0945  Last data filed at 2/8/2023 0900  Gross per 24 hour   Intake 520.55 ml   Output 1400 ml   Net -879.45 ml         Physical Exam  Vitals and nursing note reviewed.   Constitutional:       General: She is not in acute distress.     Appearance: She is obese. She is not ill-appearing or diaphoretic.   HENT:      Head: Normocephalic and atraumatic.      Right Ear: External ear normal.      Left Ear: External ear normal.      Nose: Nose normal.      Comments: Nasal cannula in place, no obvious bleeding in nares or mouth     Mouth/Throat:      Mouth: Mucous membranes are dry.      Pharynx: Oropharynx is clear. No oropharyngeal exudate or posterior oropharyngeal erythema.      Comments: No obvious bleeding in mouth or throat  Eyes:      General: No scleral icterus.        Right eye: No discharge.         Left eye: No discharge.      Extraocular Movements: Extraocular movements intact.      Conjunctiva/sclera: Conjunctivae normal.   Cardiovascular:      Rate and Rhythm: Normal rate and regular rhythm.      Heart sounds: Normal heart sounds. No murmur  heard.  Pulmonary:      Effort: Pulmonary effort is normal. No respiratory distress.      Breath sounds: Wheezing (R > L) and rhonchi present. No rales.   Abdominal:      General: Abdomen is flat. There is no distension.      Palpations: Abdomen is soft. There is no mass.      Tenderness: There is no abdominal tenderness. There is no guarding or rebound.   Musculoskeletal:         General: No swelling or deformity. Normal range of motion.      Cervical back: Normal range of motion and neck supple.      Right lower leg: No edema.      Left lower leg: No edema.   Skin:     General: Skin is warm.      Coloration: Skin is pale. Skin is not jaundiced.      Findings: No bruising.   Neurological:      Mental Status: She is alert and oriented to person, place, and time.   Psychiatric:         Mood and Affect: Mood normal.         Behavior: Behavior normal.         Thought Content: Thought content normal.       Vents:     Lines/Drains/Airways       Peripherally Inserted Central Catheter Line  Duration             PICC Triple Lumen 02/07/23 0200 right basilic 1 day              Drain  Duration             Female External Urinary Catheter 02/08/23 0829 <1 day                  Significant Labs:    CBC/Anemia Profile:  Recent Labs   Lab 02/06/23 1937 02/07/23 0246 02/08/23  0331   WBC 38.89* 32.21* 24.72*   HGB 7.9* 6.7* 7.2*   HCT 24.4* 21.2* 23.4*   * 730* 651*   MCV 88 89 91   RDW 17.5* 17.5* 16.7*          Chemistries:  Recent Labs   Lab 02/06/23 1937 02/07/23 0246 02/08/23  0331   NA  --  138 138   K  --  4.7 4.3   CL  --  99 99   CO2  --  24 26   BUN  --  23* 19   CREATININE  --  0.8 0.7   CALCIUM  --  8.8 9.7   ALBUMIN  --  1.5* 1.5*   PROT  --  5.6* 6.5   BILITOT  --  0.7 0.5   ALKPHOS  --  257* 224*   ALT  --  24 22   AST  --  15 19   MG 1.5* 1.5* 1.9   PHOS 4.4 6.4* 4.5         All pertinent labs within the past 24 hours have been reviewed.    Significant Imaging:  I have reviewed all pertinent imaging  results/findings within the past 24 hours.      ABG  No results for input(s): PH, PO2, PCO2, HCO3, BE in the last 168 hours.  Assessment/Plan:     Neuro  Neuropathy  - home gabapentin, amitriptyline, duloxetine     Psychiatric  Anxiety and depression  - continue home bupropion, amitriptyline, clonazepam, and duloxetine    Pulmonary  * Hemoptysis  Patient with cough for 3 weeks and coughed up a cup of blood prior to presentation  Not on blood thinners, non-tobacco smoker, heavy marijuana smoker, no lung disease history   CTA at outside hospital negative for PE but there was a solid mass in the superior aspect of the right lung concerning for malignancy.  Mass is partially necrotic with air bubbles peripherally.    At outside hospital Hg 6.8, was given 1 unit pRBC   Was also given nebulized TXA at outside hospital   2/8: Hemoglobin remaining stable with resolution of hemoptysis    - Discontinue nebulized TXA  - Monitor oxygen   - s/p bronch on 2/7, see procedure note  - Consult IR for possible emoblization if uncontrolled bleeding   - prn codeine-promethazine for cough  - Trend CBC   - monitor coags    Acute hypoxemic respiratory failure  Likely due to hemoptysis, lung mass, and possible pneumonia     - Vanc/levofloxacin   - CBC daily   - Supplemental O2 as needed     Mass of upper lobe of right lung  CTA at outside hospital negative for PE but there was a solid mass in the superior aspect of the right lung concerning for malignancy.  Mass is partially necrotic with air bubbles peripherally.    WBC 35 and lacate normal at outside hospital     - Vanc/levofloxacin   - FU cultures   - s/p bronch on 2/7, no mass visualized in airways to biopsy but BAL performed with results pending  - consider inpatient IR consult to biopsy mass    Cardiac/Vascular  Essential hypertension  - continue home losartan    Oncology  Anemia  Hg 6.8 at outside hospital, was given 1 unit pRBC   Here with hemoptysis   Repeat Hb noted at 6.7,  received a second unit of pRBC's  2/8: hemoptysis resolved and hemoglobin stable    - CBC daily   - FU coags   - Transfuse if Hg <7, platelets <10, or platelets <50 and actively bleeding     Endocrine  Thyroid disease  - Continue home synthroid        Critical Care Daily Checklist:    A: Awake: RASS Goal/Actual Goal:    Actual: Guerrero Agitation Sedation Scale (RASS): Alert and calm   B: Spontaneous Breathing Trial Performed?     C: SAT & SBT Coordinated?  NA                      D: Delirium: CAM-ICU     E: Early Mobility Performed? Yes   F: Feeding Goal:    Status:     Current Diet Order   Procedures    Diet Adult Regular (IDDSI Level 7)      AS: Analgesia/Sedation NA   T: Thromboembolic Prophylaxis No   H: HOB > 300 Yes   U: Stress Ulcer Prophylaxis (if needed) No   G: Glucose Control No   B: Bowel Function     I: Indwelling Catheter (Lines & Horton) Necessity 1 PICC   D: De-escalation of Antimicrobials/Pharmacotherapies vanc and levofloxacin    Plan for the day/ETD Step down    Code Status:  Family/Goals of Care: Full Code       Critical secondary to Patient has a condition that poses threat to life and bodily function: hemoptysis      Critical care was time spent personally by me on the following activities: development of treatment plan with patient or surrogate and bedside caregivers, discussions with consultants, evaluation of patient's response to treatment, examination of patient, ordering and performing treatments and interventions, ordering and review of laboratory studies, ordering and review of radiographic studies, pulse oximetry, re-evaluation of patient's condition. This critical care time did not overlap with that of any other provider or involve time for any procedures.     Justyna Aragon MD  Critical Care Medicine  [facility] - Cardiac Medical ICU

## 2023-02-08 NOTE — ASSESSMENT & PLAN NOTE
Patient with cough for 3 weeks and coughed up a cup of blood prior to presentation  Not on blood thinners, non-tobacco smoker, heavy marijuana smoker, no lung disease history   CTA at outside hospital negative for PE but there was a solid mass in the superior aspect of the right lung concerning for malignancy.  Mass is partially necrotic with air bubbles peripherally.    At outside hospital Hg 6.8, was given 1 unit pRBC   Was also given nebulized TXA at outside hospital   2/8: Hemoglobin remaining stable with resolution of hemoptysis    - Discontinue nebulized TXA  - Monitor oxygen   - s/p bronch on 2/7, see procedure note  - Consult IR for possible emoblization if uncontrolled bleeding   - prn codeine-promethazine for cough  - Trend CBC   - monitor coags

## 2023-02-08 NOTE — ASSESSMENT & PLAN NOTE
CTA at outside hospital negative for PE but there was a solid mass in the superior aspect of the right lung concerning for malignancy.  Mass is partially necrotic with air bubbles peripherally.    WBC 35 and lacate normal at outside hospital     - Vanc/levofloxacin   - FU cultures   - s/p bronch on 2/7, no mass visualized in airways to biopsy but BAL performed with results pending  - consider inpatient IR consult to biopsy mass

## 2023-02-08 NOTE — ASSESSMENT & PLAN NOTE
Hg 6.8 at outside hospital, was given 1 unit pRBC   Here with hemoptysis   Repeat Hb noted at 6.7, received a second unit of pRBC's  2/8: hemoptysis resolved and hemoglobin stable    - CBC daily   - FU coags   - Transfuse if Hg <7, platelets <10, or platelets <50 and actively bleeding

## 2023-02-08 NOTE — SUBJECTIVE & OBJECTIVE
Interval History/Significant Events: NAEON. Patient s/p bronchoscopy yesterday without further hemoptysis. She reports chest soreness when she coughs but denies SOB or chest pain otherwise. Hb stable and she has remained HDS. She is satting well on 3L NC. Patient medically ready for step down to hospital medicine.     Review of Systems   Constitutional:  Negative for chills, fever and unexpected weight change.   HENT:  Positive for rhinorrhea and sore throat. Negative for congestion.    Eyes:  Negative for photophobia, pain and visual disturbance.   Respiratory:  Positive for cough (hemoptysis resolved) and shortness of breath (RUSSELL). Negative for chest tightness and wheezing.    Cardiovascular:  Positive for chest pain (chest soreness during cough). Negative for leg swelling.        Orthopnea   Gastrointestinal:  Negative for abdominal pain, blood in stool, diarrhea, nausea and vomiting.   Genitourinary:  Negative for dysuria, frequency and hematuria.        Stress incontinence, particularly with coughing   Neurological:  Negative for dizziness, syncope, light-headedness and headaches.   Psychiatric/Behavioral:  Negative for confusion and decreased concentration.    Objective:     Vital Signs (Most Recent):  Temp: 98.2 °F (36.8 °C) (02/08/23 0701)  Pulse: 98 (02/08/23 0900)  Resp: (!) 37 (02/08/23 0900)  BP: (!) 117/56 (02/08/23 0900)  SpO2: 95 % (02/08/23 0900)   Vital Signs (24h Range):  Temp:  [98.2 °F (36.8 °C)-99.8 °F (37.7 °C)] 98.2 °F (36.8 °C)  Pulse:  [] 98  Resp:  [12-37] 37  SpO2:  [89 %-100 %] 95 %  BP: ()/(53-78) 117/56   Weight: 99 kg (218 lb 4.1 oz)  Body mass index is 36.32 kg/m².      Intake/Output Summary (Last 24 hours) at 2/8/2023 0945  Last data filed at 2/8/2023 0900  Gross per 24 hour   Intake 520.55 ml   Output 1400 ml   Net -879.45 ml         Physical Exam  Vitals and nursing note reviewed.   Constitutional:       General: She is not in acute distress.     Appearance: She is  obese. She is not ill-appearing or diaphoretic.   HENT:      Head: Normocephalic and atraumatic.      Right Ear: External ear normal.      Left Ear: External ear normal.      Nose: Nose normal.      Comments: Nasal cannula in place, no obvious bleeding in nares or mouth     Mouth/Throat:      Mouth: Mucous membranes are dry.      Pharynx: Oropharynx is clear. No oropharyngeal exudate or posterior oropharyngeal erythema.      Comments: No obvious bleeding in mouth or throat  Eyes:      General: No scleral icterus.        Right eye: No discharge.         Left eye: No discharge.      Extraocular Movements: Extraocular movements intact.      Conjunctiva/sclera: Conjunctivae normal.   Cardiovascular:      Rate and Rhythm: Normal rate and regular rhythm.      Heart sounds: Normal heart sounds. No murmur heard.  Pulmonary:      Effort: Pulmonary effort is normal. No respiratory distress.      Breath sounds: Wheezing (R > L) and rhonchi present. No rales.   Abdominal:      General: Abdomen is flat. There is no distension.      Palpations: Abdomen is soft. There is no mass.      Tenderness: There is no abdominal tenderness. There is no guarding or rebound.   Musculoskeletal:         General: No swelling or deformity. Normal range of motion.      Cervical back: Normal range of motion and neck supple.      Right lower leg: No edema.      Left lower leg: No edema.   Skin:     General: Skin is warm.      Coloration: Skin is pale. Skin is not jaundiced.      Findings: No bruising.   Neurological:      Mental Status: She is alert and oriented to person, place, and time.   Psychiatric:         Mood and Affect: Mood normal.         Behavior: Behavior normal.         Thought Content: Thought content normal.       Vents:     Lines/Drains/Airways       Peripherally Inserted Central Catheter Line  Duration             PICC Triple Lumen 02/07/23 0200 right basilic 1 day              Drain  Duration             Female External Urinary  Catheter 02/08/23 0829 <1 day                  Significant Labs:    CBC/Anemia Profile:  Recent Labs   Lab 02/06/23 1937 02/07/23 0246 02/08/23  0331   WBC 38.89* 32.21* 24.72*   HGB 7.9* 6.7* 7.2*   HCT 24.4* 21.2* 23.4*   * 730* 651*   MCV 88 89 91   RDW 17.5* 17.5* 16.7*          Chemistries:  Recent Labs   Lab 02/06/23 1937 02/07/23 0246 02/08/23  0331   NA  --  138 138   K  --  4.7 4.3   CL  --  99 99   CO2  --  24 26   BUN  --  23* 19   CREATININE  --  0.8 0.7   CALCIUM  --  8.8 9.7   ALBUMIN  --  1.5* 1.5*   PROT  --  5.6* 6.5   BILITOT  --  0.7 0.5   ALKPHOS  --  257* 224*   ALT  --  24 22   AST  --  15 19   MG 1.5* 1.5* 1.9   PHOS 4.4 6.4* 4.5         All pertinent labs within the past 24 hours have been reviewed.    Significant Imaging:  I have reviewed all pertinent imaging results/findings within the past 24 hours.

## 2023-02-09 PROBLEM — I48.92 ATRIAL FLUTTER: Status: ACTIVE | Noted: 2023-02-09

## 2023-02-09 LAB
ALBUMIN SERPL BCP-MCNC: 1.4 G/DL (ref 3.5–5.2)
ALP SERPL-CCNC: 229 U/L (ref 55–135)
ALT SERPL W/O P-5'-P-CCNC: 39 U/L (ref 10–44)
ANION GAP SERPL CALC-SCNC: 11 MMOL/L (ref 8–16)
ANISOCYTOSIS BLD QL SMEAR: ABNORMAL
APTT BLDCRRT: 27.3 SEC (ref 21–32)
AST SERPL-CCNC: 49 U/L (ref 10–40)
BASOPHILS # BLD AUTO: 0.04 K/UL (ref 0–0.2)
BASOPHILS # BLD AUTO: 0.04 K/UL (ref 0–0.2)
BASOPHILS NFR BLD: 0.2 % (ref 0–1.9)
BASOPHILS NFR BLD: 0.2 % (ref 0–1.9)
BILIRUB SERPL-MCNC: 0.4 MG/DL (ref 0.1–1)
BUN SERPL-MCNC: 15 MG/DL (ref 6–20)
CALCIUM SERPL-MCNC: 9.2 MG/DL (ref 8.7–10.5)
CHLORIDE SERPL-SCNC: 97 MMOL/L (ref 95–110)
CO2 SERPL-SCNC: 28 MMOL/L (ref 23–29)
CREAT SERPL-MCNC: 0.6 MG/DL (ref 0.5–1.4)
DIFFERENTIAL METHOD: ABNORMAL
DIFFERENTIAL METHOD: ABNORMAL
EOSINOPHIL # BLD AUTO: 0.2 K/UL (ref 0–0.5)
EOSINOPHIL # BLD AUTO: 0.3 K/UL (ref 0–0.5)
EOSINOPHIL NFR BLD: 1 % (ref 0–8)
EOSINOPHIL NFR BLD: 1.3 % (ref 0–8)
ERYTHROCYTE [DISTWIDTH] IN BLOOD BY AUTOMATED COUNT: 16.7 % (ref 11.5–14.5)
ERYTHROCYTE [DISTWIDTH] IN BLOOD BY AUTOMATED COUNT: 16.8 % (ref 11.5–14.5)
EST. GFR  (NO RACE VARIABLE): >60 ML/MIN/1.73 M^2
GLUCOSE SERPL-MCNC: 128 MG/DL (ref 70–110)
HCT VFR BLD AUTO: 22.4 % (ref 37–48.5)
HCT VFR BLD AUTO: 24.4 % (ref 37–48.5)
HGB BLD-MCNC: 6.8 G/DL (ref 12–16)
HGB BLD-MCNC: 7.6 G/DL (ref 12–16)
HYPOCHROMIA BLD QL SMEAR: ABNORMAL
IMM GRANULOCYTES # BLD AUTO: 0.23 K/UL (ref 0–0.04)
IMM GRANULOCYTES # BLD AUTO: 0.33 K/UL (ref 0–0.04)
IMM GRANULOCYTES NFR BLD AUTO: 1 % (ref 0–0.5)
IMM GRANULOCYTES NFR BLD AUTO: 1.6 % (ref 0–0.5)
INR PPP: 1.2 (ref 0.8–1.2)
LYMPHOCYTES # BLD AUTO: 1.4 K/UL (ref 1–4.8)
LYMPHOCYTES # BLD AUTO: 1.7 K/UL (ref 1–4.8)
LYMPHOCYTES NFR BLD: 5.7 % (ref 18–48)
LYMPHOCYTES NFR BLD: 8 % (ref 18–48)
MAGNESIUM SERPL-MCNC: 1.7 MG/DL (ref 1.6–2.6)
MCH RBC QN AUTO: 28.2 PG (ref 27–31)
MCH RBC QN AUTO: 28.5 PG (ref 27–31)
MCHC RBC AUTO-ENTMCNC: 30.4 G/DL (ref 32–36)
MCHC RBC AUTO-ENTMCNC: 31.1 G/DL (ref 32–36)
MCV RBC AUTO: 91 FL (ref 82–98)
MCV RBC AUTO: 93 FL (ref 82–98)
MONOCYTES # BLD AUTO: 1.2 K/UL (ref 0.3–1)
MONOCYTES # BLD AUTO: 1.5 K/UL (ref 0.3–1)
MONOCYTES NFR BLD: 5 % (ref 4–15)
MONOCYTES NFR BLD: 6.9 % (ref 4–15)
NEUTROPHILS # BLD AUTO: 17.3 K/UL (ref 1.8–7.7)
NEUTROPHILS # BLD AUTO: 20.6 K/UL (ref 1.8–7.7)
NEUTROPHILS NFR BLD: 82.3 % (ref 38–73)
NEUTROPHILS NFR BLD: 86.8 % (ref 38–73)
NRBC BLD-RTO: 0 /100 WBC
NRBC BLD-RTO: 0 /100 WBC
OVALOCYTES BLD QL SMEAR: ABNORMAL
PHOSPHATE SERPL-MCNC: 3.2 MG/DL (ref 2.7–4.5)
PLATELET # BLD AUTO: 552 K/UL (ref 150–450)
PLATELET # BLD AUTO: 622 K/UL (ref 150–450)
PLATELET BLD QL SMEAR: ABNORMAL
PMV BLD AUTO: 8.8 FL (ref 9.2–12.9)
PMV BLD AUTO: 8.8 FL (ref 9.2–12.9)
POIKILOCYTOSIS BLD QL SMEAR: SLIGHT
POLYCHROMASIA BLD QL SMEAR: ABNORMAL
POTASSIUM SERPL-SCNC: 3.9 MMOL/L (ref 3.5–5.1)
PROT SERPL-MCNC: 6.3 G/DL (ref 6–8.4)
PROTHROMBIN TIME: 12.4 SEC (ref 9–12.5)
RBC # BLD AUTO: 2.41 M/UL (ref 4–5.4)
RBC # BLD AUTO: 2.67 M/UL (ref 4–5.4)
SODIUM SERPL-SCNC: 136 MMOL/L (ref 136–145)
VANCOMYCIN TROUGH SERPL-MCNC: 12.8 UG/ML (ref 10–22)
WBC # BLD AUTO: 21.04 K/UL (ref 3.9–12.7)
WBC # BLD AUTO: 23.71 K/UL (ref 3.9–12.7)

## 2023-02-09 PROCEDURE — 25000003 PHARM REV CODE 250: Performed by: NURSE PRACTITIONER

## 2023-02-09 PROCEDURE — 94761 N-INVAS EAR/PLS OXIMETRY MLT: CPT

## 2023-02-09 PROCEDURE — 63600175 PHARM REV CODE 636 W HCPCS: Performed by: INTERNAL MEDICINE

## 2023-02-09 PROCEDURE — A4216 STERILE WATER/SALINE, 10 ML: HCPCS | Performed by: NURSE PRACTITIONER

## 2023-02-09 PROCEDURE — 85025 COMPLETE CBC W/AUTO DIFF WBC: CPT | Mod: 91 | Performed by: STUDENT IN AN ORGANIZED HEALTH CARE EDUCATION/TRAINING PROGRAM

## 2023-02-09 PROCEDURE — 25000003 PHARM REV CODE 250

## 2023-02-09 PROCEDURE — 93010 EKG 12-LEAD: ICD-10-PCS | Mod: 76,,, | Performed by: INTERNAL MEDICINE

## 2023-02-09 PROCEDURE — 99232 SBSQ HOSP IP/OBS MODERATE 35: CPT | Mod: ,,, | Performed by: STUDENT IN AN ORGANIZED HEALTH CARE EDUCATION/TRAINING PROGRAM

## 2023-02-09 PROCEDURE — 20600001 HC STEP DOWN PRIVATE ROOM

## 2023-02-09 PROCEDURE — 63600175 PHARM REV CODE 636 W HCPCS: Performed by: STUDENT IN AN ORGANIZED HEALTH CARE EDUCATION/TRAINING PROGRAM

## 2023-02-09 PROCEDURE — 25000003 PHARM REV CODE 250: Performed by: STUDENT IN AN ORGANIZED HEALTH CARE EDUCATION/TRAINING PROGRAM

## 2023-02-09 PROCEDURE — 84100 ASSAY OF PHOSPHORUS: CPT | Performed by: STUDENT IN AN ORGANIZED HEALTH CARE EDUCATION/TRAINING PROGRAM

## 2023-02-09 PROCEDURE — 99223 PR INITIAL HOSPITAL CARE,LEVL III: ICD-10-PCS | Mod: ,,, | Performed by: STUDENT IN AN ORGANIZED HEALTH CARE EDUCATION/TRAINING PROGRAM

## 2023-02-09 PROCEDURE — 85730 THROMBOPLASTIN TIME PARTIAL: CPT | Performed by: STUDENT IN AN ORGANIZED HEALTH CARE EDUCATION/TRAINING PROGRAM

## 2023-02-09 PROCEDURE — 80053 COMPREHEN METABOLIC PANEL: CPT | Performed by: STUDENT IN AN ORGANIZED HEALTH CARE EDUCATION/TRAINING PROGRAM

## 2023-02-09 PROCEDURE — 85025 COMPLETE CBC W/AUTO DIFF WBC: CPT | Performed by: STUDENT IN AN ORGANIZED HEALTH CARE EDUCATION/TRAINING PROGRAM

## 2023-02-09 PROCEDURE — 86900 BLOOD TYPING SEROLOGIC ABO: CPT | Performed by: STUDENT IN AN ORGANIZED HEALTH CARE EDUCATION/TRAINING PROGRAM

## 2023-02-09 PROCEDURE — 80202 ASSAY OF VANCOMYCIN: CPT | Performed by: STUDENT IN AN ORGANIZED HEALTH CARE EDUCATION/TRAINING PROGRAM

## 2023-02-09 PROCEDURE — 83735 ASSAY OF MAGNESIUM: CPT | Performed by: STUDENT IN AN ORGANIZED HEALTH CARE EDUCATION/TRAINING PROGRAM

## 2023-02-09 PROCEDURE — 99223 1ST HOSP IP/OBS HIGH 75: CPT | Mod: ,,, | Performed by: STUDENT IN AN ORGANIZED HEALTH CARE EDUCATION/TRAINING PROGRAM

## 2023-02-09 PROCEDURE — 85610 PROTHROMBIN TIME: CPT | Performed by: STUDENT IN AN ORGANIZED HEALTH CARE EDUCATION/TRAINING PROGRAM

## 2023-02-09 PROCEDURE — 99232 PR SUBSEQUENT HOSPITAL CARE,LEVL II: ICD-10-PCS | Mod: ,,, | Performed by: STUDENT IN AN ORGANIZED HEALTH CARE EDUCATION/TRAINING PROGRAM

## 2023-02-09 PROCEDURE — 93005 ELECTROCARDIOGRAM TRACING: CPT

## 2023-02-09 PROCEDURE — 25000003 PHARM REV CODE 250: Performed by: INTERNAL MEDICINE

## 2023-02-09 PROCEDURE — 93010 ELECTROCARDIOGRAM REPORT: CPT | Mod: ,,, | Performed by: INTERNAL MEDICINE

## 2023-02-09 PROCEDURE — 27000221 HC OXYGEN, UP TO 24 HOURS

## 2023-02-09 PROCEDURE — 36415 COLL VENOUS BLD VENIPUNCTURE: CPT | Performed by: STUDENT IN AN ORGANIZED HEALTH CARE EDUCATION/TRAINING PROGRAM

## 2023-02-09 RX ORDER — METOPROLOL TARTRATE 25 MG/1
12.5 TABLET ORAL 2 TIMES DAILY
Status: DISCONTINUED | OUTPATIENT
Start: 2023-02-09 | End: 2023-02-09

## 2023-02-09 RX ORDER — METOPROLOL TARTRATE 25 MG/1
12.5 TABLET ORAL ONCE
Status: COMPLETED | OUTPATIENT
Start: 2023-02-09 | End: 2023-02-09

## 2023-02-09 RX ORDER — HYDROCODONE BITARTRATE AND ACETAMINOPHEN 500; 5 MG/1; MG/1
TABLET ORAL
Status: DISCONTINUED | OUTPATIENT
Start: 2023-02-09 | End: 2023-02-10

## 2023-02-09 RX ORDER — METOPROLOL TARTRATE 25 MG/1
25 TABLET, FILM COATED ORAL 2 TIMES DAILY
Status: DISCONTINUED | OUTPATIENT
Start: 2023-02-09 | End: 2023-02-10

## 2023-02-09 RX ADMIN — GABAPENTIN 400 MG: 400 CAPSULE ORAL at 08:02

## 2023-02-09 RX ADMIN — VANCOMYCIN HYDROCHLORIDE 1500 MG: 1.5 INJECTION, POWDER, LYOPHILIZED, FOR SOLUTION INTRAVENOUS at 08:02

## 2023-02-09 RX ADMIN — PROMETHAZINE HYDROCHLORIDE AND CODEINE PHOSPHATE 5 ML: 6.25; 1 SOLUTION ORAL at 11:02

## 2023-02-09 RX ADMIN — DULOXETINE 60 MG: 60 CAPSULE, DELAYED RELEASE ORAL at 08:02

## 2023-02-09 RX ADMIN — SODIUM CHLORIDE, SODIUM LACTATE, POTASSIUM CHLORIDE, AND CALCIUM CHLORIDE 500 ML: .6; .31; .03; .02 INJECTION, SOLUTION INTRAVENOUS at 09:02

## 2023-02-09 RX ADMIN — SODIUM CHLORIDE, POTASSIUM CHLORIDE, SODIUM LACTATE AND CALCIUM CHLORIDE 1000 ML: 600; 310; 30; 20 INJECTION, SOLUTION INTRAVENOUS at 09:02

## 2023-02-09 RX ADMIN — MUPIROCIN: 20 OINTMENT TOPICAL at 09:02

## 2023-02-09 RX ADMIN — PROMETHAZINE HYDROCHLORIDE AND CODEINE PHOSPHATE 5 ML: 6.25; 1 SOLUTION ORAL at 08:02

## 2023-02-09 RX ADMIN — CLONAZEPAM 0.5 MG: 0.5 TABLET ORAL at 08:02

## 2023-02-09 RX ADMIN — PROMETHAZINE HYDROCHLORIDE AND CODEINE PHOSPHATE 5 ML: 6.25; 1 SOLUTION ORAL at 06:02

## 2023-02-09 RX ADMIN — CLONAZEPAM 0.5 MG: 0.5 TABLET ORAL at 10:02

## 2023-02-09 RX ADMIN — Medication 10 ML: at 06:02

## 2023-02-09 RX ADMIN — ATORVASTATIN CALCIUM 20 MG: 20 TABLET, FILM COATED ORAL at 08:02

## 2023-02-09 RX ADMIN — Medication 9 MG: at 08:02

## 2023-02-09 RX ADMIN — Medication 10 ML: at 12:02

## 2023-02-09 RX ADMIN — AMITRIPTYLINE HYDROCHLORIDE 75 MG: 50 TABLET, FILM COATED ORAL at 08:02

## 2023-02-09 RX ADMIN — PROMETHAZINE HYDROCHLORIDE AND CODEINE PHOSPHATE 5 ML: 6.25; 1 SOLUTION ORAL at 04:02

## 2023-02-09 RX ADMIN — VANCOMYCIN HYDROCHLORIDE 1500 MG: 1.5 INJECTION, POWDER, LYOPHILIZED, FOR SOLUTION INTRAVENOUS at 07:02

## 2023-02-09 RX ADMIN — METOPROLOL TARTRATE 12.5 MG: 25 TABLET, FILM COATED ORAL at 11:02

## 2023-02-09 RX ADMIN — LEVOFLOXACIN 750 MG: 750 TABLET, FILM COATED ORAL at 08:02

## 2023-02-09 RX ADMIN — GABAPENTIN 400 MG: 400 CAPSULE ORAL at 04:02

## 2023-02-09 RX ADMIN — LEVOTHYROXINE SODIUM 50 MCG: 50 TABLET ORAL at 06:02

## 2023-02-09 RX ADMIN — BUPROPION HYDROCHLORIDE 300 MG: 150 TABLET, FILM COATED, EXTENDED RELEASE ORAL at 08:02

## 2023-02-09 RX ADMIN — METOPROLOL TARTRATE 12.5 MG: 25 TABLET, FILM COATED ORAL at 06:02

## 2023-02-09 NOTE — PROGRESS NOTES
"[facility]phane [facility] - Telemetry [facility] Medicine  Progress Note    Patient Name: Skyla Ernandez  MRN: [id number]  Patient Class: IP- Inpatient   Admission Date: 2/6/2023  Length of Stay: 3 days  Attending Physician: Peggy Hobbs MD  Primary Care Provider: Primary Doctor No        Subjective:     Principal Problem:Hemoptysis        HPI:  Per ICU hand off : " 51 yo female with hypertension, hyperlipidemia, and thyroid disease who presented to [facility] Emergency Department this morning for hemoptysis. She reports 3 weeks of cough in which she first noticed blood tinged sputum 2 weeks ago. She reports that the cough has been productive of whitish brown sputum which became darker and eventually appeared dark red. The cough often interferes with sleep. She has also experienced shortness of breath over the past couple of days. She endorses sore throat, RUSSELL, and 2-3 pillow orthopnea. This morning she coughed up over a cup of bright red blood and decided to present to the ED. She denies any chronic tobacco history apart from occasional smoking cigarettes in her late teens. She denies piror homelessness, incarceration, close contacts with TB, or recent travel. She denies having a history of asthma, COPD, cirrhosis or nose bleeds but does report history of scoliosis.     Workup at the [facility] ED includes leukocytosis to 35K, hemoglobin of 6.8 now s/p 1 unit of pRBC's, thrombocytosis to 882, NT-proBNP 1640, troponin negative, and lactic acid 1.8. CXR diffuse patchy opacities scattered throughout the right middle and right lower lung zone which may be seen with pneumonia, aspiration, or edema. CTA of the chest showed no evidence of pulmonary embolism. Solid mass in the superior aspect of the right lung concerning for malignancy. Mass is partially necrotic with air bubbles peripherally. Mass abuts the pericardium and mediastinum. There is associated atelectasis/consolidation of the right middle lobe. " "Patchy opacities in the right lower lobe as well as the left lung which is nonspecific but may represent atelectasis, infectious process, or hemorrhage. She was started on levofloxacin. She was noted to be tachycardic to 100's with soft pressures 87//73 and stable oxygenation on 2LNC.     Patient transferred to [facility] medical intensive care unit for higher level of care."      Overview/Hospital Course:  Per ICU hand off: Patient admitted to MICU for further evaluation and management of hemoptysis. Hb of 6.7, received a second unit of pRBC's as patient continued to have cough with blood tinged sputum. On levofloxacin and vancomycin given leukocytosis, fever, and concerned for pneumonia. Patient remains HDS and satting well on 4L NC. Hypoxic respiratory failure likely 2/2 pneumonia vs hemoptysis vs lung mass. S/p bronchoscopy on 2/7 with BAL performed which was significant for grossly normal airways apart from sticky thick white mucus. Results from BAL unremarkable so far and cultures NGTD. Patient continues to be HDS and satting well on LFNC. Hb stable without any further hemoptysis for over 24 hours. Patient is medically ready for step down to hospital medicine.           Interval History:  Overnight events of hemoptysis noted.  Patient reports  soreness in lateral chest wall muscles from cough.  Heart rate noted in 130s to 150s.  EKG consistent with a flutter.  Starting metoprolol 12.5 b.i.d..  Holding Eliquis in the setting of hemoptysis. Consulted EP    Review of Systems  Objective:     Vital Signs (Most Recent):  Temp: 98.4 °F (36.9 °C) (02/09/23 1134)  Pulse: (!) 130 (02/09/23 1153)  Resp: 18 (02/09/23 1134)  BP: (!) 100/58 (02/09/23 1134)  SpO2: 95 % (02/09/23 1134)   Vital Signs (24h Range):  Temp:  [97.9 °F (36.6 °C)-98.8 °F (37.1 °C)] 98.4 °F (36.9 °C)  Pulse:  [] 130  Resp:  [16-35] 18  SpO2:  [92 %-97 %] 95 %  BP: ()/(56-59) 100/58     Weight: 99 kg (218 lb 4.1 oz)  Body mass index is 36.32 " kg/m².    Intake/Output Summary (Last 24 hours) at 2/9/2023 1435  Last data filed at 2/8/2023 1500  Gross per 24 hour   Intake 10 ml   Output 350 ml   Net -340 ml      Physical Exam  Vitals and nursing note reviewed.   Constitutional:       General: She is not in acute distress.     Appearance: She is obese. She is not ill-appearing or diaphoretic.   HENT:      Head: Normocephalic and atraumatic.      Right Ear: External ear normal.      Left Ear: External ear normal.      Nose: Nose normal.      Comments: Nasal cannula in place, no obvious bleeding in nares or mouth     Mouth/Throat:      Mouth: Mucous membranes are dry.      Pharynx: Oropharynx is clear. No oropharyngeal exudate or posterior oropharyngeal erythema.      Comments: No obvious bleeding in mouth or throat  Eyes:      General: No scleral icterus.        Right eye: No discharge.         Left eye: No discharge.      Extraocular Movements: Extraocular movements intact.      Conjunctiva/sclera: Conjunctivae normal.   Cardiovascular:      Rate and Rhythm: Normal rate and regular rhythm.      Heart sounds: Normal heart sounds. No murmur heard.  Pulmonary:      Effort: Pulmonary effort is normal. No respiratory distress.      Breath sounds: Rhonchi present. No wheezing or rales.   Abdominal:      General: Abdomen is flat. There is no distension.      Palpations: Abdomen is soft. There is no mass.      Tenderness: There is no abdominal tenderness. There is no guarding or rebound.   Musculoskeletal:         General: No swelling or deformity. Normal range of motion.      Cervical back: Normal range of motion and neck supple.      Right lower leg: No edema.      Left lower leg: No edema.   Skin:     General: Skin is warm.      Coloration: Skin is pale. Skin is not jaundiced.      Findings: No bruising.   Neurological:      Mental Status: She is alert and oriented to person, place, and time.   Psychiatric:         Mood and Affect: Mood normal.         Behavior:  Behavior normal.         Thought Content: Thought content normal.       MELD-Na score: 8 at 2/9/2023  9:39 AM  MELD score: 8 at 2/9/2023  9:39 AM  Calculated from:  Serum Creatinine: 0.6 mg/dL (Using min of 1 mg/dL) at 2/9/2023  9:39 AM  Serum Sodium: 136 mmol/L at 2/9/2023  9:39 AM  Total Bilirubin: 0.4 mg/dL (Using min of 1 mg/dL) at 2/9/2023  9:39 AM  INR(ratio): 1.2 at 2/9/2023  9:39 AM  Age: 52 years    Significant Labs:  CBC:  Recent Labs   Lab 02/08/23 0331 02/08/23 2032 02/09/23  0939   WBC 24.72*  --  23.71*   HGB 7.2* 7.1* 7.6*   HCT 23.4* 22.6* 24.4*   *  --  622*     CMP:  Recent Labs   Lab 02/08/23 0331 02/08/23 2032 02/09/23  0939    138 136   K 4.3 4.0 3.9   CL 99 97 97   CO2 26 29 28   * 168* 128*   BUN 19 18 15   CREATININE 0.7 0.7 0.6   CALCIUM 9.7 9.2 9.2   PROT 6.5  --  6.3   ALBUMIN 1.5*  --  1.4*   BILITOT 0.5  --  0.4   ALKPHOS 224*  --  229*   AST 19  --  49*   ALT 22  --  39   ANIONGAP 13 12 11     PTINR:  Recent Labs   Lab 02/08/23 0331 02/09/23  0939   INR 1.2 1.2           Assessment/Plan:      * Hemoptysis  Patient with cough for 3 weeks and coughed up a cup of blood prior to presentation  Not on blood thinners, non-tobacco smoker, heavy marijuana smoker, no lung disease history   CTA at outside hospital negative for PE but there was a solid mass in the superior aspect of the right lung concerning for malignancy.  Mass is partially necrotic with air bubbles peripherally.    At outside hospital Hg 6.8, was given 1 unit pRBC   Was also given nebulized TXA at outside hospital   2/8: Hemoglobin remaining stable with resolution of hemoptysis    - Discontinue nebulized TXA  - Monitor oxygen   - s/p bronch on 2/7, see procedure note  - Consult IR for possible emoblization if uncontrolled bleeding   - prn codeine-promethazine for cough  - Trend CBC   - monitor coags      Atrial flutter  On 02/09, HR noted in 130s to 150s.  Patient remained asymptomatic, denied  palpitations, chest pain, shortness of breath, lightheadedness or dizziness.  EKG obtained, consistent with a flutter with RVR.  Consulted Cardiology, recommend consulting EP.  Started metoprolol 12.5 b.i.d.. Holding anticoagulation given hemoptysis.      Anxiety and depression  - continue home bupropion, amitriptyline, clonazepam, and duloxetine.        Neuropathy  - home gabapentin, amitriptyline, duloxetine       Acute hypoxemic respiratory failure  Likely due to hemoptysis, lung mass, and possible pneumonia      - Vanc/levofloxacin   - CBC daily   - Supplemental O2 as needed     Thyroid disease  Continue home synthroid       Anemia  Hg 6.8 at outside hospital, was given 1 unit pRBC   Here with hemoptysis   Repeat Hb noted at 6.7, received a second unit of pRBC's  2/8: hemoptysis resolved and hemoglobin stable     - CBC daily   - FU coags   - Transfuse if Hg <7, platelets <10, or platelets <50 and actively bleeding       Mass of upper lobe of right lung  CTA at outside hospital negative for PE but there was a solid mass in the superior aspect of the right lung concerning for malignancy.  Mass is partially necrotic with air bubbles peripherally.    WBC 35 and lacate normal at outside hospital      - Vanc/levofloxacin   - FU cultures   - s/p bronch on 2/7, no mass visualized in airways to biopsy but BAL performed with results pending  - consider inpatient IR consult to biopsy mass           VTE Risk Mitigation (From admission, onward)         Ordered     Reason for No Pharmacological VTE Prophylaxis  Once        Question:  Reasons:  Answer:  Active Bleeding    02/06/23 1846     IP VTE HIGH RISK PATIENT  Once         02/06/23 1846     Place sequential compression device  Until discontinued         02/06/23 1846                Discharge Planning   MICHELE: 2/11/2023     Code Status: Full Code   Is the patient medically ready for discharge?:     Reason for patient still in hospital (select all that apply): Patient  trending condition and Consult recommendations  Discharge Plan A: Home with family                  Peggy Hobbs MD  Department of Hospital Medicine   [facility] - Telemetry Stepdown

## 2023-02-09 NOTE — AI DETERIORATION ALERT
RAPID RESPONSE NURSE PROACTIVE ROUNDING NOTE       Time of Visit: 0840    Admit Date: 2023  LOS: 3  Code Status: Full Code   Date of Visit: 2023  : 1970  Age: 52 y.o.  Sex: female  Race: White  Bed: 8099/8099 A:   MRN: [id number]  Was the patient discharged from an ICU this admission? Yes   Was the patient discharged from a PACU within last 24 hours? No   Did the patient receive conscious sedation/general anesthesia in last 24 hours? No  Was the patient in the ED within the past 24 hours? No  Was the patient on NIPPV within the past 24 hours? No   Attending Physician: Peggy Hobbs MD  Primary Service: [facility]   Time spent at the bedside: < 15 min    SITUATION    Notified by Spyra patient alert.  Reason for alert: tachycardia  Called to evaluate the patient for Dysrythmia    BACKGROUND     Why is the patient in the hospital?: Hemoptysis    Patient has a past medical history of Hypertension, Mixed hyperlipidemia, and Thyroid disease.    Last Vitals:  Temp: 98.8 °F (37.1 °C) (752)  Pulse: 135 (803)  Resp: 18 (752)  BP: 91/56 (752)  SpO2: 93 % (803)    24 Hours Vitals Range:  Temp:  [97.9 °F (36.6 °C)-98.8 °F (37.1 °C)]   Pulse:  []   Resp:  [16-31]   BP: ()/(56-73)   SpO2:  [92 %-97 %]     Labs:  Recent Labs     23  0939   WBC 32.21* 24.72*  --  23.71*   HGB 6.7* 7.2* 7.1* 7.6*   HCT 21.2* 23.4* 22.6* 24.4*   * 651*  --  622*       Recent Labs     23  0939    138 138 136   K 4.7 4.3 4.0 3.9   CL 99 99 97 97   CO2 24 26 29 28   CREATININE 0.8 0.7 0.7 0.6   * 118* 168* 128*   PHOS 6.4* 4.5  --  3.2   MG 1.5* 1.9  --  1.7        No results for input(s): PH, PCO2, PO2, HCO3, POCSATURATED, BE in the last 72 hours.     ASSESSMENT    Physical Exam  Vitals and nursing note reviewed.   Neurological:      Mental Status: She is alert.       GCS: GCS eye subscore is 4. GCS verbal subscore is 5. GCS motor subscore is 6.       INTERVENTIONS    The patient was seen for Cardiac problem. Staff concerns included tachycardia. The following interventions were performed: normal saline 500 ml IV bolus .    RECOMMENDATIONS    Patient awake alert and orientated. Patient states she doesn't feel any different then yesterday. MD ordered 500ml Bolus. Maintain IV access, Continue pulse ox and tele     PROVIDER ESCALATION    Yes/No  no    Orders received and case discussed with NA.    Disposition: Remain in room 8099.    FOLLOW-UP    charge Deandra MCKEE  updated on plan of care. Instructed to call the Rapid Response Nurse, Arnav Oliveros RN at [phone] for additional questions or concerns.

## 2023-02-09 NOTE — ASSESSMENT & PLAN NOTE
Review of ekg reveals CTI independent atrial flutter.   Can continue Bb with lopressor but consider increasing the rate to 25 mg bid  Can cont to hold AC in setting of hemoptysis  If she becomes hemodynamically unstable while in flutter with rvr can cardiovert

## 2023-02-09 NOTE — SUBJECTIVE & OBJECTIVE
Past Medical History:   Diagnosis Date    Hypertension     Mixed hyperlipidemia     Thyroid disease        Past Surgical History:   Procedure Laterality Date    ABDOMINAL SURGERY      TONSILLECTOMY         Review of patient's allergies indicates:   Allergen Reactions    Penicillins Hives       No current facility-administered medications on file prior to encounter.     Current Outpatient Medications on File Prior to Encounter   Medication Sig    amitriptyline (ELAVIL) 75 MG tablet Take 75 mg by mouth every evening.    atorvastatin (LIPITOR) 20 MG tablet Take 20 mg by mouth once daily.    buPROPion (WELLBUTRIN XL) 300 MG 24 hr tablet Take 300 mg by mouth once daily.    clonazePAM (KLONOPIN) 0.5 MG tablet Take 0.5 mg by mouth 3 (three) times daily as needed.    diclofenac (VOLTAREN) 75 MG EC tablet Take 75 mg by mouth 2 (two) times daily.    DULoxetine (CYMBALTA) 60 MG capsule Take 60 mg by mouth 2 (two) times daily.    gabapentin (NEURONTIN) 400 MG capsule Take 400 mg by mouth 3 (three) times daily.    hydroCHLOROthiazide (HYDRODIURIL) 25 MG tablet Take 25 mg by mouth once daily.    losartan (COZAAR) 100 MG tablet Take 100 mg by mouth once daily.    acetaminophen (TYLENOL) 500 MG tablet Take 500 mg by mouth every 6 (six) hours as needed for Pain.    mv,Ca,min-folic acid-vit K1 (ONE-A-DAY WOMEN'S 50 PLUS) 400-20 mcg Tab Take 1 tablet by mouth once daily.    tiZANidine (ZANAFLEX) 4 MG tablet Take 4 mg by mouth 2 (two) times daily.     Family History    None       Tobacco Use    Smoking status: Former     Types: Cigarettes    Smokeless tobacco: Never   Substance and Sexual Activity    Alcohol use: Never    Drug use: Not on file    Sexual activity: Not on file     Review of Systems   Constitutional: Positive for night sweats. Negative for diaphoresis.   HENT: Negative.     Eyes: Negative.    Cardiovascular:  Positive for dyspnea on exertion. Negative for chest pain, irregular heartbeat, near-syncope, orthopnea,  palpitations and syncope.   Respiratory:  Positive for cough, hemoptysis and shortness of breath. Negative for sputum production.    Gastrointestinal:  Negative for abdominal pain, change in bowel habit, hematemesis, hematochezia, melena, nausea and vomiting.   Objective:     Vital Signs (Most Recent):  Temp: 98.3 °F (36.8 °C) (02/09/23 1458)  Pulse: (!) 128 (02/09/23 1458)  Resp: 18 (02/09/23 1616)  BP: 99/68 (02/09/23 1458)  SpO2: 95 % (02/09/23 1458)   Vital Signs (24h Range):  Temp:  [98.3 °F (36.8 °C)-98.8 °F (37.1 °C)] 98.3 °F (36.8 °C)  Pulse:  [] 128  Resp:  [16-35] 18  SpO2:  [93 %-97 %] 95 %  BP: ()/(56-68) 99/68       Weight: 99 kg (218 lb 4.1 oz)  Body mass index is 36.32 kg/m².    SpO2: 95 %       Physical Exam  Vitals reviewed.   Constitutional:       Appearance: Normal appearance. She is not ill-appearing.   HENT:      Head: Normocephalic and atraumatic.   Eyes:      General: No scleral icterus.     Conjunctiva/sclera: Conjunctivae normal.   Cardiovascular:      Rate and Rhythm: Normal rate. Rhythm irregular.   Pulmonary:      Effort: Pulmonary effort is normal.   Abdominal:      General: There is no distension.   Musculoskeletal:         General: No swelling.      Right lower leg: No edema.      Left lower leg: No edema.   Skin:     General: Skin is warm.   Neurological:      Mental Status: She is alert.   Psychiatric:         Mood and Affect: Mood normal.       Significant Labs: All pertinent lab results from the last 24 hours have been reviewed.    Significant Imaging: Echocardiogram: Transthoracic echo (TTE) complete (Cupid Only): No results found for this or any previous visit.

## 2023-02-09 NOTE — NURSING
Notified Lalo HWANG that pt was coughing up blood. Lalo HWANG stated that he would order labs and come by to see the pt.

## 2023-02-09 NOTE — PLAN OF CARE
Problem: Adult Inpatient Plan of Care  Goal: Plan of Care Review  Outcome: Ongoing, Progressing  Goal: Patient-Specific Goal (Individualized)  Outcome: Ongoing, Progressing  Goal: Absence of Hospital-Acquired Illness or Injury  Outcome: Ongoing, Progressing  Goal: Optimal Comfort and Wellbeing  Outcome: Ongoing, Progressing  Goal: Readiness for Transition of Care  Outcome: Ongoing, Progressing     Problem: Skin Injury Risk Increased  Goal: Skin Health and Integrity  Outcome: Ongoing, Progressing     Problem: Gas Exchange Impaired  Goal: Optimal Gas Exchange  Outcome: Ongoing, Progressing     Problem: Infection  Goal: Absence of Infection Signs and Symptoms  Outcome: Ongoing, Progressing     Problem: Fall Injury Risk  Goal: Absence of Fall and Fall-Related Injury  Outcome: Ongoing, Progressing

## 2023-02-09 NOTE — SIGNIFICANT EVENT
Paged by nursing that this evening approx 1915 patient with coughing spell x 10 minutes     Family at bedside reports pt coughing into paper towels - thin blood secretions.  In a bedside container they estimate total she may have coughed up is 100-200cc.     Pt admitted with hemoptysis suspected due to new right lung mass    At bedside patient on 7L oxygen, sat %.  Patient denies any chest pain, in no acute respiratory ditress,  bilateral inspiratory and expiratory wheezing is noted, R>L,  no stridor   Tachycardic with regular rhythm    Have requested patient to cough any further secretions into the marked suction container at bedside.     Plan  -stat labs - H/H, ABG, BMP  -switch to high flow nasal cannula   -lie in right lateral decubitus position  -give PRN duo neb now   -discussed with bedside RYLEE May M.D.  Attending Physician  [facility] Medicine Dept.  Pager: [phone]  Spectralink -x [phone]

## 2023-02-09 NOTE — CONSULTS
[facility] - Telemetry Stepdown  Cardiac Electrophysiology  Consult Note    Admission Date: 2/6/2023  Code Status: Full Code   Attending Provider: Peggy Hobbs MD  Consulting Provider: Flory Bell MD  Principal Problem:Hemoptysis    Inpatient consult to Electrophysiology  Consult performed by: Flory Bell MD  Consult ordered by: Peggy Hobbs MD        Subjective:     Chief Complaint:    HPI:   Patient is a 52 y.o female for whom EP has been consulted for atrial flutter.  She has a history of htn, hld, thyroid disease who presented to [facility]) for hemoptysis.   CT chest concerning for pna vs malignancy. S/p bronch on 2/7 with BAL performed which was significant for grossly normal airways apart from sticky thick white mucus. Results from BAL unremarkable so far and cultures NGTD.  Has received 2 U pRBC during hospitalization.   Pt was found to be tachycardic during hospitalization. EKG revealed atypical atrial flutter.  Placed on Lopressor. EP subsequently consulted.      Past Medical History:   Diagnosis Date    Hypertension     Mixed hyperlipidemia     Thyroid disease        Past Surgical History:   Procedure Laterality Date    ABDOMINAL SURGERY      TONSILLECTOMY         Review of patient's allergies indicates:   Allergen Reactions    Penicillins Hives       No current facility-administered medications on file prior to encounter.     Current Outpatient Medications on File Prior to Encounter   Medication Sig    amitriptyline (ELAVIL) 75 MG tablet Take 75 mg by mouth every evening.    atorvastatin (LIPITOR) 20 MG tablet Take 20 mg by mouth once daily.    buPROPion (WELLBUTRIN XL) 300 MG 24 hr tablet Take 300 mg by mouth once daily.    clonazePAM (KLONOPIN) 0.5 MG tablet Take 0.5 mg by mouth 3 (three) times daily as needed.    diclofenac (VOLTAREN) 75 MG EC tablet Take 75 mg by mouth 2 (two) times daily.    DULoxetine (CYMBALTA) 60 MG capsule Take 60 mg  by mouth 2 (two) times daily.    gabapentin (NEURONTIN) 400 MG capsule Take 400 mg by mouth 3 (three) times daily.    hydroCHLOROthiazide (HYDRODIURIL) 25 MG tablet Take 25 mg by mouth once daily.    losartan (COZAAR) 100 MG tablet Take 100 mg by mouth once daily.    acetaminophen (TYLENOL) 500 MG tablet Take 500 mg by mouth every 6 (six) hours as needed for Pain.    mv,Ca,min-folic acid-vit K1 (ONE-A-DAY WOMEN'S 50 PLUS) 400-20 mcg Tab Take 1 tablet by mouth once daily.    tiZANidine (ZANAFLEX) 4 MG tablet Take 4 mg by mouth 2 (two) times daily.     Family History    None       Tobacco Use    Smoking status: Former     Types: Cigarettes    Smokeless tobacco: Never   Substance and Sexual Activity    Alcohol use: Never    Drug use: Not on file    Sexual activity: Not on file     Review of Systems   Constitutional: Positive for night sweats. Negative for diaphoresis.   HENT: Negative.     Eyes: Negative.    Cardiovascular:  Positive for dyspnea on exertion. Negative for chest pain, irregular heartbeat, near-syncope, orthopnea, palpitations and syncope.   Respiratory:  Positive for cough, hemoptysis and shortness of breath. Negative for sputum production.    Gastrointestinal:  Negative for abdominal pain, change in bowel habit, hematemesis, hematochezia, melena, nausea and vomiting.   Objective:     Vital Signs (Most Recent):  Temp: 98.3 °F (36.8 °C) (02/09/23 1458)  Pulse: (!) 128 (02/09/23 1458)  Resp: 18 (02/09/23 1616)  BP: 99/68 (02/09/23 1458)  SpO2: 95 % (02/09/23 1458)   Vital Signs (24h Range):  Temp:  [98.3 °F (36.8 °C)-98.8 °F (37.1 °C)] 98.3 °F (36.8 °C)  Pulse:  [] 128  Resp:  [16-35] 18  SpO2:  [93 %-97 %] 95 %  BP: ()/(56-68) 99/68       Weight: 99 kg (218 lb 4.1 oz)  Body mass index is 36.32 kg/m².    SpO2: 95 %       Physical Exam  Vitals reviewed.   Constitutional:       Appearance: Normal appearance. She is not ill-appearing.   HENT:      Head: Normocephalic and atraumatic.    Eyes:      General: No scleral icterus.     Conjunctiva/sclera: Conjunctivae normal.   Cardiovascular:      Rate and Rhythm: Normal rate. Rhythm irregular.   Pulmonary:      Effort: Pulmonary effort is normal.   Abdominal:      General: There is no distension.   Musculoskeletal:         General: No swelling.      Right lower leg: No edema.      Left lower leg: No edema.   Skin:     General: Skin is warm.   Neurological:      Mental Status: She is alert.   Psychiatric:         Mood and Affect: Mood normal.       Significant Labs: All pertinent lab results from the last 24 hours have been reviewed.    Significant Imaging: Echocardiogram: Transthoracic echo (TTE) complete (Cupid Only): No results found for this or any previous visit.              Assessment and Plan:     Atrial flutter  Review of ekg reveals CTI independent atrial flutter.   Can continue Bb with lopressor but consider increasing the rate to 25 mg bid  Can cont to hold AC in setting of hemoptysis  If she becomes hemodynamically unstable while in flutter with rvr can cardiovert             Thank you for your consult. I will follow-up with patient. Please contact us if you have any additional questions.    Flory Bell MD  Cardiac Electrophysiology  [facility] - Telemetry Stepdown

## 2023-02-09 NOTE — ASSESSMENT & PLAN NOTE
On 02/09, HR noted in 130s to 150s.  Patient remained asymptomatic, denied palpitations, chest pain, shortness of breath, lightheadedness or dizziness.  EKG obtained, consistent with a flutter with RVR.  Consulted Cardiology, recommend consulting EP.  Started metoprolol 12.5 b.i.d.. Holding anticoagulation given hemoptysis.

## 2023-02-09 NOTE — HOSPITAL COURSE
Patient Instructions for CT Scan    · Your CT scan is being done without any oral contrast.  Your CT scan may be done with IV contrast, if you have an allergy to iodine--please tell your nurse.    · Do not eat or drink 4 hours prior to scan.     · You may take your medications with sips of water, except DO NOT take your diabetic pill the morning of the test.    Arrive at the Outpatient Surgery entrance at [facility] 20 minutes prior to appointment time.    You will receive a phone call with an appointment for your CT scan.  Please call our office, if someone does not contact you with 3 days.    Shannon Majano RN  March 26, 2018  12:22 PM              CT Scan  A computed tomography (CT) scan is a specialized X-ray scan. It uses X-rays and a computer to make pictures of different areas of your body. A CT scan can offer more detailed information than a regular X-ray exam. The CT scan provides data about internal organs, soft tissue structures, blood vessels, and bones.  The CT scanner is a large machine that takes pictures of your body as you move through the opening.  Tell a health care provider about:  · Any allergies you have.  · All medicines you are taking, including vitamins, herbs, eye drops, creams, and over-the-counter medicines.  · Any problems you or family members have had with anesthetic medicines.  · Any blood disorders you have.  · Any surgeries you have had.  · Any medical conditions you have.  What are the risks?  Generally, this is a safe procedure. However, as with any procedure, problems can occur. Possible problems include:  · An allergic reaction to the contrast material.  · Development of cancer from excessive exposure to radiation. The risk of this is small.  What happens before the procedure?  · The day before the test, stop drinking caffeinated beverages. These include energy drinks, tea, soda, coffee, and hot chocolate.  · On the day of the test:  ¨ About 4 hours before  Per ICU hand off: Patient admitted to MICU for further evaluation and management of hemoptysis. Hb of 6.7, received a second unit of pRBC's as patient continued to have cough with blood tinged sputum. On levofloxacin and vancomycin given leukocytosis, fever, and concerned for pneumonia. Patient remains HDS and satting well on 4L NC. Hypoxic respiratory failure likely 2/2 pneumonia vs hemoptysis vs lung mass. S/p bronchoscopy on 2/7 with BAL performed which was significant for grossly normal airways apart from sticky thick white mucus. Results from BAL unremarkable so far and cultures NGTD. Patient continues to be HDS and satting well on LFNC. Hb stable without any further hemoptysis for over 24 hours. Patient is medically ready for step down to hospital medicine.  2/9: Overnight events of hemoptysis noted.  Patient reports  soreness in lateral chest wall muscles from cough.  Heart rate noted in 130s to 150s.  EKG consistent with a flutter.  Starting metoprolol 12.5 b.i.d..  Holding Eliquis in the setting of hemoptysis. Consulted EP    2/10: Received 1 u blood yesterday with improvement in Hb. Denies further hemoptysis. SOB improved. Weaning oxygen. Afebrile  HR persistently elevated aflutter, increasing metoprolol.  Denies chest pain, denies palpitations.     2/13: Rate controlled this am with HR 70-80; pt reports improvement in shortness of breath  Improving cough and states does occasionally have small amount of pinkish tinge to sputum, no overt bleeding    2/14: Patient reports having had episode of hemoptysis last night and again this morning.  Hb 8.7-->7.5-->8.1  Hemodynamically stable, afebrile  Denies worsening shortness of breath    2/15-2/17: cardiac meds adjusted due to low bp readings, losartan stopped and discharged on metoprolol XL 25 mg qam + digoxin. AC on hold due to hemoptysis with anemia. Needs Cardiology clinic f/u (referral sent here) for chf and aflutter management. Pulmonology clinic f/u for  lung mass. Completed 12 days of IV abx which is more than adequate for pna tx. BAL - no malignant cells, cxs negative (yeast). PCP f/u with repeat labs in 1-2 weeks.    the test, stop eating and drinking anything but water as advised by your health care provider.  ¨ Avoid wearing jewelry. You will have to partly or fully undress and wear a hospital gown.  What happens during the procedure?  · You will be asked to lie on a table with your arms above your head.  · If contrast dye is to be used for the test, an IV tube will be inserted in your arm. The contrast dye will be injected into the IV tube. You might feel warm, or you may get a metallic taste in your mouth.  · The table you will be lying on will move into a large machine that will do the scanning.  · You will be able to see, hear, and talk to the person running the machine while you are in it. Follow that person's directions.  · The CT machine will move around you to take pictures. Do not move while it is scanning. This helps to get a good image.  · When the best possible pictures have been taken, the machine will be turned off. The table will be moved out of the machine. The IV tube will then be removed.  What happens after the procedure?  Ask your health care provider when to follow up for your test results.  This information is not intended to replace advice given to you by your health care provider. Make sure you discuss any questions you have with your health care provider.  Document Released: 01/25/2006 Document Revised: 05/25/2017 Document Reviewed: 08/25/2014  Elsevier Interactive Patient Education © 2017 [employer]

## 2023-02-09 NOTE — RESPIRATORY THERAPY
RAPID RESPONSE RESPIRATORY THERAPY PROACTIVE ROUNDING NOTE             Time of visit: 900     Code Status: Full Code   : 1970  Bed: 8099/8099 A:   MRN: [id number]  Time spent at the bedside: < 15 min    SITUATION    Evaluated patient for: HFNC Compliance     BACKGROUND    Patient has a past medical history of Hypertension, Mixed hyperlipidemia, and Thyroid disease.    24 Hours Vitals Range:  Temp:  [97.9 °F (36.6 °C)-98.8 °F (37.1 °C)]   Pulse:  []   Resp:  [16-35]   BP: ()/(56-73)   SpO2:  [92 %-97 %]     Labs:    Recent Labs     23  0246 23  0331 23  0939    138 138 136   K 4.7 4.3 4.0 3.9   CL 99 99 97 97   CO2 24 26 29 28   CREATININE 0.8 0.7 0.7 0.6   * 118* 168* 128*   PHOS 6.4* 4.5  --  3.2   MG 1.5* 1.9  --  1.7        No results for input(s): PH, PCO2, PO2, HCO3, POCSATURATED, BE in the last 72 hours.    ASSESSMENT/INTERVENTIONS    Patient resting. No respiratory concerns at this time.    Last VS   Temp: 98.8 °F (37.1 °C) (752)  Pulse: 134 ( 0900)  Resp: 18 ( 1134)  BP: 91/56 (752)  SpO2: 95 % (900)    Level of Consciousness: Level of Consciousness (AVPU): alert  Respiratory Effort: Respiratory Effort: Unlabored Expansion/Accessory Muscle Usage: Expansion/Accessory Muscles/Retractions: no use of accessory muscles, no retractions, expansion symmetric  All Lung Field Breath Sounds: All Lung Fields Breath Sounds: Anterior:, Lateral:, wheezes, expiratory, equal bilaterally  NIESHA Breath Sounds: wheezes, expiratory  LLL Breath Sounds: wheezes, expiratory  RUL Breath Sounds: wheezes, expiratory  RML Breath Sounds: wheezes, expiratory  RLL Breath Sounds: wheezes, expiratory  O2 Device/Concentration: 7L HFNC  Was the O2 device able to be weaned? No  Ambu at bedside: Ambu bag with the patient?: Yes, Adult Ambu    Active Orders   Respiratory Care    Inhalation Treatment Q4H PRN     Frequency: Q4H PRN     Number of  Occurrences: Until Specified    Oxygen Continuous     Frequency: Continuous     Number of Occurrences: Until Specified     Order Questions:      Device type: High flow      Device: High Flow Nasal Cannula (6 -15 Liters)      LPM: 7      Titrate O2 per Oxygen Titration Protocol: Yes      To maintain SpO2 goal of: >= 92%      Notify MD of: Inability to achieve desired SpO2; Sudden change in patient status and requires 20% increase in FiO2; Patient requires >60% FiO2    POCT ARTERIAL BLOOD GAS Blood Gas     Frequency: Once     Number of Occurrences: 1 Occurrences     Order Comments: Notify Physician if: see parameters below.       Order Questions:      Component: Blood Gas       RECOMMENDATIONS    We recommend: RRT Recs: Continue POC per primary team.      FOLLOW-UP    Please call back the Rapid Response RT, Diane Torres RRT at x [phone] for any questions or concerns.

## 2023-02-09 NOTE — SUBJECTIVE & OBJECTIVE
Interval History:  Overnight events of hemoptysis noted.  Patient reports  soreness in lateral chest wall muscles from cough.  Heart rate noted in 130s to 150s.  EKG consistent with a flutter.  Starting metoprolol 12.5 b.i.d..  Holding Eliquis in the setting of hemoptysis. Consulted EP    Review of Systems  Objective:     Vital Signs (Most Recent):  Temp: 98.4 °F (36.9 °C) (02/09/23 1134)  Pulse: (!) 130 (02/09/23 1153)  Resp: 18 (02/09/23 1134)  BP: (!) 100/58 (02/09/23 1134)  SpO2: 95 % (02/09/23 1134)   Vital Signs (24h Range):  Temp:  [97.9 °F (36.6 °C)-98.8 °F (37.1 °C)] 98.4 °F (36.9 °C)  Pulse:  [] 130  Resp:  [16-35] 18  SpO2:  [92 %-97 %] 95 %  BP: ()/(56-59) 100/58     Weight: 99 kg (218 lb 4.1 oz)  Body mass index is 36.32 kg/m².    Intake/Output Summary (Last 24 hours) at 2/9/2023 1435  Last data filed at 2/8/2023 1500  Gross per 24 hour   Intake 10 ml   Output 350 ml   Net -340 ml      Physical Exam  Vitals and nursing note reviewed.   Constitutional:       General: She is not in acute distress.     Appearance: She is obese. She is not ill-appearing or diaphoretic.   HENT:      Head: Normocephalic and atraumatic.      Right Ear: External ear normal.      Left Ear: External ear normal.      Nose: Nose normal.      Comments: Nasal cannula in place, no obvious bleeding in nares or mouth     Mouth/Throat:      Mouth: Mucous membranes are dry.      Pharynx: Oropharynx is clear. No oropharyngeal exudate or posterior oropharyngeal erythema.      Comments: No obvious bleeding in mouth or throat  Eyes:      General: No scleral icterus.        Right eye: No discharge.         Left eye: No discharge.      Extraocular Movements: Extraocular movements intact.      Conjunctiva/sclera: Conjunctivae normal.   Cardiovascular:      Rate and Rhythm: Normal rate and regular rhythm.      Heart sounds: Normal heart sounds. No murmur heard.  Pulmonary:      Effort: Pulmonary effort is normal. No respiratory  distress.      Breath sounds: Rhonchi present. No wheezing or rales.   Abdominal:      General: Abdomen is flat. There is no distension.      Palpations: Abdomen is soft. There is no mass.      Tenderness: There is no abdominal tenderness. There is no guarding or rebound.   Musculoskeletal:         General: No swelling or deformity. Normal range of motion.      Cervical back: Normal range of motion and neck supple.      Right lower leg: No edema.      Left lower leg: No edema.   Skin:     General: Skin is warm.      Coloration: Skin is pale. Skin is not jaundiced.      Findings: No bruising.   Neurological:      Mental Status: She is alert and oriented to person, place, and time.   Psychiatric:         Mood and Affect: Mood normal.         Behavior: Behavior normal.         Thought Content: Thought content normal.       MELD-Na score: 8 at 2/9/2023  9:39 AM  MELD score: 8 at 2/9/2023  9:39 AM  Calculated from:  Serum Creatinine: 0.6 mg/dL (Using min of 1 mg/dL) at 2/9/2023  9:39 AM  Serum Sodium: 136 mmol/L at 2/9/2023  9:39 AM  Total Bilirubin: 0.4 mg/dL (Using min of 1 mg/dL) at 2/9/2023  9:39 AM  INR(ratio): 1.2 at 2/9/2023  9:39 AM  Age: 52 years    Significant Labs:  CBC:  Recent Labs   Lab 02/08/23 0331 02/08/23 2032 02/09/23  0939   WBC 24.72*  --  23.71*   HGB 7.2* 7.1* 7.6*   HCT 23.4* 22.6* 24.4*   *  --  622*     CMP:  Recent Labs   Lab 02/08/23  0331 02/08/23 2032 02/09/23  0939    138 136   K 4.3 4.0 3.9   CL 99 97 97   CO2 26 29 28   * 168* 128*   BUN 19 18 15   CREATININE 0.7 0.7 0.6   CALCIUM 9.7 9.2 9.2   PROT 6.5  --  6.3   ALBUMIN 1.5*  --  1.4*   BILITOT 0.5  --  0.4   ALKPHOS 224*  --  229*   AST 19  --  49*   ALT 22  --  39   ANIONGAP 13 12 11     PTINR:  Recent Labs   Lab 02/08/23  0331 02/09/23  0939   INR 1.2 1.2

## 2023-02-09 NOTE — NURSING
Pt arrived in room 8099 via bed. Pt family at the bedside. Notified Anjel HWANG of pt's arrival. Pt AAOx4. Pt denies having any pain. No skin issues noted. Coarse breath sounds noted. Frequent cough noted. Radial and pedal pulses +2 bilaterally. Pt in bed with the call light in reach and the bed alarm on.

## 2023-02-09 NOTE — HPI
Patient is a 52 y.o female for whom EP has been consulted for atrial flutter.  She has a history of htn, hld, thyroid disease who [place] [facility] [place] ( [facility]) for hemoptysis.   CT chest concerning for pna vs malignancy. S/p bronch on 2/7 with BAL performed which was significant for grossly normal airways apart from sticky thick white mucus. Results from BAL unremarkable so far and cultures NGTD.  Has received 2 U pRBC during hospitalization.   Pt was found to be tachycardic during hospitalization. EKG revealed atypical atrial flutter.  Placed on Lopressor. EP subsequently consulted.

## 2023-02-09 NOTE — HPI
"Per ICU hand off : " 51 yo female with hypertension, hyperlipidemia, and thyroid disease who presented to [facility] Emergency Department this morning for hemoptysis. She reports 3 weeks of cough in which she first noticed blood tinged sputum 2 weeks ago. She reports that the cough has been productive of whitish brown sputum which became darker and eventually appeared dark red. The cough often interferes with sleep. She has also experienced shortness of breath over the past couple of days. She endorses sore throat, RUSSELL, and 2-3 pillow orthopnea. This morning she coughed up over a cup of bright red blood and decided to present to the ED. She denies any chronic tobacco history apart from occasional smoking cigarettes in her late teens. She denies piror homelessness, incarceration, close contacts with TB, or recent travel. She denies having a history of asthma, COPD, cirrhosis or nose bleeds but does report history of scoliosis.     Workup at the [facility] ED includes leukocytosis to 35K, hemoglobin of 6.8 now s/p 1 unit of pRBC's, thrombocytosis to 882, NT-proBNP 1640, troponin negative, and lactic acid 1.8. CXR diffuse patchy opacities scattered throughout the right middle and right lower lung zone which may be seen with pneumonia, aspiration, or edema. CTA of the chest showed no evidence of pulmonary embolism. Solid mass in the superior aspect of the right lung concerning for malignancy. Mass is partially necrotic with air bubbles peripherally. Mass abuts the pericardium and mediastinum. There is associated atelectasis/consolidation of the right middle lobe. Patchy opacities in the right lower lobe as well as the left lung which is nonspecific but may represent atelectasis, infectious process, or hemorrhage. She was started on levofloxacin. She was noted to be tachycardic to 100's with soft pressures 87//73 and stable oxygenation on 2LNC.     [place] [facility] [place] " "care unit for higher level of care."  "

## 2023-02-09 NOTE — RESPIRATORY THERAPY
RAPID RESPONSE RESPIRATORY THERAPY PROACTIVE NOTE           Time of visit: 900     Code Status: Full Code   : 1970  Bed: 8099/8099 A:   MRN: [id number]  Time spent at the bedside: 15 -30 min    SITUATION    Evaluated patient for: Tachycardia and dizziness    BACKGROUND    Why is the patient in the hospital?: Hemoptysis    Patient has a past medical history of Hypertension, Mixed hyperlipidemia, and Thyroid disease.    24 Hours Vitals Range:  Temp:  [97.9 °F (36.6 °C)-98.8 °F (37.1 °C)]   Pulse:  []   Resp:  [16-35]   BP: ()/(56-73)   SpO2:  [92 %-97 %]     Labs:    Recent Labs     23  0246 23  0939    138 138 136   K 4.7 4.3 4.0 3.9   CL 99 99 97 97   CO2 24 26 29 28   CREATININE 0.8 0.7 0.7 0.6   * 118* 168* 128*   PHOS 6.4* 4.5  --  3.2   MG 1.5* 1.9  --  1.7        No results for input(s): PH, PCO2, PO2, HCO3, POCSATURATED, BE in the last 72 hours.    ASSESSMENT/INTERVENTIONS  When asked how patient had been feeling this morning, she reported dizziness and HR was 130-160. Called [place] Rns to assess, blood pressure normal, and patient eating. No respiratory concerns at this time. Will continue to wean and monitor    Last VS   Temp: 98.8 °F (37.1 °C) (752)  Pulse: 134 (900)  Resp: 18 ( 1134)  BP: 91/56 (752)  SpO2: 95 % (900)    Level of Consciousness: Level of Consciousness (AVPU): alert  Respiratory Effort: Respiratory Effort: Unlabored Expansion/Accessory Muscle Usage: Expansion/Accessory Muscles/Retractions: no use of accessory muscles, no retractions, expansion symmetric  All Lung Field Breath Sounds: All Lung Fields Breath Sounds: Anterior:, Lateral:, wheezes, expiratory, equal bilaterally  NIESHA Breath Sounds: wheezes, expiratory  LLL Breath Sounds: wheezes, expiratory  RUL Breath Sounds: wheezes, expiratory  RML Breath Sounds: wheezes, expiratory  RLL Breath Sounds: wheezes, expiratory  O2  Device/Concentration: 7L HFNC  NIPPV: No Surgical airway: No  ETCO2 monitored:    Ambu at bedside: Ambu bag with the patient?: Yes, Adult Ambu    Active Orders   Respiratory Care    Inhalation Treatment Q4H PRN     Frequency: Q4H PRN     Number of Occurrences: Until Specified    Oxygen Continuous     Frequency: Continuous     Number of Occurrences: Until Specified     Order Questions:      Device type: High flow      Device: High Flow Nasal Cannula (6 -15 Liters)      LPM: 7      Titrate O2 per Oxygen Titration Protocol: Yes      To maintain SpO2 goal of: >= 92%      Notify MD of: Inability to achieve desired SpO2; Sudden change in patient status and requires 20% increase in FiO2; Patient requires >60% FiO2    POCT ARTERIAL BLOOD GAS Blood Gas     Frequency: Once     Number of Occurrences: 1 Occurrences     Order Comments: Notify Physician if: see parameters below.       Order Questions:      Component: Blood Gas       RECOMMENDATIONS    We recommend: RRT Recs: Continue POC per primary team.    FOLLOW-UP    Please call back the Rapid Response RTDiane RRT at x [phone] for any questions or concerns.

## 2023-02-10 PROBLEM — I50.21 ACUTE SYSTOLIC HEART FAILURE: Status: ACTIVE | Noted: 2023-02-10

## 2023-02-10 LAB
1,3 BETA GLUCAN SER-MCNC: <31 PG/ML
ABO + RH BLD: NORMAL
ALBUMIN SERPL BCP-MCNC: 1.4 G/DL (ref 3.5–5.2)
ALP SERPL-CCNC: 193 U/L (ref 55–135)
ALT SERPL W/O P-5'-P-CCNC: 49 U/L (ref 10–44)
ANION GAP SERPL CALC-SCNC: 12 MMOL/L (ref 8–16)
APTT BLDCRRT: 27.7 SEC (ref 21–32)
ASCENDING AORTA: 2.73 CM
AST SERPL-CCNC: 55 U/L (ref 10–40)
AV INDEX (PROSTH): 0.64
AV MEAN GRADIENT: 5 MMHG
AV PEAK GRADIENT: 8 MMHG
AV VALVE AREA: 2.07 CM2
AV VELOCITY RATIO: 0.7
BASOPHILS # BLD AUTO: 0.05 K/UL (ref 0–0.2)
BASOPHILS NFR BLD: 0.2 % (ref 0–1.9)
BILIRUB SERPL-MCNC: 1.2 MG/DL (ref 0.1–1)
BLD GP AB SCN CELLS X3 SERPL QL: NORMAL
BLD PROD TYP BPU: NORMAL
BLOOD UNIT EXPIRATION DATE: NORMAL
BLOOD UNIT TYPE CODE: 6200
BLOOD UNIT TYPE: NORMAL
BSA FOR ECHO PROCEDURE: 2.13 M2
BUN SERPL-MCNC: 13 MG/DL (ref 6–20)
CALCIUM SERPL-MCNC: 9.1 MG/DL (ref 8.7–10.5)
CHLORIDE SERPL-SCNC: 96 MMOL/L (ref 95–110)
CO2 SERPL-SCNC: 29 MMOL/L (ref 23–29)
CODING SYSTEM: NORMAL
CREAT SERPL-MCNC: 0.6 MG/DL (ref 0.5–1.4)
CROSSMATCH INTERPRETATION: NORMAL
CV ECHO LV RWT: 0.33 CM
DIFFERENTIAL METHOD: ABNORMAL
DISPENSE STATUS: NORMAL
DOP CALC AO PEAK VEL: 1.39 M/S
DOP CALC AO VTI: 20.15 CM
DOP CALC LVOT AREA: 3.2 CM2
DOP CALC LVOT DIAMETER: 2.03 CM
DOP CALC LVOT PEAK VEL: 0.97 M/S
DOP CALC LVOT STROKE VOLUME: 41.73 CM3
DOP CALCLVOT PEAK VEL VTI: 12.9 CM
E/E' RATIO: 12.63 M/S
ECHO LV POSTERIOR WALL: 0.82 CM (ref 0.6–1.1)
EJECTION FRACTION: 35 %
EOSINOPHIL # BLD AUTO: 0.3 K/UL (ref 0–0.5)
EOSINOPHIL NFR BLD: 1.2 % (ref 0–8)
ERYTHROCYTE [DISTWIDTH] IN BLOOD BY AUTOMATED COUNT: 17.2 % (ref 11.5–14.5)
EST. GFR  (NO RACE VARIABLE): >60 ML/MIN/1.73 M^2
FRACTIONAL SHORTENING: 17 % (ref 28–44)
FUNGITELL COMMENTS: NEGATIVE
GALACTOMANNAN AG SPEC-ACNC: <0.5 INDEX
GLUCOSE SERPL-MCNC: 127 MG/DL (ref 70–110)
HCT VFR BLD AUTO: 25.7 % (ref 37–48.5)
HGB BLD-MCNC: 8 G/DL (ref 12–16)
IMM GRANULOCYTES # BLD AUTO: 0.28 K/UL (ref 0–0.04)
IMM GRANULOCYTES NFR BLD AUTO: 1.2 % (ref 0–0.5)
INR PPP: 1.2 (ref 0.8–1.2)
INTERVENTRICULAR SEPTUM: 0.96 CM (ref 0.6–1.1)
IVRT: 72.31 MSEC
LA MAJOR: 5.15 CM
LA MINOR: 5.17 CM
LA WIDTH: 4.07 CM
LEFT ATRIUM SIZE: 4.11 CM
LEFT ATRIUM VOLUME INDEX MOD: 20.9 ML/M2
LEFT ATRIUM VOLUME INDEX: 35.8 ML/M2
LEFT ATRIUM VOLUME MOD: 42.82 CM3
LEFT ATRIUM VOLUME: 73.37 CM3
LEFT INTERNAL DIMENSION IN SYSTOLE: 4.16 CM (ref 2.1–4)
LEFT VENTRICLE DIASTOLIC VOLUME INDEX: 58.46 ML/M2
LEFT VENTRICLE DIASTOLIC VOLUME: 119.85 ML
LEFT VENTRICLE MASS INDEX: 77 G/M2
LEFT VENTRICLE SYSTOLIC VOLUME INDEX: 37.5 ML/M2
LEFT VENTRICLE SYSTOLIC VOLUME: 76.95 ML
LEFT VENTRICULAR INTERNAL DIMENSION IN DIASTOLE: 5.03 CM (ref 3.5–6)
LEFT VENTRICULAR MASS: 157.48 G
LV LATERAL E/E' RATIO: 12 M/S
LV SEPTAL E/E' RATIO: 13.33 M/S
LYMPHOCYTES # BLD AUTO: 1.8 K/UL (ref 1–4.8)
LYMPHOCYTES NFR BLD: 8 % (ref 18–48)
MAGNESIUM SERPL-MCNC: 1.7 MG/DL (ref 1.6–2.6)
MCH RBC QN AUTO: 28.2 PG (ref 27–31)
MCHC RBC AUTO-ENTMCNC: 31.1 G/DL (ref 32–36)
MCV RBC AUTO: 91 FL (ref 82–98)
MONOCYTES # BLD AUTO: 1.5 K/UL (ref 0.3–1)
MONOCYTES NFR BLD: 6.7 % (ref 4–15)
MV PEAK E VEL: 1.2 M/S
NEUTROPHILS # BLD AUTO: 18.7 K/UL (ref 1.8–7.7)
NEUTROPHILS NFR BLD: 82.7 % (ref 38–73)
NRBC BLD-RTO: 0 /100 WBC
NUM UNITS TRANS PACKED RBC: NORMAL
PHOSPHATE SERPL-MCNC: 3.4 MG/DL (ref 2.7–4.5)
PISA TR MAX VEL: 3.22 M/S
PLATELET # BLD AUTO: 538 K/UL (ref 150–450)
PMV BLD AUTO: 8.8 FL (ref 9.2–12.9)
POTASSIUM SERPL-SCNC: 4 MMOL/L (ref 3.5–5.1)
PROT SERPL-MCNC: 6 G/DL (ref 6–8.4)
PROTHROMBIN TIME: 12.1 SEC (ref 9–12.5)
RA MAJOR: 4.35 CM
RA PRESSURE: 15 MMHG
RA WIDTH: 4.09 CM
RBC # BLD AUTO: 2.84 M/UL (ref 4–5.4)
RIGHT VENTRICULAR END-DIASTOLIC DIMENSION: 4.1 CM
RV TISSUE DOPPLER FREE WALL SYSTOLIC VELOCITY 1 (APICAL 4 CHAMBER VIEW): 8.13 CM/S
SINUS: 2.8 CM
SODIUM SERPL-SCNC: 137 MMOL/L (ref 136–145)
STJ: 2.36 CM
TDI LATERAL: 0.1 M/S
TDI SEPTAL: 0.09 M/S
TDI: 0.1 M/S
TR MAX PG: 41 MMHG
TRICUSPID ANNULAR PLANE SYSTOLIC EXCURSION: 0.84 CM
TV REST PULMONARY ARTERY PRESSURE: 56 MMHG
WBC # BLD AUTO: 22.69 K/UL (ref 3.9–12.7)

## 2023-02-10 PROCEDURE — 63600175 PHARM REV CODE 636 W HCPCS: Performed by: STUDENT IN AN ORGANIZED HEALTH CARE EDUCATION/TRAINING PROGRAM

## 2023-02-10 PROCEDURE — 25000003 PHARM REV CODE 250: Performed by: STUDENT IN AN ORGANIZED HEALTH CARE EDUCATION/TRAINING PROGRAM

## 2023-02-10 PROCEDURE — 99900035 HC TECH TIME PER 15 MIN (STAT)

## 2023-02-10 PROCEDURE — 99233 PR SUBSEQUENT HOSPITAL CARE,LEVL III: ICD-10-PCS | Mod: ,,, | Performed by: STUDENT IN AN ORGANIZED HEALTH CARE EDUCATION/TRAINING PROGRAM

## 2023-02-10 PROCEDURE — 27000221 HC OXYGEN, UP TO 24 HOURS

## 2023-02-10 PROCEDURE — 85025 COMPLETE CBC W/AUTO DIFF WBC: CPT

## 2023-02-10 PROCEDURE — 20600001 HC STEP DOWN PRIVATE ROOM

## 2023-02-10 PROCEDURE — 36430 TRANSFUSION BLD/BLD COMPNT: CPT

## 2023-02-10 PROCEDURE — 99233 SBSQ HOSP IP/OBS HIGH 50: CPT | Mod: ,,, | Performed by: STUDENT IN AN ORGANIZED HEALTH CARE EDUCATION/TRAINING PROGRAM

## 2023-02-10 PROCEDURE — 94761 N-INVAS EAR/PLS OXIMETRY MLT: CPT

## 2023-02-10 PROCEDURE — 80053 COMPREHEN METABOLIC PANEL: CPT

## 2023-02-10 PROCEDURE — 25000003 PHARM REV CODE 250: Performed by: NURSE PRACTITIONER

## 2023-02-10 PROCEDURE — 83735 ASSAY OF MAGNESIUM: CPT

## 2023-02-10 PROCEDURE — P9016 RBC LEUKOCYTES REDUCED: HCPCS | Performed by: STUDENT IN AN ORGANIZED HEALTH CARE EDUCATION/TRAINING PROGRAM

## 2023-02-10 PROCEDURE — 84100 ASSAY OF PHOSPHORUS: CPT

## 2023-02-10 PROCEDURE — 94640 AIRWAY INHALATION TREATMENT: CPT

## 2023-02-10 PROCEDURE — 63600175 PHARM REV CODE 636 W HCPCS: Mod: JG | Performed by: HOSPITALIST

## 2023-02-10 PROCEDURE — 25000003 PHARM REV CODE 250: Performed by: INTERNAL MEDICINE

## 2023-02-10 PROCEDURE — 63600175 PHARM REV CODE 636 W HCPCS: Performed by: INTERNAL MEDICINE

## 2023-02-10 PROCEDURE — 25000242 PHARM REV CODE 250 ALT 637 W/ HCPCS: Performed by: STUDENT IN AN ORGANIZED HEALTH CARE EDUCATION/TRAINING PROGRAM

## 2023-02-10 PROCEDURE — 85730 THROMBOPLASTIN TIME PARTIAL: CPT

## 2023-02-10 PROCEDURE — A4216 STERILE WATER/SALINE, 10 ML: HCPCS | Performed by: NURSE PRACTITIONER

## 2023-02-10 PROCEDURE — 25000003 PHARM REV CODE 250

## 2023-02-10 PROCEDURE — 85610 PROTHROMBIN TIME: CPT

## 2023-02-10 RX ORDER — FUROSEMIDE 10 MG/ML
40 INJECTION INTRAMUSCULAR; INTRAVENOUS ONCE
Status: COMPLETED | OUTPATIENT
Start: 2023-02-10 | End: 2023-02-10

## 2023-02-10 RX ORDER — METOPROLOL TARTRATE 25 MG/1
25 TABLET, FILM COATED ORAL 3 TIMES DAILY
Status: DISCONTINUED | OUTPATIENT
Start: 2023-02-10 | End: 2023-02-11

## 2023-02-10 RX ORDER — MAGNESIUM SULFATE HEPTAHYDRATE 40 MG/ML
2 INJECTION, SOLUTION INTRAVENOUS ONCE
Status: COMPLETED | OUTPATIENT
Start: 2023-02-10 | End: 2023-02-10

## 2023-02-10 RX ADMIN — TIZANIDINE 4 MG: 2 TABLET ORAL at 08:02

## 2023-02-10 RX ADMIN — Medication 10 ML: at 12:02

## 2023-02-10 RX ADMIN — PROMETHAZINE HYDROCHLORIDE AND CODEINE PHOSPHATE 5 ML: 6.25; 1 SOLUTION ORAL at 06:02

## 2023-02-10 RX ADMIN — METOPROLOL TARTRATE 25 MG: 25 TABLET, FILM COATED ORAL at 08:02

## 2023-02-10 RX ADMIN — PROMETHAZINE HYDROCHLORIDE AND CODEINE PHOSPHATE 5 ML: 6.25; 1 SOLUTION ORAL at 03:02

## 2023-02-10 RX ADMIN — PROMETHAZINE HYDROCHLORIDE AND CODEINE PHOSPHATE 5 ML: 6.25; 1 SOLUTION ORAL at 11:02

## 2023-02-10 RX ADMIN — DULOXETINE 60 MG: 60 CAPSULE, DELAYED RELEASE ORAL at 08:02

## 2023-02-10 RX ADMIN — METOPROLOL TARTRATE 25 MG: 25 TABLET, FILM COATED ORAL at 03:02

## 2023-02-10 RX ADMIN — GABAPENTIN 400 MG: 400 CAPSULE ORAL at 03:02

## 2023-02-10 RX ADMIN — VANCOMYCIN HYDROCHLORIDE 1500 MG: 1.5 INJECTION, POWDER, LYOPHILIZED, FOR SOLUTION INTRAVENOUS at 06:02

## 2023-02-10 RX ADMIN — Medication 10 ML: at 06:02

## 2023-02-10 RX ADMIN — Medication 9 MG: at 08:02

## 2023-02-10 RX ADMIN — LEVOTHYROXINE SODIUM 50 MCG: 50 TABLET ORAL at 06:02

## 2023-02-10 RX ADMIN — ATORVASTATIN CALCIUM 20 MG: 20 TABLET, FILM COATED ORAL at 08:02

## 2023-02-10 RX ADMIN — GABAPENTIN 400 MG: 400 CAPSULE ORAL at 08:02

## 2023-02-10 RX ADMIN — IPRATROPIUM BROMIDE AND ALBUTEROL SULFATE 3 ML: .5; 3 SOLUTION RESPIRATORY (INHALATION) at 11:02

## 2023-02-10 RX ADMIN — AMITRIPTYLINE HYDROCHLORIDE 75 MG: 50 TABLET, FILM COATED ORAL at 08:02

## 2023-02-10 RX ADMIN — BUPROPION HYDROCHLORIDE 300 MG: 150 TABLET, FILM COATED, EXTENDED RELEASE ORAL at 08:02

## 2023-02-10 RX ADMIN — MUPIROCIN: 20 OINTMENT TOPICAL at 09:02

## 2023-02-10 RX ADMIN — TIZANIDINE 4 MG: 2 TABLET ORAL at 06:02

## 2023-02-10 RX ADMIN — FUROSEMIDE 40 MG: 10 INJECTION, SOLUTION INTRAMUSCULAR; INTRAVENOUS at 03:02

## 2023-02-10 RX ADMIN — ALTEPLASE 2 MG: 2.2 INJECTION, POWDER, LYOPHILIZED, FOR SOLUTION INTRAVENOUS at 06:02

## 2023-02-10 RX ADMIN — MAGNESIUM SULFATE 2 G: 2 INJECTION INTRAVENOUS at 03:02

## 2023-02-10 RX ADMIN — LEVOFLOXACIN 750 MG: 750 TABLET, FILM COATED ORAL at 08:02

## 2023-02-10 NOTE — CARE UPDATE
RAPID RESPONSE NURSE PROACTIVE ROUNDING NOTE       Time of Visit:     Admit Date: 2023  LOS: 3  Code Status: Full Code   Date of Visit: 2023  : 1970  Age: 52 y.o.  Sex: female  Race: White  Bed: 8099/8099 A:   MRN: [id number]  Was the patient discharged from an ICU this admission? Yes   Was the patient discharged from a PACU within last 24 hours? No   Did the patient receive conscious sedation/general anesthesia in last 24 hours? No  Was the patient in the ED within the past 24 hours? No  Was the patient on NIPPV within the past 24 hours? No   Attending Physician: Peggy Hobbs MD  Primary Service: [facility]   Time spent at the bedside: < 15 min    SITUATION    Notified by [facility] patient alert.  Reason for alert: hypotension and tachycardia  Called to evaluate the patient for Dysrythmia    BACKGROUND     Why is the patient in the hospital?: Hemoptysis    Patient has a past medical history of Hypertension, Mixed hyperlipidemia, and Thyroid disease.    Last Vitals:  Temp: 98.3 °F (36.8 °C) (1458)  Pulse: 132 (1700)  Resp: 16 (2002)  BP: 99/68 (1458)  SpO2: 98 % (1700)    24 Hours Vitals Range:  Temp:  [98.3 °F (36.8 °C)-98.8 °F (37.1 °C)]   Pulse:  []   Resp:  [16-35]   BP: ()/(56-68)   SpO2:  [93 %-98 %]     Labs:  Recent Labs     23  0939   WBC 32.21* 24.72*  --  23.71*   HGB 6.7* 7.2* 7.1* 7.6*   HCT 21.2* 23.4* 22.6* 24.4*   * 651*  --  622*       Recent Labs     23  0939    138 138 136   K 4.7 4.3 4.0 3.9   CL 99 99 97 97   CO2 24 26 29 28   CREATININE 0.8 0.7 0.7 0.6   * 118* 168* 128*   PHOS 6.4* 4.5  --  3.2   MG 1.5* 1.9  --  1.7        No results for input(s): PH, PCO2, PO2, HCO3, POCSATURATED, BE in the last 72 hours.     ASSESSMENT    Physical Exam  Vitals and nursing note reviewed.   Constitutional:       General: She  is awake. She is not in acute distress.  Cardiovascular:      Rate and Rhythm: Tachycardia present. Rhythm irregular.   Pulmonary:      Effort: Pulmonary effort is normal.   Neurological:      General: No focal deficit present.      Mental Status: She is alert and oriented to person, place, and time.   Psychiatric:         Mood and Affect: Mood normal.         Behavior: Behavior is cooperative.     Patient with new onset hypotension tonight, SBP 70s. Patient  asymptomatic. Patient also with HR 120s-130s sustaining all day, primary team aware and EP is following.     INTERVENTIONS      1L LR bolus  Continue continuous telemetry   Lactic Acid  Repeat CBC    RECOMMENDATIONS    Consider transfusing PRBC    PROVIDER ESCALATION    Yes/No  yes    Orders received and case discussed with Dr. Kvng Hoffman .    Disposition: Remain in room 8099.    FOLLOW-UP    Bedside RNLinda and Charge RN Roberta  updated on plan of care. Instructed to call the Rapid Response Nurse, Jose Seaman RN at [phone] for additional questions or concerns.

## 2023-02-10 NOTE — SUBJECTIVE & OBJECTIVE
Interval History: Received 1 u blood yesterday with improvement in Hb. Denies further hemoptysis. SOB improved. Weaning oxygen. Afebrile  HR persistently elevated aflutter, increasing metoprolol.  Denies chest pain, denies palpitations.     Review of Systems  Objective:     Vital Signs (Most Recent):  Temp: 98.4 °F (36.9 °C) (02/10/23 1116)  Pulse: (!) 138 (02/10/23 1501)  Resp: 18 (02/10/23 1156)  BP: 102/78 (02/10/23 1116)  SpO2: 98 % (02/10/23 1501)   Vital Signs (24h Range):  Temp:  [98 °F (36.7 °C)-98.8 °F (37.1 °C)] 98.4 °F (36.9 °C)  Pulse:  [101-139] 138  Resp:  [16-33] 18  SpO2:  [93 %-100 %] 98 %  BP: ()/(55-78) 102/78     Weight: 98.9 kg (218 lb)  Body mass index is 36.28 kg/m².    Intake/Output Summary (Last 24 hours) at 2/10/2023 1514  Last data filed at 2/10/2023 0620  Gross per 24 hour   Intake 1200 ml   Output 1980 ml   Net -780 ml      Physical Exam  Constitutional:       General: She is not in acute distress.     Appearance: She is obese. She is not ill-appearing or diaphoretic.   HENT:      Nose:      Comments: Nasal cannula in place, no obvious bleeding in nares or mouth     Mouth/Throat:      Pharynx: Oropharynx is clear. No oropharyngeal exudate or posterior oropharyngeal erythema.      Comments: No obvious bleeding in mouth or throat  Cardiovascular:      Rate and Rhythm: Regular rhythm. Tachycardia present.      Heart sounds: Normal heart sounds. No murmur heard.  Pulmonary:      Effort: Pulmonary effort is normal. No respiratory distress.      Breath sounds: Rhonchi and rales present. No wheezing.   Abdominal:      General: Abdomen is flat. There is no distension.      Palpations: Abdomen is soft. There is no mass.      Tenderness: There is no abdominal tenderness. There is no guarding or rebound.   Musculoskeletal:         General: Normal range of motion.      Right lower leg: No edema.      Left lower leg: No edema.   Skin:     General: Skin is warm.      Coloration: Skin is not  jaundiced.      Findings: No bruising.   Neurological:      Mental Status: She is alert and oriented to person, place, and time.       MELD-Na score: 9 at 2/10/2023  6:37 AM  MELD score: 9 at 2/10/2023  6:37 AM  Calculated from:  Serum Creatinine: 0.6 mg/dL (Using min of 1 mg/dL) at 2/10/2023  6:37 AM  Serum Sodium: 137 mmol/L at 2/10/2023  6:37 AM  Total Bilirubin: 1.2 mg/dL at 2/10/2023  6:37 AM  INR(ratio): 1.2 at 2/10/2023  6:37 AM  Age: 52 years    Significant Labs:  CBC:  Recent Labs   Lab 02/09/23  0939 02/09/23  2201 02/10/23  0637   WBC 23.71* 21.04* 22.69*   HGB 7.6* 6.8* 8.0*   HCT 24.4* 22.4* 25.7*   * 552* 538*     CMP:  Recent Labs   Lab 02/08/23 2032 02/09/23 0939 02/10/23  0637    136 137   K 4.0 3.9 4.0   CL 97 97 96   CO2 29 28 29   * 128* 127*   BUN 18 15 13   CREATININE 0.7 0.6 0.6   CALCIUM 9.2 9.2 9.1   PROT  --  6.3 6.0   ALBUMIN  --  1.4* 1.4*   BILITOT  --  0.4 1.2*   ALKPHOS  --  229* 193*   AST  --  49* 55*   ALT  --  39 49*   ANIONGAP 12 11 12     PTINR:  Recent Labs   Lab 02/09/23  0939 02/10/23  0637   INR 1.2 1.2       Significant Procedures:   Dobutamine Stress Test with Color Flow: No results found for this or any previous visit.

## 2023-02-10 NOTE — ASSESSMENT & PLAN NOTE
On 02/09, HR noted in 130s to 150s.  Patient remained asymptomatic, denied palpitations, chest pain, shortness of breath, lightheadedness or dizziness.  EKG obtained, consistent with a flutter with RVR.  Consulted Cardiology, recommend consulting EP.  Started metoprolol 12.5 b.i.d, uptitrating.. Holding anticoagulation given hemoptysis.

## 2023-02-10 NOTE — NURSING
End of shift note    AAOx4  7L HF NC  ST- cards consulted  EKG  VSS  NADN- safety checks performed  Call light in reach

## 2023-02-10 NOTE — PROGRESS NOTES
"[facility] - Telemetry [facility] Medicine  Progress Note    Patient Name: Skyla Ernandez  MRN: [id number]  Patient Class: IP- Inpatient   Admission Date: 2/6/2023  Length of Stay: 4 days  Attending Physician: Tyrell Polanco*  Primary Care Provider: Primary Doctor No        Subjective:     Principal Problem:Hemoptysis        HPI:  Per ICU hand off : " 51 yo female with hypertension, hyperlipidemia, and thyroid disease who presented to [facility] Emergency Department this morning for hemoptysis. She reports 3 weeks of cough in which she first noticed blood tinged sputum 2 weeks ago. She reports that the cough has been productive of whitish brown sputum which became darker and eventually appeared dark red. The cough often interferes with sleep. She has also experienced shortness of breath over the past couple of days. She endorses sore throat, RUSSELL, and 2-3 pillow orthopnea. This morning she coughed up over a cup of bright red blood and decided to present to the ED. She denies any chronic tobacco history apart from occasional smoking cigarettes in her late teens. She denies piror homelessness, incarceration, close contacts with TB, or recent travel. She denies having a history of asthma, COPD, cirrhosis or nose bleeds but does report history of scoliosis.     Workup at the [facility] ED includes leukocytosis to 35K, hemoglobin of 6.8 now s/p 1 unit of pRBC's, thrombocytosis to 882, NT-proBNP 1640, troponin negative, and lactic acid 1.8. CXR diffuse patchy opacities scattered throughout the right middle and right lower lung zone which may be seen with pneumonia, aspiration, or edema. CTA of the chest showed no evidence of pulmonary embolism. Solid mass in the superior aspect of the right lung concerning for malignancy. Mass is partially necrotic with air bubbles peripherally. Mass abuts the pericardium and mediastinum. There is associated atelectasis/consolidation of the right middle " "lobe. Patchy opacities in the right lower lobe as well as the left lung which is nonspecific but may represent atelectasis, infectious process, or hemorrhage. She was started on levofloxacin. She was noted to be tachycardic to 100's with soft pressures 87//73 and stable oxygenation on 2LNC.     Patient transferred to [facility] medical intensive care unit for higher level of care."      Overview/Hospital Course:  Per ICU hand off: Patient admitted to MICU for further evaluation and management of hemoptysis. Hb of 6.7, received a second unit of pRBC's as patient continued to have cough with blood tinged sputum. On levofloxacin and vancomycin given leukocytosis, fever, and concerned for pneumonia. Patient remains HDS and satting well on 4L NC. Hypoxic respiratory failure likely 2/2 pneumonia vs hemoptysis vs lung mass. S/p bronchoscopy on 2/7 with BAL performed which was significant for grossly normal airways apart from sticky thick white mucus. Results from BAL unremarkable so far and cultures NGTD. Patient continues to be HDS and satting well on LFNC. Hb stable without any further hemoptysis for over 24 hours. Patient is medically ready for step down to hospital medicine.  2/9: Overnight events of hemoptysis noted.  Patient reports  soreness in lateral chest wall muscles from cough.  Heart rate noted in 130s to 150s.  EKG consistent with a flutter.  Starting metoprolol 12.5 b.i.d..  Holding Eliquis in the setting of hemoptysis. Consulted EP      Interval History: Received 1 u blood yesterday with improvement in Hb. Denies further hemoptysis. SOB improved. Weaning oxygen. Afebrile  HR persistently elevated aflutter, increasing metoprolol.  Denies chest pain, denies palpitations.     Review of Systems  Objective:     Vital Signs (Most Recent):  Temp: 98.4 °F (36.9 °C) (02/10/23 1116)  Pulse: (!) 138 (02/10/23 1501)  Resp: 18 (02/10/23 1156)  BP: 102/78 (02/10/23 1116)  SpO2: 98 % (02/10/23 1501)   Vital Signs (24h " Range):  Temp:  [98 °F (36.7 °C)-98.8 °F (37.1 °C)] 98.4 °F (36.9 °C)  Pulse:  [101-139] 138  Resp:  [16-33] 18  SpO2:  [93 %-100 %] 98 %  BP: ()/(55-78) 102/78     Weight: 98.9 kg (218 lb)  Body mass index is 36.28 kg/m².    Intake/Output Summary (Last 24 hours) at 2/10/2023 1514  Last data filed at 2/10/2023 0620  Gross per 24 hour   Intake 1200 ml   Output 1980 ml   Net -780 ml      Physical Exam  Constitutional:       General: She is not in acute distress.     Appearance: She is obese. She is not ill-appearing or diaphoretic.   HENT:      Nose:      Comments: Nasal cannula in place, no obvious bleeding in nares or mouth     Mouth/Throat:      Pharynx: Oropharynx is clear. No oropharyngeal exudate or posterior oropharyngeal erythema.      Comments: No obvious bleeding in mouth or throat  Cardiovascular:      Rate and Rhythm: Regular rhythm. Tachycardia present.      Heart sounds: Normal heart sounds. No murmur heard.  Pulmonary:      Effort: Pulmonary effort is normal. No respiratory distress.      Breath sounds: Rhonchi and rales present. No wheezing.   Abdominal:      General: Abdomen is flat. There is no distension.      Palpations: Abdomen is soft. There is no mass.      Tenderness: There is no abdominal tenderness. There is no guarding or rebound.   Musculoskeletal:         General: Normal range of motion.      Right lower leg: No edema.      Left lower leg: No edema.   Skin:     General: Skin is warm.      Coloration: Skin is not jaundiced.      Findings: No bruising.   Neurological:      Mental Status: She is alert and oriented to person, place, and time.       MELD-Na score: 9 at 2/10/2023  6:37 AM  MELD score: 9 at 2/10/2023  6:37 AM  Calculated from:  Serum Creatinine: 0.6 mg/dL (Using min of 1 mg/dL) at 2/10/2023  6:37 AM  Serum Sodium: 137 mmol/L at 2/10/2023  6:37 AM  Total Bilirubin: 1.2 mg/dL at 2/10/2023  6:37 AM  INR(ratio): 1.2 at 2/10/2023  6:37 AM  Age: 52 years    Significant  Labs:  CBC:  Recent Labs   Lab 02/09/23  0939 02/09/23  2201 02/10/23  0637   WBC 23.71* 21.04* 22.69*   HGB 7.6* 6.8* 8.0*   HCT 24.4* 22.4* 25.7*   * 552* 538*     CMP:  Recent Labs   Lab 02/08/23  2032 02/09/23  0939 02/10/23  0637    136 137   K 4.0 3.9 4.0   CL 97 97 96   CO2 29 28 29   * 128* 127*   BUN 18 15 13   CREATININE 0.7 0.6 0.6   CALCIUM 9.2 9.2 9.1   PROT  --  6.3 6.0   ALBUMIN  --  1.4* 1.4*   BILITOT  --  0.4 1.2*   ALKPHOS  --  229* 193*   AST  --  49* 55*   ALT  --  39 49*   ANIONGAP 12 11 12     PTINR:  Recent Labs   Lab 02/09/23  0939 02/10/23  0637   INR 1.2 1.2       Significant Procedures:   Dobutamine Stress Test with Color Flow: No results found for this or any previous visit.      Assessment/Plan:      * Hemoptysis  Patient with cough for 3 weeks and coughed up a cup of blood prior to presentation  Not on blood thinners, non-tobacco smoker, heavy marijuana smoker, no lung disease history   CTA at outside hospital negative for PE but there was a solid mass in the superior aspect of the right lung concerning for malignancy.  Mass is partially necrotic with air bubbles peripherally.    At outside hospital Hg 6.8, was given 1 unit pRBC   Was also given nebulized TXA at outside hospital   2/8: Hemoglobin remaining stable with resolution of hemoptysis    - Discontinue nebulized TXA  - Monitor oxygen   - s/p bronch on 2/7, see procedure note  - Consult IR for possible emoblization if uncontrolled bleeding   - prn codeine-promethazine for cough  - Trend CBC   - monitor coags      Acute systolic heart failure  Results for orders placed during the hospital encounter of 02/06/23  On losartan, BB  Trial Lasix    Echo    Interpretation Summary  · Atrial fibrillation ( or Flutter?) observed.  · The left ventricle is mildly enlarged with moderately decreased systolic function.  · The estimated ejection fraction is 35%.  · There is abnormal septal wall motion.  · Mild left atrial  enlargement.  · Mild right ventricular enlargement with moderately to severely reduced right ventricular systolic function.  · Elevated central venous pressure (15 mmHg).  · Mild to moderate tricuspid regurgitation.  · Mild pulmonic regurgitation.  · The estimated PA systolic pressure is 56 mmHg.  · There is moderate pulmonary hypertension.  · Small posterior pericardial effusion. Trivial under the RA.        Atrial flutter  On 02/09, HR noted in 130s to 150s.  Patient remained asymptomatic, denied palpitations, chest pain, shortness of breath, lightheadedness or dizziness.  EKG obtained, consistent with a flutter with RVR.  Consulted Cardiology, recommend consulting EP.  Started metoprolol 12.5 b.i.d, uptitrating.. Holding anticoagulation given hemoptysis.      Anxiety and depression  - continue home bupropion, amitriptyline, clonazepam, and duloxetine.        Neuropathy  - home gabapentin, amitriptyline, duloxetine       Acute hypoxemic respiratory failure  Likely due to hemoptysis, lung mass, and possible pneumonia      - Vanc/levofloxacin   - CBC daily   - Supplemental O2 as needed     Thyroid disease  Continue home synthroid       Anemia  Hg 6.8 at outside hospital, was given 1 unit pRBC   Here with hemoptysis   Repeat Hb noted at 6.7, received a second unit of pRBC's  2/8: hemoptysis resolved and hemoglobin stable     - CBC daily   - FU coags   - Transfuse if Hg <7, platelets <10, or platelets <50 and actively bleeding       Mass of upper lobe of right lung  CTA at outside hospital negative for PE but there was a solid mass in the superior aspect of the right lung concerning for malignancy.  Mass is partially necrotic with air bubbles peripherally.    WBC 35 and lacate normal at outside hospital      - Vanc/levofloxacin   - FU cultures   - s/p bronch on 2/7, no mass visualized in airways to biopsy but BAL performed with results pending  - consider inpatient IR consult to biopsy mass         VTE Risk Mitigation  (From admission, onward)         Ordered     Reason for No Pharmacological VTE Prophylaxis  Once        Question:  Reasons:  Answer:  Active Bleeding    02/06/23 1846     IP VTE HIGH RISK PATIENT  Once         02/06/23 1846     Place sequential compression device  Until discontinued         02/06/23 1846                Discharge Planning   MICHELE: 2/13/2023     Code Status: Full Code   Is the patient medically ready for discharge?:     Reason for patient still in hospital (select all that apply): Patient trending condition, Treatment and Consult recommendations  Discharge Plan A: Home with family        Tyrell Young MD  Department of Hospital Medicine   [facility] - Telemetry Stepdown

## 2023-02-10 NOTE — CARE UPDATE
RAPID RESPONSE NURSE FOLLOW-UP NOTE       Followed up with patient for proactive rounding.  No acute issues at this time. Reviewed plan of care with charge Jeanette MCKEE .   Team will continue to follow.  Please call Rapid Response RN, Arnav Oliveros RN with any questions or concerns at [phone].    RAPID RESPONSE NURSE ROUND       Rounding completed with charge Jeanette MCKEE for a-flutter reports patient still in a-flutter still closely monitoring.Blood Pressure was low but normal this morning No additional concerns verbalized at this time. Instructed to call [phone] for further concerns or assistance.

## 2023-02-10 NOTE — ASSESSMENT & PLAN NOTE
Results for orders placed during the hospital encounter of 02/06/23  On losartan, BB  Trial Lasix    Echo    Interpretation Summary  · Atrial fibrillation ( or Flutter?) observed.  · The left ventricle is mildly enlarged with moderately decreased systolic function.  · The estimated ejection fraction is 35%.  · There is abnormal septal wall motion.  · Mild left atrial enlargement.  · Mild right ventricular enlargement with moderately to severely reduced right ventricular systolic function.  · Elevated central venous pressure (15 mmHg).  · Mild to moderate tricuspid regurgitation.  · Mild pulmonic regurgitation.  · The estimated PA systolic pressure is 56 mmHg.  · There is moderate pulmonary hypertension.  · Small posterior pericardial effusion. Trivial under the RA.

## 2023-02-10 NOTE — RESPIRATORY THERAPY
RAPID RESPONSE RESPIRATORY THERAPY PROACTIVE ROUNDING NOTE             Time of visit: 936     Code Status: Full Code   : 1970  Bed: 8099/8099 A:   MRN: [id number]  Time spent at the bedside: < 15 min    SITUATION    Evaluated patient for: HFNC Compliance     BACKGROUND    Patient has a past medical history of Hypertension, Mixed hyperlipidemia, and Thyroid disease.    24 Hours Vitals Range:  Temp:  [98 °F (36.7 °C)-98.8 °F (37.1 °C)]   Pulse:  [101-139]   Resp:  [16-33]   BP: ()/(55-78)   SpO2:  [93 %-100 %]     Labs:    Recent Labs     23  03323  0939 02/10/23  0637    138 136 137   K 4.3 4.0 3.9 4.0   CL 99 97 97 96   CO2 26 29 28 29   CREATININE 0.7 0.7 0.6 0.6   * 168* 128* 127*   PHOS 4.5  --  3.2 3.4   MG 1.9  --  1.7 1.7        No results for input(s): PH, PCO2, PO2, HCO3, POCSATURATED, BE in the last 72 hours.    ASSESSMENT/INTERVENTIONS    Pt resting comfortably in bed with no respiratory needs at this time    Last VS   Temp: 98.4 °F (36.9 °C) (02/10 1116)  Pulse: 131 (02/10 111)  Resp: 18 (02/10 115)  BP: 102/78 (02/10 1116)  SpO2: 96 % (02/10 1116)    Level of Consciousness: Level of Consciousness (AVPU): alert  Respiratory Effort: Respiratory Effort: Unlabored Expansion/Accessory Muscle Usage: Expansion/Accessory Muscles/Retractions: expansion symmetric  All Lung Field Breath Sounds: All Lung Fields Breath Sounds: Anterior:, Lateral:, diminished  NIESHA Breath Sounds: wheezes, expiratory  LLL Breath Sounds: wheezes, expiratory  RUL Breath Sounds: wheezes, expiratory  RML Breath Sounds: wheezes, expiratory  RLL Breath Sounds: wheezes, expiratory  O2 Device/Concentration: N.C. 4LPM  Was the O2 device able to be weaned? No  Ambu at bedside: Ambu bag with the patient?: Yes, Adult Ambu    Active Orders   Respiratory Care    Inhalation Treatment Q4H PRN     Frequency: Q4H PRN     Number of Occurrences: Until Specified    Oxygen Continuous     Frequency:  Continuous     Number of Occurrences: Until Specified     Order Questions:      Device type: High flow      Device: High Flow Nasal Cannula (6 -15 Liters)      LPM: 7      Titrate O2 per Oxygen Titration Protocol: Yes      To maintain SpO2 goal of: >= 92%      Notify MD of: Inability to achieve desired SpO2; Sudden change in patient status and requires 20% increase in FiO2; Patient requires >60% FiO2    POCT ARTERIAL BLOOD GAS Blood Gas     Frequency: Once     Number of Occurrences: 1 Occurrences     Order Comments: Notify Physician if: see parameters below.       Order Questions:      Component: Blood Gas       RECOMMENDATIONS    We recommend: RRT Recs: Continue POC per primary team.      FOLLOW-UP    Please call back the Rapid Response RTJose Angel RRT at x [phone] for any questions or concerns.

## 2023-02-10 NOTE — PLAN OF CARE
[facility] - Telemetry Stepdown  Discharge Reassessment    Primary Care Provider: Primary Doctor No    Expected Discharge Date: 2/13/2023    Reassessment (most recent)       Discharge Reassessment - 02/10/23 1218          Discharge Reassessment    Assessment Type Discharge Planning Reassessment     Did the patient's condition or plan change since previous assessment? No     Discharge Plan discussed with: Patient;Spouse/sig other     Communicated MICHELE with patient/caregiver Yes     Discharge Plan A Home with family     Discharge Plan B Home Health     DME Needed Upon Discharge  other (see comments)   TBD    Discharge Barriers Identified Other (see comments)   Patient lives in [place].    Why the patient remains in the hospital Requires continued medical care                     Dominique Barriga RN  Ext [phone]

## 2023-02-10 NOTE — PROGRESS NOTES
Pharmacokinetic Assessment Follow Up: IV Vancomycin    Vancomycin serum concentration assessment and plan:  Vancomycin trough resulted at 12.8 mcg/ml which was drawn correctly  Vancomycin dose this AM given ~ 5 hours late so true trough likely higher  Previous target goal 15 - 20 mcg/ml but okay with expending this to 10 - 20 mcg/ml due to low likelihood of infection  Cultures NGTD  Continue current regimen of 1500mg IV q12h  Repeat trough 2/13 @ 0600 or sooner if clinically indicated      Drug levels (last 3 results):  Recent Labs   Lab Result Units 02/08/23  0331 02/09/23  1821   Vancomycin-Trough ug/mL 18.7 12.8       Pharmacy will continue to follow and monitor vancomycin.    Please contact pharmacy at extension [phone] for questions regarding this assessment.    Thank you for the consult,   Mira Perkins       Patient brief summary:  Skyla Ernandez is a 52 y.o. female initiated on antimicrobial therapy with IV Vancomycin for treatment of lower respiratory infection    The patient's current regimen is 1500mg IV q12h    Drug Allergies:   Review of patient's allergies indicates:   Allergen Reactions    Penicillins Hives       Actual Body Weight:   99 kg    Renal Function:   Estimated Creatinine Clearance: 124.9 mL/min (based on SCr of 0.6 mg/dL).,     Dialysis Method (if applicable):  N/A    CBC (last 72 hours):  Recent Labs   Lab Result Units 02/06/23  1937 02/07/23  0246 02/08/23  0331 02/08/23  2032 02/09/23  0939   WBC K/uL 38.89* 32.21* 24.72*  --  23.71*   Hemoglobin g/dL 7.9* 6.7* 7.2* 7.1* 7.6*   Hematocrit % 24.4* 21.2* 23.4* 22.6* 24.4*   Platelets K/uL 803* 730* 651*  --  622*   Gran % % 91.5* 80.0* 85.2*  --  86.8*   Lymph % % 3.2* 8.0* 7.0*  --  5.7*   Mono % % 3.3* 4.0 5.7  --  5.0   Eosinophil % % 0.2 0.0 0.6  --  1.3   Basophil % % 0.2 0.0 0.2  --  0.2   Differential Method  Automated Automated Automated  --  Automated       Metabolic Panel (last 72 hours):  Recent Labs   Lab Result Units 02/06/23  3635  02/07/23  0246 02/08/23  0331 02/08/23 2032 02/09/23  0939   Sodium mmol/L  --  138 138 138 136   Potassium mmol/L  --  4.7 4.3 4.0 3.9   Chloride mmol/L  --  99 99 97 97   CO2 mmol/L  --  24 26 29 28   Glucose mg/dL  --  160* 118* 168* 128*   BUN mg/dL  --  23* 19 18 15   Creatinine mg/dL  --  0.8 0.7 0.7 0.6   Albumin g/dL  --  1.5* 1.5*  --  1.4*   Total Bilirubin mg/dL  --  0.7 0.5  --  0.4   Alkaline Phosphatase U/L  --  257* 224*  --  229*   AST U/L  --  15 19  --  49*   ALT U/L  --  24 22  --  39   Magnesium mg/dL 1.5* 1.5* 1.9  --  1.7   Phosphorus mg/dL 4.4 6.4* 4.5  --  3.2       Vancomycin Administrations:  vancomycin given in the last 96 hours                     vancomycin 1,500 mg in dextrose 5 % (D5W) 250 mL IVPB (Vial-Mate) (mg) 1,500 mg New Bag 02/09/23 0702     1,500 mg New Bag 02/08/23 1414     1,500 mg New Bag  0300     1,500 mg New Bag 02/07/23 1508     1,500 mg New Bag  0313    vancomycin 2 g in 0.9% sodium chloride 500 mL IVPB (mg) 2,000 mg New Bag 02/06/23 1513                    Microbiologic Results:  Microbiology Results (last 7 days)       Procedure Component Value Units Date/Time    Culture, Respiratory with Gram Stain [[id number]] Collected: 02/07/23 1759    Order Status: Completed Specimen: Respiratory from Bronchial [place] Updated: 02/09/23 0742     Respiratory Culture Normal respiratory silvia     Gram Stain (Respiratory) <10 epithelial cells per low power field.     Gram Stain (Respiratory) Rare WBC's     Gram Stain (Respiratory) Rare Gram positive cocci    Blood culture [[id number]] Collected: 02/06/23 2050    Order Status: Completed Specimen: Blood from Peripheral, Forearm, Left Updated: 02/08/23 2322     Blood Culture, Routine No Growth to date      No Growth to date      No Growth to date    Blood culture [[id number]] Collected: 02/06/23 2050    Order Status: Completed Specimen: Blood from Peripheral, Forearm, Left Updated: 02/08/23 2322     Blood Culture, Routine No Growth to date       No Growth to date      No Growth to date    AFB Culture & Smear [[id number]] Collected: 02/07/23 1800    Order Status: Completed Specimen: Body Fluid from Lung, RUL Updated: 02/08/23 2127     AFB Culture & Smear Culture in progress     AFB CULTURE STAIN No acid fast bacilli seen.    Narrative:      Bronchial Brush    Fungus culture [[id number]] Collected: 02/07/23 1800    Order Status: Completed Specimen: Body Fluid from Lung, RUL Updated: 02/08/23 1016     Fungus (Mycology) Culture Culture in progress    Narrative:      Bronchial Brush    AFB stain [[id number]] Collected: 02/07/23 1800    Order Status: Completed Specimen: Body Fluid from Lung, RUL Updated: 02/07/23 2242     Direct Acid Fast No acid fast bacilli seen.    Narrative:      Bronchial Brush    KOH prep [[id number]] Collected: 02/07/23 1800    Order Status: Completed Specimen: Body Fluid from Lung, RUL Updated: 02/07/23 2106     KOH Prep No yeast or fungal elements seen    Narrative:      Bronchial Brush    Culture, Respiratory [[id number]] Collected: 02/07/23 1759    Order Status: Completed Specimen: Respiratory from Bronchial [place] Updated: 02/07/23 2057    Narrative:      Bronchial Brush    Gram stain [[id number]] Collected: 02/07/23 1800    Order Status: Canceled Specimen: Body Fluid from Lung, RUL

## 2023-02-11 LAB
ALBUMIN SERPL BCP-MCNC: 1.5 G/DL (ref 3.5–5.2)
ALP SERPL-CCNC: 184 U/L (ref 55–135)
ALT SERPL W/O P-5'-P-CCNC: 75 U/L (ref 10–44)
ANION GAP SERPL CALC-SCNC: 13 MMOL/L (ref 8–16)
APTT BLDCRRT: 28.7 SEC (ref 21–32)
AST SERPL-CCNC: 99 U/L (ref 10–40)
BACTERIA BLD CULT: NORMAL
BACTERIA BLD CULT: NORMAL
BACTERIA SPEC AEROBE CULT: NORMAL
BACTERIA SPEC AEROBE CULT: NORMAL
BASOPHILS # BLD AUTO: 0.08 K/UL (ref 0–0.2)
BASOPHILS NFR BLD: 0.4 % (ref 0–1.9)
BILIRUB SERPL-MCNC: 0.3 MG/DL (ref 0.1–1)
BUN SERPL-MCNC: 17 MG/DL (ref 6–20)
CALCIUM SERPL-MCNC: 9.2 MG/DL (ref 8.7–10.5)
CHLORIDE SERPL-SCNC: 93 MMOL/L (ref 95–110)
CO2 SERPL-SCNC: 31 MMOL/L (ref 23–29)
CREAT SERPL-MCNC: 0.7 MG/DL (ref 0.5–1.4)
DIFFERENTIAL METHOD: ABNORMAL
EOSINOPHIL # BLD AUTO: 0.4 K/UL (ref 0–0.5)
EOSINOPHIL NFR BLD: 2 % (ref 0–8)
ERYTHROCYTE [DISTWIDTH] IN BLOOD BY AUTOMATED COUNT: 17.8 % (ref 11.5–14.5)
EST. GFR  (NO RACE VARIABLE): >60 ML/MIN/1.73 M^2
GLUCOSE SERPL-MCNC: 159 MG/DL (ref 70–110)
GRAM STN SPEC: NORMAL
HCT VFR BLD AUTO: 25.7 % (ref 37–48.5)
HGB BLD-MCNC: 8.2 G/DL (ref 12–16)
IMM GRANULOCYTES # BLD AUTO: 0.44 K/UL (ref 0–0.04)
IMM GRANULOCYTES NFR BLD AUTO: 2.1 % (ref 0–0.5)
INR PPP: 1.2 (ref 0.8–1.2)
LACTATE SERPL-SCNC: 0.8 MMOL/L (ref 0.5–2.2)
LYMPHOCYTES # BLD AUTO: 1.9 K/UL (ref 1–4.8)
LYMPHOCYTES NFR BLD: 9 % (ref 18–48)
MAGNESIUM SERPL-MCNC: 1.8 MG/DL (ref 1.6–2.6)
MCH RBC QN AUTO: 28.5 PG (ref 27–31)
MCHC RBC AUTO-ENTMCNC: 31.9 G/DL (ref 32–36)
MCV RBC AUTO: 89 FL (ref 82–98)
MONOCYTES # BLD AUTO: 1.5 K/UL (ref 0.3–1)
MONOCYTES NFR BLD: 7.4 % (ref 4–15)
NEUTROPHILS # BLD AUTO: 16.4 K/UL (ref 1.8–7.7)
NEUTROPHILS NFR BLD: 79.1 % (ref 38–73)
NRBC BLD-RTO: 0 /100 WBC
PHOSPHATE SERPL-MCNC: 4 MG/DL (ref 2.7–4.5)
PLATELET # BLD AUTO: 525 K/UL (ref 150–450)
PMV BLD AUTO: 8.6 FL (ref 9.2–12.9)
POCT GLUCOSE: 165 MG/DL (ref 70–110)
POTASSIUM SERPL-SCNC: 3.8 MMOL/L (ref 3.5–5.1)
PROT SERPL-MCNC: 6.2 G/DL (ref 6–8.4)
PROTHROMBIN TIME: 12.1 SEC (ref 9–12.5)
RBC # BLD AUTO: 2.88 M/UL (ref 4–5.4)
SODIUM SERPL-SCNC: 137 MMOL/L (ref 136–145)
WBC # BLD AUTO: 20.67 K/UL (ref 3.9–12.7)

## 2023-02-11 PROCEDURE — 99900035 HC TECH TIME PER 15 MIN (STAT)

## 2023-02-11 PROCEDURE — 20600001 HC STEP DOWN PRIVATE ROOM

## 2023-02-11 PROCEDURE — 25000003 PHARM REV CODE 250: Performed by: NURSE PRACTITIONER

## 2023-02-11 PROCEDURE — 25000003 PHARM REV CODE 250: Performed by: STUDENT IN AN ORGANIZED HEALTH CARE EDUCATION/TRAINING PROGRAM

## 2023-02-11 PROCEDURE — 63600175 PHARM REV CODE 636 W HCPCS: Performed by: INTERNAL MEDICINE

## 2023-02-11 PROCEDURE — 83735 ASSAY OF MAGNESIUM: CPT

## 2023-02-11 PROCEDURE — 93010 ELECTROCARDIOGRAM REPORT: CPT | Mod: ,,, | Performed by: INTERNAL MEDICINE

## 2023-02-11 PROCEDURE — 93005 ELECTROCARDIOGRAM TRACING: CPT

## 2023-02-11 PROCEDURE — 80053 COMPREHEN METABOLIC PANEL: CPT

## 2023-02-11 PROCEDURE — 99233 PR SUBSEQUENT HOSPITAL CARE,LEVL III: ICD-10-PCS | Mod: ,,, | Performed by: STUDENT IN AN ORGANIZED HEALTH CARE EDUCATION/TRAINING PROGRAM

## 2023-02-11 PROCEDURE — 25000003 PHARM REV CODE 250

## 2023-02-11 PROCEDURE — 83605 ASSAY OF LACTIC ACID: CPT | Performed by: STUDENT IN AN ORGANIZED HEALTH CARE EDUCATION/TRAINING PROGRAM

## 2023-02-11 PROCEDURE — A4216 STERILE WATER/SALINE, 10 ML: HCPCS | Performed by: NURSE PRACTITIONER

## 2023-02-11 PROCEDURE — 94761 N-INVAS EAR/PLS OXIMETRY MLT: CPT

## 2023-02-11 PROCEDURE — 25000003 PHARM REV CODE 250: Performed by: INTERNAL MEDICINE

## 2023-02-11 PROCEDURE — 85610 PROTHROMBIN TIME: CPT

## 2023-02-11 PROCEDURE — 99233 SBSQ HOSP IP/OBS HIGH 50: CPT | Mod: ,,, | Performed by: STUDENT IN AN ORGANIZED HEALTH CARE EDUCATION/TRAINING PROGRAM

## 2023-02-11 PROCEDURE — 63600175 PHARM REV CODE 636 W HCPCS: Performed by: STUDENT IN AN ORGANIZED HEALTH CARE EDUCATION/TRAINING PROGRAM

## 2023-02-11 PROCEDURE — 93010 EKG 12-LEAD: ICD-10-PCS | Mod: ,,, | Performed by: INTERNAL MEDICINE

## 2023-02-11 PROCEDURE — 27000221 HC OXYGEN, UP TO 24 HOURS

## 2023-02-11 PROCEDURE — 85025 COMPLETE CBC W/AUTO DIFF WBC: CPT

## 2023-02-11 PROCEDURE — 85730 THROMBOPLASTIN TIME PARTIAL: CPT

## 2023-02-11 PROCEDURE — 84100 ASSAY OF PHOSPHORUS: CPT

## 2023-02-11 PROCEDURE — 25000003 PHARM REV CODE 250: Performed by: HOSPITALIST

## 2023-02-11 RX ORDER — DIGOXIN 0.25 MG/ML
500 INJECTION INTRAMUSCULAR; INTRAVENOUS ONCE
Status: COMPLETED | OUTPATIENT
Start: 2023-02-11 | End: 2023-02-11

## 2023-02-11 RX ORDER — DIGOXIN 0.25 MG/ML
250 INJECTION INTRAMUSCULAR; INTRAVENOUS EVERY 6 HOURS
Status: DISCONTINUED | OUTPATIENT
Start: 2023-02-12 | End: 2023-02-12

## 2023-02-11 RX ORDER — METOPROLOL TARTRATE 25 MG/1
25 TABLET, FILM COATED ORAL 4 TIMES DAILY
Status: DISCONTINUED | OUTPATIENT
Start: 2023-02-11 | End: 2023-02-12

## 2023-02-11 RX ORDER — BENZONATATE 100 MG/1
100 CAPSULE ORAL 3 TIMES DAILY PRN
Status: DISCONTINUED | OUTPATIENT
Start: 2023-02-11 | End: 2023-02-17 | Stop reason: HOSPADM

## 2023-02-11 RX ADMIN — PROMETHAZINE HYDROCHLORIDE AND CODEINE PHOSPHATE 5 ML: 6.25; 1 SOLUTION ORAL at 11:02

## 2023-02-11 RX ADMIN — BUPROPION HYDROCHLORIDE 300 MG: 150 TABLET, FILM COATED, EXTENDED RELEASE ORAL at 09:02

## 2023-02-11 RX ADMIN — METOPROLOL TARTRATE 25 MG: 25 TABLET, FILM COATED ORAL at 10:02

## 2023-02-11 RX ADMIN — METOPROLOL TARTRATE 25 MG: 25 TABLET, FILM COATED ORAL at 01:02

## 2023-02-11 RX ADMIN — VANCOMYCIN HYDROCHLORIDE 1500 MG: 1.5 INJECTION, POWDER, LYOPHILIZED, FOR SOLUTION INTRAVENOUS at 06:02

## 2023-02-11 RX ADMIN — CLONAZEPAM 0.5 MG: 0.5 TABLET ORAL at 12:02

## 2023-02-11 RX ADMIN — DIGOXIN 500 MCG: 0.25 INJECTION INTRAMUSCULAR; INTRAVENOUS at 02:02

## 2023-02-11 RX ADMIN — GABAPENTIN 400 MG: 400 CAPSULE ORAL at 03:02

## 2023-02-11 RX ADMIN — METOPROLOL TARTRATE 25 MG: 25 TABLET, FILM COATED ORAL at 09:02

## 2023-02-11 RX ADMIN — DULOXETINE 60 MG: 60 CAPSULE, DELAYED RELEASE ORAL at 10:02

## 2023-02-11 RX ADMIN — MUPIROCIN: 20 OINTMENT TOPICAL at 09:02

## 2023-02-11 RX ADMIN — ATORVASTATIN CALCIUM 20 MG: 20 TABLET, FILM COATED ORAL at 09:02

## 2023-02-11 RX ADMIN — METOPROLOL TARTRATE 25 MG: 25 TABLET, FILM COATED ORAL at 05:02

## 2023-02-11 RX ADMIN — Medication 9 MG: at 10:02

## 2023-02-11 RX ADMIN — LEVOFLOXACIN 750 MG: 750 TABLET, FILM COATED ORAL at 09:02

## 2023-02-11 RX ADMIN — Medication 10 ML: at 12:02

## 2023-02-11 RX ADMIN — CLONAZEPAM 0.5 MG: 0.5 TABLET ORAL at 10:02

## 2023-02-11 RX ADMIN — ACETAMINOPHEN 650 MG: 325 TABLET ORAL at 03:02

## 2023-02-11 RX ADMIN — GABAPENTIN 400 MG: 400 CAPSULE ORAL at 09:02

## 2023-02-11 RX ADMIN — PROMETHAZINE HYDROCHLORIDE AND CODEINE PHOSPHATE 5 ML: 6.25; 1 SOLUTION ORAL at 05:02

## 2023-02-11 RX ADMIN — AMITRIPTYLINE HYDROCHLORIDE 75 MG: 50 TABLET, FILM COATED ORAL at 10:02

## 2023-02-11 RX ADMIN — BENZONATATE 100 MG: 100 CAPSULE ORAL at 03:02

## 2023-02-11 RX ADMIN — GABAPENTIN 400 MG: 400 CAPSULE ORAL at 10:02

## 2023-02-11 RX ADMIN — BENZONATATE 100 MG: 100 CAPSULE ORAL at 10:02

## 2023-02-11 RX ADMIN — Medication 10 ML: at 05:02

## 2023-02-11 RX ADMIN — PROMETHAZINE HYDROCHLORIDE AND CODEINE PHOSPHATE 5 ML: 6.25; 1 SOLUTION ORAL at 10:02

## 2023-02-11 RX ADMIN — DULOXETINE 60 MG: 60 CAPSULE, DELAYED RELEASE ORAL at 09:02

## 2023-02-11 RX ADMIN — BENZONATATE 100 MG: 100 CAPSULE ORAL at 02:02

## 2023-02-11 RX ADMIN — PROMETHAZINE HYDROCHLORIDE AND CODEINE PHOSPHATE 5 ML: 6.25; 1 SOLUTION ORAL at 12:02

## 2023-02-11 RX ADMIN — MUPIROCIN: 20 OINTMENT TOPICAL at 10:02

## 2023-02-11 RX ADMIN — TIZANIDINE 4 MG: 2 TABLET ORAL at 10:02

## 2023-02-11 RX ADMIN — LEVOTHYROXINE SODIUM 50 MCG: 50 TABLET ORAL at 05:02

## 2023-02-11 RX ADMIN — Medication 10 ML: at 06:02

## 2023-02-11 NOTE — CODE/ RAPID DOCUMENTATION
RAPID RESPONSE NURSE PROACTIVE ROUNDING NOTE       Time of Visit: 2340    Admit Date: 2023  LOS: 4  Code Status: Full Code   Date of Visit: 02/10/2023  : 1970  Age: 52 y.o.  Sex: female  Race: White  Bed: 8099/8099 A:   MRN: [id number]  Was the patient discharged from an ICU this admission? Yes   Was the patient discharged from a PACU within last 24 hours? No   Did the patient receive conscious sedation/general anesthesia in last 24 hours? No  Was the patient in the ED within the past 24 hours? No  Was the patient on NIPPV within the past 24 hours? No   Attending Physician: Tyrell Polanco*  Primary Service: [facility]   Time spent at the bedside: 15 -30 min    SITUATION    Notified by [employer] patient alert.  Reason for alert: tachypnea  Called to evaluate the patient for Respiratory    BACKGROUND     Why is the patient in the hospital?: Hemoptysis    Patient has a past medical history of Hypertension, Mixed hyperlipidemia, and Thyroid disease.    Last Vitals:  Temp: 98.4 °F (36.9 °C) (02/10 1620)  Pulse: 138 (02/10 2348)  Resp: 24 (02/10 2348)  BP: 106/71 (02/10 2348)  SpO2: 99 % (02/10 2348)    24 Hours Vitals Range:  Temp:  [98 °F (36.7 °C)-98.6 °F (37 °C)]   Pulse:  [130-144]   Resp:  [18-28]   BP: ()/(55-82)   SpO2:  [92 %-100 %]     Labs:  Recent Labs     23  0939 02/09/23  2201 02/10/23  0637   WBC 23.71* 21.04* 22.69*   HGB 7.6* 6.8* 8.0*   HCT 24.4* 22.4* 25.7*   * 552* 538*       Recent Labs     23  0331 23  0939 02/10/23  0637    138 136 137   K 4.3 4.0 3.9 4.0   CL 99 97 97 96   CO2 26 29 28 29   CREATININE 0.7 0.7 0.6 0.6   * 168* 128* 127*   PHOS 4.5  --  3.2 3.4   MG 1.9  --  1.7 1.7        No results for input(s): PH, PCO2, PO2, HCO3, POCSATURATED, BE in the last 72 hours.     ASSESSMENT    Physical Exam  Constitutional:       General: She is awake. She is not in acute distress.     Interventions: Nasal cannula in place.    Eyes:      Extraocular Movements: Extraocular movements intact.      Pupils: Pupils are equal, round, and reactive to light.   Cardiovascular:      Rate and Rhythm: Tachycardia present.      Pulses: Normal pulses.      Heart sounds: S1 normal and S2 normal. No murmur heard.    No friction rub. No gallop.   Pulmonary:      Effort: No respiratory distress.      Breath sounds: No wheezing, rhonchi or rales.      Comments: SPO2 waveform unreliable, probe repositioned with resulting SPO@ %  Chest:      Chest wall: No tenderness.   Abdominal:      General: Bowel sounds are normal.      Palpations: Abdomen is soft.      Tenderness: There is no abdominal tenderness.   Skin:     General: Skin is dry.      Capillary Refill: Capillary refill takes 2 to 3 seconds.   Neurological:      General: No focal deficit present.      Mental Status: She is alert.   Psychiatric:         Behavior: Behavior is cooperative.      Comments: Pt c/o anxiety       INTERVENTIONS    The patient was seen for Respiratory problem. Staff concerns included increased oxygen requirements. The following interventions were performed: supplemental oxygen, continued pulse ox monitoring continued, and continued cardiac monitoring continued.    RECOMMENDATIONS    Continue with POC as per primary team    PROVIDER ESCALATION    Yes/No  no    Orders received and case discussed with NA.    Disposition: Remain in room 8099.    FOLLOW-UP    Bedside Linda MCKEE  updated on plan of care. Instructed to call the Rapid Response Nurse, Barney Patel RN at [phone] for additional questions or concerns.

## 2023-02-11 NOTE — SUBJECTIVE & OBJECTIVE
Interval History: Persistent tachycardic aflutter 130-140  Denies chest pain or palpitations   States had some blood tinged sputum yesterday but no overt bleeding    Review of Systems  Objective:     Vital Signs (Most Recent):  Temp: 98 °F (36.7 °C) (02/11/23 1211)  Pulse: (!) 144 (02/11/23 1456)  Resp: 16 (02/11/23 1211)  BP: 112/81 (02/11/23 1338)  SpO2: 96 % (02/11/23 1211)   Vital Signs (24h Range):  Temp:  [96.3 °F (35.7 °C)-98.4 °F (36.9 °C)] 98 °F (36.7 °C)  Pulse:  [] 144  Resp:  [16-30] 16  SpO2:  [92 %-100 %] 96 %  BP: ()/(56-88) 112/81     Weight: 98.9 kg (218 lb)  Body mass index is 36.28 kg/m².    Intake/Output Summary (Last 24 hours) at 2/11/2023 1524  Last data filed at 2/11/2023 1237  Gross per 24 hour   Intake 180 ml   Output 1750 ml   Net -1570 ml      Physical Exam  Constitutional:       General: She is not in acute distress.     Appearance: She is obese. She is not ill-appearing or diaphoretic.   HENT:      Nose:      Comments: Nasal cannula in place, no obvious bleeding in nares or mouth     Mouth/Throat:      Pharynx: Oropharynx is clear. No oropharyngeal exudate or posterior oropharyngeal erythema.      Comments: No obvious bleeding in mouth or throat  Cardiovascular:      Rate and Rhythm: Regular rhythm. Tachycardia present.      Heart sounds: Normal heart sounds. No murmur heard.  Pulmonary:      Effort: Pulmonary effort is normal. No respiratory distress.      Breath sounds: Rhonchi and rales present. No wheezing.   Abdominal:      General: Abdomen is flat. There is no distension.      Palpations: Abdomen is soft. There is no mass.      Tenderness: There is no abdominal tenderness. There is no guarding or rebound.   Musculoskeletal:         General: Normal range of motion.      Right lower leg: No edema.      Left lower leg: No edema.   Skin:     General: Skin is warm.      Coloration: Skin is not jaundiced.      Findings: No bruising.   Neurological:      Mental Status: She is  alert and oriented to person, place, and time.       MELD-Na score: 8 at 2/11/2023  1:40 AM  MELD score: 8 at 2/11/2023  1:40 AM  Calculated from:  Serum Creatinine: 0.7 mg/dL (Using min of 1 mg/dL) at 2/11/2023  1:40 AM  Serum Sodium: 137 mmol/L at 2/11/2023  1:40 AM  Total Bilirubin: 0.3 mg/dL (Using min of 1 mg/dL) at 2/11/2023  1:40 AM  INR(ratio): 1.2 at 2/11/2023  1:40 AM  Age: 52 years    Significant Labs:  CBC:  Recent Labs   Lab 02/09/23  2201 02/10/23  0637 02/11/23  0140   WBC 21.04* 22.69* 20.67*   HGB 6.8* 8.0* 8.2*   HCT 22.4* 25.7* 25.7*   * 538* 525*     CMP:  Recent Labs   Lab 02/10/23  0637 02/11/23  0140    137   K 4.0 3.8   CL 96 93*   CO2 29 31*   * 159*   BUN 13 17   CREATININE 0.6 0.7   CALCIUM 9.1 9.2   PROT 6.0 6.2   ALBUMIN 1.4* 1.5*   BILITOT 1.2* 0.3   ALKPHOS 193* 184*   AST 55* 99*   ALT 49* 75*   ANIONGAP 12 13     PTINR:  Recent Labs   Lab 02/10/23  0637 02/11/23  0140   INR 1.2 1.2       Significant Procedures:   Dobutamine Stress Test with Color Flow: No results found for this or any previous visit.

## 2023-02-11 NOTE — PROGRESS NOTES
[facility] - Telemetry Stepdown  Cardiac Electrophysiology  Progress Note    Admission Date: 2/6/2023  Code Status: Full Code   Attending Physician: Tyrell Polanco*   Expected Discharge Date: 2/13/2023  Principal Problem:Hemoptysis    Subjective:     Interval History: EP re-consulted for further management of AF/AFL. Tele reviewed and HR noted to be in 130s-140s for last several days. She denies any current dizziness, palpitations or any other issues.     Review of Systems   Constitutional: Negative for fever and malaise/fatigue.   Eyes:  Negative for blurred vision and pain.   Cardiovascular:  Negative for chest pain, dyspnea on exertion, leg swelling, orthopnea, palpitations and paroxysmal nocturnal dyspnea.   Respiratory:  Negative for cough, shortness of breath, sputum production and wheezing.    Hematologic/Lymphatic: Negative for adenopathy and bleeding problem.   Skin:  Negative for rash.   Musculoskeletal:  Negative for back pain and neck pain.   Gastrointestinal:  Negative for abdominal pain, constipation, diarrhea, nausea and vomiting.   Genitourinary:  Negative for dysuria.   Neurological:  Negative for dizziness, headaches, light-headedness and weakness.   Objective:     Vital Signs (Most Recent):  Temp: 98 °F (36.7 °C) (02/11/23 1211)  Pulse: (!) 53 (02/11/23 1211)  Resp: 16 (02/11/23 1211)  BP: 96/67 (02/11/23 1211)  SpO2: 96 % (02/11/23 1211)   Vital Signs (24h Range):  Temp:  [96.3 °F (35.7 °C)-98.4 °F (36.9 °C)] 98 °F (36.7 °C)  Pulse:  [] 53  Resp:  [16-30] 16  SpO2:  [92 %-100 %] 96 %  BP: ()/(56-88) 96/67     Weight: 98.9 kg (218 lb)  Body mass index is 36.28 kg/m².     SpO2: 96 %       Physical Exam  Constitutional:       General: She is not in acute distress.     Appearance: Normal appearance. She is not ill-appearing, toxic-appearing or diaphoretic.   HENT:      Head: Normocephalic and atraumatic.      Nose: Nose normal.   Eyes:      Extraocular Movements: Extraocular  movements intact.      Pupils: Pupils are equal, round, and reactive to light.   Cardiovascular:      Rate and Rhythm: Regular rhythm. Tachycardia present.      Heart sounds: No murmur heard.    No friction rub. No gallop.   Pulmonary:      Effort: Pulmonary effort is normal. No respiratory distress.      Breath sounds: Normal breath sounds. No wheezing or rales.   Abdominal:      General: Abdomen is flat. There is no distension.      Palpations: Abdomen is soft. There is no mass.      Tenderness: There is no abdominal tenderness.   Musculoskeletal:         General: No swelling. Normal range of motion.      Cervical back: Normal range of motion. No rigidity.      Right lower leg: No edema.      Left lower leg: No edema.   Skin:     General: Skin is warm and dry.      Coloration: Skin is not jaundiced.      Findings: No bruising.   Neurological:      General: No focal deficit present.      Mental Status: She is alert and oriented to person, place, and time.      Cranial Nerves: No cranial nerve deficit.      Motor: No weakness.       Significant Labs: BMP:   Recent Labs   Lab 02/10/23  0637 02/11/23  0140   * 159*    137   K 4.0 3.8   CL 96 93*   CO2 29 31*   BUN 13 17   CREATININE 0.6 0.7   CALCIUM 9.1 9.2   MG 1.7 1.8    and CBC:   Recent Labs   Lab 02/09/23  2201 02/10/23  0637 02/11/23  0140   WBC 21.04* 22.69* 20.67*   HGB 6.8* 8.0* 8.2*   HCT 22.4* 25.7* 25.7*   * 538* 525*       Significant Imaging: Reviewed     Assessment and Plan:    Ms. Ernandez is a 52-year-old woman with a past medical history significant for hypertension, hyperlipidemia, hypothyroidism, and obesity who was transferred from [facility] with complaints of hemoptysis. She was noted to have a solid mass in the superior aspect of the right lung, concerning for malignancy. She was noted to be in a likely atypical atrial flutter on ECG with no reported cardiac symptoms and stable hemodynamic parameters.     EP  saw pt on 2/9/23 and it was determined that since she was actively bleeding with continued hemoptysis, she was unable to be initiated on oral anticoagulation. Cardioversion (either electrical or chemical) deferred until she could be receive at least one month of uninterrupted oral anticoagulation. Plan was for rate control with Beta blocker.     EP re-consulted given pt continues to be in AFL with HR in 130s. Pt still without symptoms. Hemoptysis now imrpoved and pt noted to only have one episode yesterday,       Atrial flutter  -Review of ECG reveals CTI independent atrial flutter.   -Can increase BB to Metoprolol 50 mg QID as BP tolerates   -If pt becomes hemodynamically unstable, would recommend immediate DCCV  -Recommend IV digoxin 500 mcg now, with repeat does of 250 mcg IV for two doses. Can start 250 mcg PO digoxin tomorrow   -Restart AC when possible once hemoptysis improves     Blake Cummins MD  Cardiac Electrophysiology  [facility] - Telemetry Stepdown

## 2023-02-11 NOTE — ASSESSMENT & PLAN NOTE
CTA at outside hospital negative for PE but there was a solid mass in the superior aspect of the right lung concerning for malignancy.  Mass is partially necrotic with air bubbles peripherally.    WBC 35 and lacate normal at outside hospital      - On Vanc/levofloxacin   - FU cultures   - s/p bronch on 2/7, no mass visualized in airways to biopsy but BAL performed with results pending  - per pulm recommend full treatment with antibiotics and a follow up in pulmonary with repeat CT Scan in a few weeks time to determine which aspects of the abnormalities visualized on CT are pneumonia vs. Potential mass

## 2023-02-11 NOTE — AI DETERIORATION ALERT
"RAPID RESPONSE NURSE AI ALERT       AI alert received.    Chart Reviewed: 02/10/2023, 10:30 PM    MRN: [id number]  Bed: 8099/8099 A    Dx: Hemoptysis    Skyla Ernandez has a past medical history of Hypertension, Mixed hyperlipidemia, and Thyroid disease.    Last VS: /82 (Patient Position: Sitting)   Pulse (!) 139   Temp 98.4 °F (36.9 °C)   Resp (!) 24   Ht 5' 5" (1.651 m)   Wt 98.9 kg (218 lb)   SpO2 (!) 94%   Breastfeeding No   BMI 36.28 kg/m²     24H Vital Sign Range:  Temp:  [98 °F (36.7 °C)-98.8 °F (37.1 °C)]   Pulse:  [101-144]   Resp:  [18-28]   BP: ()/(55-82)   SpO2:  [92 %-100 %]     Level of Consciousness (AVPU): alert    Recent Labs     02/09/23  0939 02/09/23  2201 02/10/23  0637   WBC 23.71* 21.04* 22.69*   HGB 7.6* 6.8* 8.0*   HCT 24.4* 22.4* 25.7*   * 552* 538*       Recent Labs     02/08/23  0331 02/08/23 2032 02/09/23  0939 02/10/23  0637    138 136 137   K 4.3 4.0 3.9 4.0   CL 99 97 97 96   CO2 26 29 28 29   CREATININE 0.7 0.7 0.6 0.6   * 168* 128* 127*   PHOS 4.5  --  3.2 3.4   MG 1.9  --  1.7 1.7        No results for input(s): PH, PCO2, PO2, HCO3, POCSATURATED, BE in the last 72 hours.     OXYGEN:  Flow (L/min): 4          MEWS score: 4    Bedside Linda MCKEE  contacted. No concerns verbalized at this time, pt awakened with anxiety, asymptomatic now. Instructed to call [phone] for further concerns or assistance.    Barney Patel RN        "

## 2023-02-11 NOTE — PLAN OF CARE
Problem: Adult Inpatient Plan of Care  Goal: Optimal Comfort and Wellbeing  Outcome: Ongoing, Progressing     Problem: Gas Exchange Impaired  Goal: Optimal Gas Exchange  Outcome: Ongoing, Progressing     Problem: Infection  Goal: Absence of Infection Signs and Symptoms  Outcome: Ongoing, Progressing     Problem: Fall Injury Risk  Goal: Absence of Fall and Fall-Related Injury  Outcome: Met

## 2023-02-11 NOTE — NURSING
End of shift note    AAOx4  7L HF NC  ST- MD is aware  Pure wick in place  Up with assist  VSS  NADN- safety checks performed  Call light in reach

## 2023-02-11 NOTE — SUBJECTIVE & OBJECTIVE
Interval History: EP re-consulted for further management of AF/AFL. Tele reviewed and HR noted to be in 130s-140s for last several days. She denies any current dizziness, palpitations or any other issues.     Review of Systems   Constitutional: Negative for fever and malaise/fatigue.   Eyes:  Negative for blurred vision and pain.   Cardiovascular:  Negative for chest pain, dyspnea on exertion, leg swelling, orthopnea, palpitations and paroxysmal nocturnal dyspnea.   Respiratory:  Negative for cough, shortness of breath, sputum production and wheezing.    Hematologic/Lymphatic: Negative for adenopathy and bleeding problem.   Skin:  Negative for rash.   Musculoskeletal:  Negative for back pain and neck pain.   Gastrointestinal:  Negative for abdominal pain, constipation, diarrhea, nausea and vomiting.   Genitourinary:  Negative for dysuria.   Neurological:  Negative for dizziness, headaches, light-headedness and weakness.   Objective:     Vital Signs (Most Recent):  Temp: 98 °F (36.7 °C) (02/11/23 1211)  Pulse: (!) 53 (02/11/23 1211)  Resp: 16 (02/11/23 1211)  BP: 96/67 (02/11/23 1211)  SpO2: 96 % (02/11/23 1211)   Vital Signs (24h Range):  Temp:  [96.3 °F (35.7 °C)-98.4 °F (36.9 °C)] 98 °F (36.7 °C)  Pulse:  [] 53  Resp:  [16-30] 16  SpO2:  [92 %-100 %] 96 %  BP: ()/(56-88) 96/67     Weight: 98.9 kg (218 lb)  Body mass index is 36.28 kg/m².     SpO2: 96 %       Physical Exam  Constitutional:       General: She is not in acute distress.     Appearance: Normal appearance. She is not ill-appearing, toxic-appearing or diaphoretic.   HENT:      Head: Normocephalic and atraumatic.      Nose: Nose normal.   Eyes:      Extraocular Movements: Extraocular movements intact.      Pupils: Pupils are equal, round, and reactive to light.   Cardiovascular:      Rate and Rhythm: Regular rhythm. Tachycardia present.      Heart sounds: No murmur heard.    No friction rub. No gallop.   Pulmonary:      Effort: Pulmonary  effort is normal. No respiratory distress.      Breath sounds: Normal breath sounds. No wheezing or rales.   Abdominal:      General: Abdomen is flat. There is no distension.      Palpations: Abdomen is soft. There is no mass.      Tenderness: There is no abdominal tenderness.   Musculoskeletal:         General: No swelling. Normal range of motion.      Cervical back: Normal range of motion. No rigidity.      Right lower leg: No edema.      Left lower leg: No edema.   Skin:     General: Skin is warm and dry.      Coloration: Skin is not jaundiced.      Findings: No bruising.   Neurological:      General: No focal deficit present.      Mental Status: She is alert and oriented to person, place, and time.      Cranial Nerves: No cranial nerve deficit.      Motor: No weakness.       Significant Labs: BMP:   Recent Labs   Lab 02/10/23  0637 02/11/23  0140   * 159*    137   K 4.0 3.8   CL 96 93*   CO2 29 31*   BUN 13 17   CREATININE 0.6 0.7   CALCIUM 9.1 9.2   MG 1.7 1.8    and CBC:   Recent Labs   Lab 02/09/23  2201 02/10/23  0637 02/11/23  0140   WBC 21.04* 22.69* 20.67*   HGB 6.8* 8.0* 8.2*   HCT 22.4* 25.7* 25.7*   * 538* 525*       Significant Imaging: Reviewed

## 2023-02-11 NOTE — ASSESSMENT & PLAN NOTE
On 02/09, HR noted in 130s to 150s.  Patient remained asymptomatic, denied palpitations, chest pain, shortness of breath, lightheadedness or dizziness.  EKG obtained, consistent with a flutter with RVR.  Consulted EP; Started metoprolol 12.5 b.i.d, uptitrating. Holding anticoagulation given hemoptysis.  No on metoprolol 25 qid  2/11 loading with dig

## 2023-02-11 NOTE — CARE UPDATE
RAPID RESPONSE NURSE PROACTIVE ROUNDING NOTE       Time of Visit: 930    Admit Date: 2023  LOS: 5  Code Status: Full Code   Date of Visit: 2023  : 1970  Age: 52 y.o.  Sex: female  Race: White  Bed: 8099/8099 A:   MRN: [id number]  Was the patient discharged from an ICU this admission? Yes   Was the patient discharged from a PACU within last 24 hours? No   Did the patient receive conscious sedation/general anesthesia in last 24 hours? No  Was the patient in the ED within the past 24 hours? No  Was the patient on NIPPV within the past 24 hours? No   Attending Physician: Tyrell Polanco*  Primary Service: [facility]   Time spent at the bedside: < 15 min    SITUATION    Notified by previous RRN during handoff.  Reason for alert: a-flutter  Called to evaluate the patient for Dysrythmia    BACKGROUND     Why is the patient in the hospital?: Hemoptysis    Patient has a past medical history of Hypertension, Mixed hyperlipidemia, and Thyroid disease.    Last Vitals:  Temp: 97.9 °F (36.6 °C) (816)  Pulse: 142 (922)  Resp: 26 (719)  BP: 105/88 (922)  SpO2: 99 % (816)    24 Hours Vitals Range:  Temp:  [96.3 °F (35.7 °C)-98.4 °F (36.9 °C)]   Pulse:  [131-144]   Resp:  [18-30]   BP: ()/(56-88)   SpO2:  [92 %-100 %]     Labs:  Recent Labs     02/09/23  2201 02/10/23  0637 23  0140   WBC 21.04* 22.69* 20.67*   HGB 6.8* 8.0* 8.2*   HCT 22.4* 25.7* 25.7*   * 538* 525*       Recent Labs     02/09/23  0939 02/10/23  0637 23  0140    137 137   K 3.9 4.0 3.8   CL 97 96 93*   CO2 28 29 31*   CREATININE 0.6 0.6 0.7   * 127* 159*   PHOS 3.2 3.4 4.0   MG 1.7 1.7 1.8        No results for input(s): PH, PCO2, PO2, HCO3, POCSATURATED, BE in the last 72 hours.     ASSESSMENT    Physical Exam  Vitals and nursing note reviewed.   Cardiovascular:      Rate and Rhythm: Tachycardia present. Rhythm irregular.      Heart sounds: Normal heart sounds.    Pulmonary:      Effort: Pulmonary effort is normal.      Breath sounds: Examination of the right-lower field reveals decreased breath sounds. Examination of the left-lower field reveals decreased breath sounds. Decreased breath sounds present.   Neurological:      Mental Status: She is oriented to person, place, and time. Mental status is at baseline.      GCS: GCS eye subscore is 4. GCS verbal subscore is 5. GCS motor subscore is 6.       INTERVENTIONS    The patient was seen for Cardiac problem. Staff concerns included tachycardia. The following interventions were performed: continuous cardiac monitoring continued, continuous pulse ox monitoring continued, and No additional interventions needed at this time..    RECOMMENDATIONS    -Explained to patient signs and symptoms of PE, Stroke and MI and told if experiences any symptoms to notify staff immediately.  -Maintain IV access, tele and Pulse ox.       PROVIDER ESCALATION    Yes/No  no    Orders received and case discussed with NA.    Disposition: Remain in room 8099.    FOLLOW-UP    charge Jeanette MCKEE  updated on plan of care. Instructed to call the Rapid Response Nurse, Arnav Oliveros RN at [phone] for additional questions or concerns.

## 2023-02-11 NOTE — PROGRESS NOTES
"[facility] - Telemetry [facility]  Progress Note    Patient Name: Skyla Ernandez  MRN: [id number]  Patient Class: IP- Inpatient   Admission Date: 2/6/2023  Length of Stay: 5 days  Attending Physician: Tyrell Polanco*  Primary Care Provider: Primary Doctor No        Subjective:     Principal Problem:Hemoptysis        HPI:  Per ICU hand off : " 51 yo female with hypertension, hyperlipidemia, and thyroid disease who presented to [facility] Emergency Department this morning for hemoptysis. She reports 3 weeks of cough in which she first noticed blood tinged sputum 2 weeks ago. She reports that the cough has been productive of whitish brown sputum which became darker and eventually appeared dark red. The cough often interferes with sleep. She has also experienced shortness of breath over the past couple of days. She endorses sore throat, RUSSELL, and 2-3 pillow orthopnea. This morning she coughed up over a cup of bright red blood and decided to present to the ED. She denies any chronic tobacco history apart from occasional smoking cigarettes in her late teens. She denies piror homelessness, incarceration, close contacts with TB, or recent travel. She denies having a history of asthma, COPD, cirrhosis or nose bleeds but does report history of scoliosis.     Workup at the [facility] ED includes leukocytosis to 35K, hemoglobin of 6.8 now s/p 1 unit of pRBC's, thrombocytosis to 882, NT-proBNP 1640, troponin negative, and lactic acid 1.8. CXR diffuse patchy opacities scattered throughout the right middle and right lower lung zone which may be seen with pneumonia, aspiration, or edema. CTA of the chest showed no evidence of pulmonary embolism. Solid mass in the superior aspect of the right lung concerning for malignancy. Mass is partially necrotic with air bubbles peripherally. Mass abuts the pericardium and mediastinum. There is associated atelectasis/consolidation of the right middle " "lobe. Patchy opacities in the right lower lobe as well as the left lung which is nonspecific but may represent atelectasis, infectious process, or hemorrhage. She was started on levofloxacin. She was noted to be tachycardic to 100's with soft pressures 87//73 and stable oxygenation on 2LNC.     Patient transferred to [facility] medical intensive care unit for higher level of care."      Overview/Hospital Course:  Per ICU hand off: Patient admitted to MICU for further evaluation and management of hemoptysis. Hb of 6.7, received a second unit of pRBC's as patient continued to have cough with blood tinged sputum. On levofloxacin and vancomycin given leukocytosis, fever, and concerned for pneumonia. Patient remains HDS and satting well on 4L NC. Hypoxic respiratory failure likely 2/2 pneumonia vs hemoptysis vs lung mass. S/p bronchoscopy on 2/7 with BAL performed which was significant for grossly normal airways apart from sticky thick white mucus. Results from BAL unremarkable so far and cultures NGTD. Patient continues to be HDS and satting well on LFNC. Hb stable without any further hemoptysis for over 24 hours. Patient is medically ready for step down to hospital medicine.  2/9: Overnight events of hemoptysis noted.  Patient reports  soreness in lateral chest wall muscles from cough.  Heart rate noted in 130s to 150s.  EKG consistent with a flutter.  Starting metoprolol 12.5 b.i.d..  Holding Eliquis in the setting of hemoptysis. Consulted EP    2/10: Received 1 u blood yesterday with improvement in Hb. Denies further hemoptysis. SOB improved. Weaning oxygen. Afebrile  HR persistently elevated aflutter, increasing metoprolol.  Denies chest pain, denies palpitations.       Interval History: Persistent tachycardic aflutter 130-140  Denies chest pain or palpitations   States had some blood tinged sputum yesterday but no overt bleeding    Review of Systems  Objective:     Vital Signs (Most Recent):  Temp: 98 °F (36.7 °C) " (02/11/23 1211)  Pulse: (!) 144 (02/11/23 1456)  Resp: 16 (02/11/23 1211)  BP: 112/81 (02/11/23 1338)  SpO2: 96 % (02/11/23 1211)   Vital Signs (24h Range):  Temp:  [96.3 °F (35.7 °C)-98.4 °F (36.9 °C)] 98 °F (36.7 °C)  Pulse:  [] 144  Resp:  [16-30] 16  SpO2:  [92 %-100 %] 96 %  BP: ()/(56-88) 112/81     Weight: 98.9 kg (218 lb)  Body mass index is 36.28 kg/m².    Intake/Output Summary (Last 24 hours) at 2/11/2023 1524  Last data filed at 2/11/2023 1237  Gross per 24 hour   Intake 180 ml   Output 1750 ml   Net -1570 ml      Physical Exam  Constitutional:       General: She is not in acute distress.     Appearance: She is obese. She is not ill-appearing or diaphoretic.   HENT:      Nose:      Comments: Nasal cannula in place, no obvious bleeding in nares or mouth     Mouth/Throat:      Pharynx: Oropharynx is clear. No oropharyngeal exudate or posterior oropharyngeal erythema.      Comments: No obvious bleeding in mouth or throat  Cardiovascular:      Rate and Rhythm: Regular rhythm. Tachycardia present.      Heart sounds: Normal heart sounds. No murmur heard.  Pulmonary:      Effort: Pulmonary effort is normal. No respiratory distress.      Breath sounds: Rhonchi and rales present. No wheezing.   Abdominal:      General: Abdomen is flat. There is no distension.      Palpations: Abdomen is soft. There is no mass.      Tenderness: There is no abdominal tenderness. There is no guarding or rebound.   Musculoskeletal:         General: Normal range of motion.      Right lower leg: No edema.      Left lower leg: No edema.   Skin:     General: Skin is warm.      Coloration: Skin is not jaundiced.      Findings: No bruising.   Neurological:      Mental Status: She is alert and oriented to person, place, and time.       MELD-Na score: 8 at 2/11/2023  1:40 AM  MELD score: 8 at 2/11/2023  1:40 AM  Calculated from:  Serum Creatinine: 0.7 mg/dL (Using min of 1 mg/dL) at 2/11/2023  1:40 AM  Serum Sodium: 137 mmol/L at  2/11/2023  1:40 AM  Total Bilirubin: 0.3 mg/dL (Using min of 1 mg/dL) at 2/11/2023  1:40 AM  INR(ratio): 1.2 at 2/11/2023  1:40 AM  Age: 52 years    Significant Labs:  CBC:  Recent Labs   Lab 02/09/23  2201 02/10/23  0637 02/11/23  0140   WBC 21.04* 22.69* 20.67*   HGB 6.8* 8.0* 8.2*   HCT 22.4* 25.7* 25.7*   * 538* 525*     CMP:  Recent Labs   Lab 02/10/23  0637 02/11/23  0140    137   K 4.0 3.8   CL 96 93*   CO2 29 31*   * 159*   BUN 13 17   CREATININE 0.6 0.7   CALCIUM 9.1 9.2   PROT 6.0 6.2   ALBUMIN 1.4* 1.5*   BILITOT 1.2* 0.3   ALKPHOS 193* 184*   AST 55* 99*   ALT 49* 75*   ANIONGAP 12 13     PTINR:  Recent Labs   Lab 02/10/23  0637 02/11/23  0140   INR 1.2 1.2       Significant Procedures:   Dobutamine Stress Test with Color Flow: No results found for this or any previous visit.      Assessment/Plan:      * Hemoptysis  Patient with cough for 3 weeks and coughed up a cup of blood prior to presentation  Not on blood thinners, non-tobacco smoker, heavy marijuana smoker, no lung disease history   CTA at outside hospital negative for PE but there was a solid mass in the superior aspect of the right lung concerning for malignancy.  Mass is partially necrotic with air bubbles peripherally.    At outside hospital Hg 6.8, was given 1 unit pRBC   Was also given nebulized TXA at outside hospital   2/8: Hemoglobin remaining stable with resolution of hemoptysis    - Discontinue nebulized TXA  - Monitor oxygen   - s/p bronch on 2/7, see procedure note  - Consult IR for possible emoblization if uncontrolled bleeding   - prn codeine-promethazine for cough  - Trend CBC   - monitor coags      Mass of upper lobe of right lung  CTA at outside hospital negative for PE but there was a solid mass in the superior aspect of the right lung concerning for malignancy.  Mass is partially necrotic with air bubbles peripherally.    WBC 35 and lacate normal at outside hospital      - On Vanc/levofloxacin   - FU  cultures   - s/p bronch on 2/7, no mass visualized in airways to biopsy but BAL performed with results pending  - per pulm recommend full treatment with antibiotics and a follow up in pulmonary with repeat CT Scan in a few weeks time to determine which aspects of the abnormalities visualized on CT are pneumonia vs. Potential mass    Acute systolic heart failure  Results for orders placed during the hospital encounter of 02/06/23  On losartan, BB  Trial Lasix    Echo    Interpretation Summary  · Atrial fibrillation ( or Flutter?) observed.  · The left ventricle is mildly enlarged with moderately decreased systolic function.  · The estimated ejection fraction is 35%.  · There is abnormal septal wall motion.  · Mild left atrial enlargement.  · Mild right ventricular enlargement with moderately to severely reduced right ventricular systolic function.  · Elevated central venous pressure (15 mmHg).  · Mild to moderate tricuspid regurgitation.  · Mild pulmonic regurgitation.  · The estimated PA systolic pressure is 56 mmHg.  · There is moderate pulmonary hypertension.  · Small posterior pericardial effusion. Trivial under the RA.        Atrial flutter  On 02/09, HR noted in 130s to 150s.  Patient remained asymptomatic, denied palpitations, chest pain, shortness of breath, lightheadedness or dizziness.  EKG obtained, consistent with a flutter with RVR.  Consulted EP; Started metoprolol 12.5 b.i.d, uptitrating. Holding anticoagulation given hemoptysis.  No on metoprolol 25 qid  2/11 loading with dig      Anxiety and depression  - continue home bupropion, amitriptyline, clonazepam, and duloxetine.        Neuropathy  - home gabapentin, amitriptyline, duloxetine       Acute hypoxemic respiratory failure  Likely due to hemoptysis, lung mass, and possible pneumonia      - Vanc/levofloxacin   - CBC daily   - Supplemental O2 as needed     Thyroid disease  Continue home synthroid       Anemia  Hg 6.8 at outside hospital, was given 1  unit pRBC   Here with hemoptysis   Repeat Hb noted at 6.7, received a second unit of pRBC's  2/8: hemoptysis resolved and hemoglobin stable     - CBC daily   - FU coags   - Transfuse if Hg <7, platelets <10, or platelets <50 and actively bleeding       VTE Risk Mitigation (From admission, onward)         Ordered     Reason for No Pharmacological VTE Prophylaxis  Once        Question:  Reasons:  Answer:  Active Bleeding    02/06/23 1846     IP VTE HIGH RISK PATIENT  Once         02/06/23 1846     Place sequential compression device  Until discontinued         02/06/23 1846                Discharge Planning   MICHELE: 2/13/2023     Code Status: Full Code   Is the patient medically ready for discharge?:     Reason for patient still in hospital (select all that apply): Patient trending condition, Treatment and Consult recommendations  Discharge Plan A: Home with family            Tyrell Young MD  Department of Hospital Medicine   [facility] - Telemetry Stepdown

## 2023-02-12 LAB
ALBUMIN SERPL BCP-MCNC: 1.5 G/DL (ref 3.5–5.2)
ALP SERPL-CCNC: 169 U/L (ref 55–135)
ALT SERPL W/O P-5'-P-CCNC: 97 U/L (ref 10–44)
ANION GAP SERPL CALC-SCNC: 11 MMOL/L (ref 8–16)
AST SERPL-CCNC: 91 U/L (ref 10–40)
BASOPHILS # BLD AUTO: 0.09 K/UL (ref 0–0.2)
BASOPHILS NFR BLD: 0.4 % (ref 0–1.9)
BILIRUB SERPL-MCNC: 0.3 MG/DL (ref 0.1–1)
BUN SERPL-MCNC: 15 MG/DL (ref 6–20)
CALCIUM SERPL-MCNC: 9 MG/DL (ref 8.7–10.5)
CHLORIDE SERPL-SCNC: 92 MMOL/L (ref 95–110)
CO2 SERPL-SCNC: 34 MMOL/L (ref 23–29)
CREAT SERPL-MCNC: 0.6 MG/DL (ref 0.5–1.4)
DIFFERENTIAL METHOD: ABNORMAL
EOSINOPHIL # BLD AUTO: 0.6 K/UL (ref 0–0.5)
EOSINOPHIL NFR BLD: 3 % (ref 0–8)
ERYTHROCYTE [DISTWIDTH] IN BLOOD BY AUTOMATED COUNT: 17.6 % (ref 11.5–14.5)
EST. GFR  (NO RACE VARIABLE): >60 ML/MIN/1.73 M^2
GLUCOSE SERPL-MCNC: 133 MG/DL (ref 70–110)
HCT VFR BLD AUTO: 28.1 % (ref 37–48.5)
HGB BLD-MCNC: 8.5 G/DL (ref 12–16)
IMM GRANULOCYTES # BLD AUTO: 0.33 K/UL (ref 0–0.04)
IMM GRANULOCYTES NFR BLD AUTO: 1.6 % (ref 0–0.5)
LYMPHOCYTES # BLD AUTO: 1.6 K/UL (ref 1–4.8)
LYMPHOCYTES NFR BLD: 7.9 % (ref 18–48)
MAGNESIUM SERPL-MCNC: 1.6 MG/DL (ref 1.6–2.6)
MCH RBC QN AUTO: 28 PG (ref 27–31)
MCHC RBC AUTO-ENTMCNC: 30.2 G/DL (ref 32–36)
MCV RBC AUTO: 92 FL (ref 82–98)
MONOCYTES # BLD AUTO: 1.4 K/UL (ref 0.3–1)
MONOCYTES NFR BLD: 6.8 % (ref 4–15)
NEUTROPHILS # BLD AUTO: 16.3 K/UL (ref 1.8–7.7)
NEUTROPHILS NFR BLD: 80.3 % (ref 38–73)
NRBC BLD-RTO: 0 /100 WBC
PHOSPHATE SERPL-MCNC: 3.6 MG/DL (ref 2.7–4.5)
PLATELET # BLD AUTO: 608 K/UL (ref 150–450)
PMV BLD AUTO: 8.8 FL (ref 9.2–12.9)
POTASSIUM SERPL-SCNC: 4.1 MMOL/L (ref 3.5–5.1)
PROT SERPL-MCNC: 6 G/DL (ref 6–8.4)
RBC # BLD AUTO: 3.04 M/UL (ref 4–5.4)
SODIUM SERPL-SCNC: 137 MMOL/L (ref 136–145)
WBC # BLD AUTO: 20.34 K/UL (ref 3.9–12.7)

## 2023-02-12 PROCEDURE — 84100 ASSAY OF PHOSPHORUS: CPT

## 2023-02-12 PROCEDURE — 63600175 PHARM REV CODE 636 W HCPCS: Performed by: STUDENT IN AN ORGANIZED HEALTH CARE EDUCATION/TRAINING PROGRAM

## 2023-02-12 PROCEDURE — 85025 COMPLETE CBC W/AUTO DIFF WBC: CPT

## 2023-02-12 PROCEDURE — 93005 ELECTROCARDIOGRAM TRACING: CPT

## 2023-02-12 PROCEDURE — 97165 OT EVAL LOW COMPLEX 30 MIN: CPT

## 2023-02-12 PROCEDURE — 25000003 PHARM REV CODE 250: Performed by: INTERNAL MEDICINE

## 2023-02-12 PROCEDURE — 27000221 HC OXYGEN, UP TO 24 HOURS

## 2023-02-12 PROCEDURE — 93010 ELECTROCARDIOGRAM REPORT: CPT | Mod: ,,, | Performed by: INTERNAL MEDICINE

## 2023-02-12 PROCEDURE — 83735 ASSAY OF MAGNESIUM: CPT

## 2023-02-12 PROCEDURE — A4216 STERILE WATER/SALINE, 10 ML: HCPCS | Performed by: NURSE PRACTITIONER

## 2023-02-12 PROCEDURE — 93010 EKG 12-LEAD: ICD-10-PCS | Mod: ,,, | Performed by: INTERNAL MEDICINE

## 2023-02-12 PROCEDURE — 20600001 HC STEP DOWN PRIVATE ROOM

## 2023-02-12 PROCEDURE — 25000003 PHARM REV CODE 250: Performed by: HOSPITALIST

## 2023-02-12 PROCEDURE — 25000003 PHARM REV CODE 250: Performed by: NURSE PRACTITIONER

## 2023-02-12 PROCEDURE — 94799 UNLISTED PULMONARY SVC/PX: CPT

## 2023-02-12 PROCEDURE — 25000003 PHARM REV CODE 250: Performed by: STUDENT IN AN ORGANIZED HEALTH CARE EDUCATION/TRAINING PROGRAM

## 2023-02-12 PROCEDURE — 25000003 PHARM REV CODE 250

## 2023-02-12 PROCEDURE — 80053 COMPREHEN METABOLIC PANEL: CPT

## 2023-02-12 PROCEDURE — 99233 SBSQ HOSP IP/OBS HIGH 50: CPT | Mod: ,,, | Performed by: STUDENT IN AN ORGANIZED HEALTH CARE EDUCATION/TRAINING PROGRAM

## 2023-02-12 PROCEDURE — 63600175 PHARM REV CODE 636 W HCPCS: Performed by: INTERNAL MEDICINE

## 2023-02-12 PROCEDURE — 99233 PR SUBSEQUENT HOSPITAL CARE,LEVL III: ICD-10-PCS | Mod: ,,, | Performed by: STUDENT IN AN ORGANIZED HEALTH CARE EDUCATION/TRAINING PROGRAM

## 2023-02-12 PROCEDURE — 97530 THERAPEUTIC ACTIVITIES: CPT

## 2023-02-12 RX ORDER — METOPROLOL TARTRATE 50 MG/1
50 TABLET ORAL 4 TIMES DAILY
Status: DISCONTINUED | OUTPATIENT
Start: 2023-02-12 | End: 2023-02-16

## 2023-02-12 RX ORDER — DIGOXIN 125 MCG
0.25 TABLET ORAL DAILY
Status: DISCONTINUED | OUTPATIENT
Start: 2023-02-12 | End: 2023-02-17 | Stop reason: HOSPADM

## 2023-02-12 RX ORDER — DIGOXIN 0.25 MG/ML
250 INJECTION INTRAMUSCULAR; INTRAVENOUS EVERY 6 HOURS
Status: COMPLETED | OUTPATIENT
Start: 2023-02-12 | End: 2023-02-12

## 2023-02-12 RX ADMIN — Medication 10 ML: at 06:02

## 2023-02-12 RX ADMIN — VANCOMYCIN HYDROCHLORIDE 1500 MG: 1.5 INJECTION, POWDER, LYOPHILIZED, FOR SOLUTION INTRAVENOUS at 06:02

## 2023-02-12 RX ADMIN — DULOXETINE 60 MG: 60 CAPSULE, DELAYED RELEASE ORAL at 08:02

## 2023-02-12 RX ADMIN — GABAPENTIN 400 MG: 400 CAPSULE ORAL at 10:02

## 2023-02-12 RX ADMIN — LOSARTAN POTASSIUM 100 MG: 50 TABLET, FILM COATED ORAL at 08:02

## 2023-02-12 RX ADMIN — PROMETHAZINE HYDROCHLORIDE AND CODEINE PHOSPHATE 5 ML: 6.25; 1 SOLUTION ORAL at 06:02

## 2023-02-12 RX ADMIN — LEVOTHYROXINE SODIUM 50 MCG: 50 TABLET ORAL at 06:02

## 2023-02-12 RX ADMIN — PROMETHAZINE HYDROCHLORIDE AND CODEINE PHOSPHATE 5 ML: 6.25; 1 SOLUTION ORAL at 10:02

## 2023-02-12 RX ADMIN — ATORVASTATIN CALCIUM 20 MG: 20 TABLET, FILM COATED ORAL at 08:02

## 2023-02-12 RX ADMIN — BUPROPION HYDROCHLORIDE 300 MG: 150 TABLET, FILM COATED, EXTENDED RELEASE ORAL at 08:02

## 2023-02-12 RX ADMIN — DULOXETINE 60 MG: 60 CAPSULE, DELAYED RELEASE ORAL at 10:02

## 2023-02-12 RX ADMIN — METOPROLOL TARTRATE 50 MG: 50 TABLET, FILM COATED ORAL at 10:02

## 2023-02-12 RX ADMIN — Medication 9 MG: at 10:02

## 2023-02-12 RX ADMIN — METOPROLOL TARTRATE 50 MG: 50 TABLET, FILM COATED ORAL at 04:02

## 2023-02-12 RX ADMIN — DIGOXIN 0.25 MG: 125 TABLET ORAL at 10:02

## 2023-02-12 RX ADMIN — METOPROLOL TARTRATE 25 MG: 25 TABLET, FILM COATED ORAL at 12:02

## 2023-02-12 RX ADMIN — GABAPENTIN 400 MG: 400 CAPSULE ORAL at 04:02

## 2023-02-12 RX ADMIN — DIGOXIN 250 MCG: 0.25 INJECTION INTRAMUSCULAR; INTRAVENOUS at 01:02

## 2023-02-12 RX ADMIN — BENZONATATE 100 MG: 100 CAPSULE ORAL at 12:02

## 2023-02-12 RX ADMIN — Medication 10 ML: at 12:02

## 2023-02-12 RX ADMIN — GABAPENTIN 400 MG: 400 CAPSULE ORAL at 08:02

## 2023-02-12 RX ADMIN — METOPROLOL TARTRATE 25 MG: 25 TABLET, FILM COATED ORAL at 08:02

## 2023-02-12 RX ADMIN — DIGOXIN 250 MCG: 0.25 INJECTION INTRAMUSCULAR; INTRAVENOUS at 08:02

## 2023-02-12 RX ADMIN — LEVOFLOXACIN 750 MG: 750 TABLET, FILM COATED ORAL at 08:02

## 2023-02-12 RX ADMIN — PROMETHAZINE HYDROCHLORIDE AND CODEINE PHOSPHATE 5 ML: 6.25; 1 SOLUTION ORAL at 04:02

## 2023-02-12 RX ADMIN — AMITRIPTYLINE HYDROCHLORIDE 75 MG: 50 TABLET, FILM COATED ORAL at 10:02

## 2023-02-12 NOTE — CARE UPDATE
"RAPID RESPONSE NURSE CHART REVIEW       Chart Reviewed: 02/12/2023, 3:05 PM    MRN: [id number]  Bed: 8099/8099 A    Dx: Hemoptysis    Skyla Ernandez has a past medical history of Hypertension, Mixed hyperlipidemia, and Thyroid disease.    Last VS: /63   Pulse (!) 137   Temp 97.8 °F (36.6 °C) (Oral)   Resp 18   Ht 5' 5" (1.651 m)   Wt 98.9 kg (218 lb)   SpO2 (!) 90%   Breastfeeding No   BMI 36.28 kg/m²     24H Vital Sign Range:  Temp:  [96 °F (35.6 °C)-97.8 °F (36.6 °C)]   Pulse:  []   Resp:  [16-33]   BP: ()/(57-82)   SpO2:  [90 %-100 %]     Level of Consciousness (AVPU): alert    Recent Labs     02/10/23  0637 02/11/23  0140 02/12/23  0446   WBC 22.69* 20.67* 20.34*   HGB 8.0* 8.2* 8.5*   HCT 25.7* 25.7* 28.1*   * 525* 608*       Recent Labs     02/10/23  0637 02/11/23  0140 02/12/23  0446    137 137   K 4.0 3.8 4.1   CL 96 93* 92*   CO2 29 31* 34*   CREATININE 0.6 0.7 0.6   * 159* 133*   PHOS 3.4 4.0 3.6   MG 1.7 1.8 1.6        No results for input(s): PH, PCO2, PO2, HCO3, POCSATURATED, BE in the last 72 hours.     OXYGEN:  Flow (L/min): 3          MEWS score: 4    Bedside Jayde MCKEE  contacted. No concerns verbalized at this time. Instructed to call [phone] for further concerns or assistance.    Clayton Canchola RN        "

## 2023-02-12 NOTE — CODE/ RAPID DOCUMENTATION
RAPID RESPONSE NURSE PROACTIVE ROUNDING NOTE       Time of Visit:     Admit Date: 2023  LOS: 5  Code Status: Full Code   Date of Visit: 2023  : 1970  Age: 52 y.o.  Sex: female  Race: White  Bed: 8099/8099 A:   MRN: [id number]  Was the patient discharged from an ICU this admission? Yes   Was the patient discharged from a PACU within last 24 hours? No   Did the patient receive conscious sedation/general anesthesia in last 24 hours? No  Was the patient in the ED within the past 24 hours? No  Was the patient on NIPPV within the past 24 hours? No   Attending Physician: Tyrell Polanco*  Primary Service: [facility]   Time spent at the bedside: < 15 min    SITUATION    Notified by [employer] patient alert.  Reason for alert: tachycardia  Called to evaluate the patient for Dysrythmia    BACKGROUND     Why is the patient in the hospital?: Hemoptysis    Patient has a past medical history of Hypertension, Mixed hyperlipidemia, and Thyroid disease.    Last Vitals:  Temp: 97.4 °F (36.3 °C) (1622)  Pulse: 78 (2321)  Resp: 20 (2203)  BP: 108/66 ( 1726)  SpO2: 99 % (2321)    24 Hours Vitals Range:  Temp:  [96.3 °F (35.7 °C)-98.1 °F (36.7 °C)]   Pulse:  []   Resp:  [16-31]   BP: ()/(56-88)   SpO2:  [94 %-100 %]     Labs:  Recent Labs     02/09/23  2201 02/10/23  0637 23  0140   WBC 21.04* 22.69* 20.67*   HGB 6.8* 8.0* 8.2*   HCT 22.4* 25.7* 25.7*   * 538* 525*       Recent Labs     23  0939 02/10/23  0637 23  0140    137 137   K 3.9 4.0 3.8   CL 97 96 93*   CO2 28 29 31*   CREATININE 0.6 0.6 0.7   * 127* 159*   PHOS 3.2 3.4 4.0   MG 1.7 1.7 1.8        No results for input(s): PH, PCO2, PO2, HCO3, POCSATURATED, BE in the last 72 hours.     ASSESSMENT    Physical Exam    INTERVENTIONS    The patient was seen for Cardiac problem. Staff concerns included tachycardia. The following interventions were performed: No additional  interventions needed at this time..    RECOMMENDATIONS    Continue POC as per primary team  Consider cardiology consult    PROVIDER ESCALATION    Yes/No  no    Orders received and case discussed with NA.    Disposition: Remain in room 8099.    FOLLOW-UP    Bedside RNLinda  updated on plan of care. Instructed to call the Rapid Response Nurse, Barney Patel, RN at [phone] for additional questions or concerns.

## 2023-02-12 NOTE — PLAN OF CARE
Goals to be met by: 02/19/2023     Patient will increase functional independence with ADLs by performing:    LE Dressing with Contact Guard Assistance.  Grooming while standing at sink with Contact Guard Assistance.  Toileting from toilet with Stand-by Assistance for hygiene and clothing management.

## 2023-02-12 NOTE — PROGRESS NOTES
"Stephane Eng - Telemetry [facility] Medicine  Progress Note    Patient Name: Skyla Ernandez  MRN: [id number]  Patient Class: IP- Inpatient   Admission Date: 2/6/2023  Length of Stay: 6 days  Attending Physician: Tyrell Polanco*  Primary Care Provider: Primary Doctor No        Subjective:     Principal Problem:Hemoptysis        HPI:  Per ICU hand off : " 53 yo female with hypertension, hyperlipidemia, and thyroid disease who presented to [facility] Emergency Department this morning for hemoptysis. She reports 3 weeks of cough in which she first noticed blood tinged sputum 2 weeks ago. She reports that the cough has been productive of whitish brown sputum which became darker and eventually appeared dark red. The cough often interferes with sleep. She has also experienced shortness of breath over the past couple of days. She endorses sore throat, RUSSELL, and 2-3 pillow orthopnea. This morning she coughed up over a cup of bright red blood and decided to present to the ED. She denies any chronic tobacco history apart from occasional smoking cigarettes in her late teens. She denies piror homelessness, incarceration, close contacts with TB, or recent travel. She denies having a history of asthma, COPD, cirrhosis or nose bleeds but does report history of scoliosis.     Workup at the [facility] ED includes leukocytosis to 35K, hemoglobin of 6.8 now s/p 1 unit of pRBC's, thrombocytosis to 882, NT-proBNP 1640, troponin negative, and lactic acid 1.8. CXR diffuse patchy opacities scattered throughout the right middle and right lower lung zone which may be seen with pneumonia, aspiration, or edema. CTA of the chest showed no evidence of pulmonary embolism. Solid mass in the superior aspect of the right lung concerning for malignancy. Mass is partially necrotic with air bubbles peripherally. Mass abuts the pericardium and mediastinum. There is associated atelectasis/consolidation of the right middle " "lobe. Patchy opacities in the right lower lobe as well as the left lung which is nonspecific but may represent atelectasis, infectious process, or hemorrhage. She was started on levofloxacin. She was noted to be tachycardic to 100's with soft pressures 87//73 and stable oxygenation on 2LNC.     Patient transferred to [facility] medical intensive care unit for higher level of care."      Overview/Hospital Course:  Per ICU hand off: Patient admitted to MICU for further evaluation and management of hemoptysis. Hb of 6.7, received a second unit of pRBC's as patient continued to have cough with blood tinged sputum. On levofloxacin and vancomycin given leukocytosis, fever, and concerned for pneumonia. Patient remains HDS and satting well on 4L NC. Hypoxic respiratory failure likely 2/2 pneumonia vs hemoptysis vs lung mass. S/p bronchoscopy on 2/7 with BAL performed which was significant for grossly normal airways apart from sticky thick white mucus. Results from BAL unremarkable so far and cultures NGTD. Patient continues to be HDS and satting well on LFNC. Hb stable without any further hemoptysis for over 24 hours. Patient is medically ready for step down to hospital medicine.  2/9: Overnight events of hemoptysis noted.  Patient reports  soreness in lateral chest wall muscles from cough.  Heart rate noted in 130s to 150s.  EKG consistent with a flutter.  Starting metoprolol 12.5 b.i.d..  Holding Eliquis in the setting of hemoptysis. Consulted EP    2/10: Received 1 u blood yesterday with improvement in Hb. Denies further hemoptysis. SOB improved. Weaning oxygen. Afebrile  HR persistently elevated aflutter, increasing metoprolol.  Denies chest pain, denies palpitations.       Interval History: Loaded with dig yesterday, HR appeared controlled overnight 70-80s, this am appeared 130s on monitor. Pt remains asymptomatic.     Review of Systems  Objective:     Vital Signs (Most Recent):  Temp: 97.8 °F (36.6 °C) (02/12/23 " 1245)  Pulse: (!) 117 (02/12/23 1526)  Resp: 18 (02/12/23 1245)  BP: 100/63 (02/12/23 1245)  SpO2: (!) 94 % (02/12/23 1526)   Vital Signs (24h Range):  Temp:  [96 °F (35.6 °C)-97.8 °F (36.6 °C)] 97.8 °F (36.6 °C)  Pulse:  [] 117  Resp:  [16-33] 18  SpO2:  [90 %-100 %] 94 %  BP: ()/(57-82) 100/63     Weight: 98.9 kg (218 lb)  Body mass index is 36.28 kg/m².    Intake/Output Summary (Last 24 hours) at 2/12/2023 1605  Last data filed at 2/12/2023 1032  Gross per 24 hour   Intake 868 ml   Output 1250 ml   Net -382 ml      Physical Exam  Constitutional:       General: She is not in acute distress.     Appearance: She is obese. She is not ill-appearing or diaphoretic.   HENT:      Nose:      Comments: Nasal cannula in place, no obvious bleeding in nares or mouth     Mouth/Throat:      Pharynx: Oropharynx is clear. No oropharyngeal exudate or posterior oropharyngeal erythema.      Comments: No obvious bleeding in mouth or throat  Cardiovascular:      Rate and Rhythm: Regular rhythm. Tachycardia present.      Heart sounds: Normal heart sounds. No murmur heard.  Pulmonary:      Effort: Pulmonary effort is normal. No respiratory distress.      Breath sounds: Rhonchi and rales present. No wheezing.   Abdominal:      General: Abdomen is flat. There is no distension.      Palpations: Abdomen is soft. There is no mass.      Tenderness: There is no abdominal tenderness. There is no guarding or rebound.   Musculoskeletal:         General: Normal range of motion.      Right lower leg: No edema.      Left lower leg: No edema.   Skin:     General: Skin is warm.      Coloration: Skin is not jaundiced.      Findings: No bruising.   Neurological:      Mental Status: She is alert and oriented to person, place, and time.       MELD-Na score: 8 at 2/12/2023  4:46 AM  MELD score: 8 at 2/12/2023  4:46 AM  Calculated from:  Serum Creatinine: 0.6 mg/dL (Using min of 1 mg/dL) at 2/12/2023  4:46 AM  Serum Sodium: 137 mmol/L at  2/12/2023  4:46 AM  Total Bilirubin: 0.3 mg/dL (Using min of 1 mg/dL) at 2/12/2023  4:46 AM  INR(ratio): 1.2 at 2/11/2023  1:40 AM  Age: 52 years    Significant Labs:  CBC:  Recent Labs   Lab 02/11/23  0140 02/12/23  0446   WBC 20.67* 20.34*   HGB 8.2* 8.5*   HCT 25.7* 28.1*   * 608*     CMP:  Recent Labs   Lab 02/11/23 0140 02/12/23 0446    137   K 3.8 4.1   CL 93* 92*   CO2 31* 34*   * 133*   BUN 17 15   CREATININE 0.7 0.6   CALCIUM 9.2 9.0   PROT 6.2 6.0   ALBUMIN 1.5* 1.5*   BILITOT 0.3 0.3   ALKPHOS 184* 169*   AST 99* 91*   ALT 75* 97*   ANIONGAP 13 11     PTINR:  Recent Labs   Lab 02/11/23 0140   INR 1.2       Significant Procedures:   Dobutamine Stress Test with Color Flow: No results found for this or any previous visit.      Assessment/Plan:      * Hemoptysis  Patient with cough for 3 weeks and coughed up a cup of blood prior to presentation  Not on blood thinners, non-tobacco smoker, heavy marijuana smoker, no lung disease history   CTA at outside hospital negative for PE but there was a solid mass in the superior aspect of the right lung concerning for malignancy.  Mass is partially necrotic with air bubbles peripherally.    At outside hospital Hg 6.8, was given 1 unit pRBC   Was also given nebulized TXA at outside hospital   2/8: Hemoglobin remaining stable with resolution of hemoptysis    - Discontinue nebulized TXA  - Monitor oxygen   - s/p bronch on 2/7, see procedure note  - Consult IR for possible emoblization if uncontrolled bleeding   - prn codeine-promethazine for cough  - Trend CBC   - monitor coags      Mass of upper lobe of right lung  CTA at outside hospital negative for PE but there was a solid mass in the superior aspect of the right lung concerning for malignancy.  Mass is partially necrotic with air bubbles peripherally.    WBC 35 and lacate normal at outside hospital      - On Vanc/levofloxacin   - FU cultures   - s/p bronch on 2/7, no mass visualized in airways  to biopsy but BAL performed with results pending  - per pulm recommend full treatment with antibiotics and a follow up in pulmonary with repeat CT Scan in a few weeks time to determine which aspects of the abnormalities visualized on CT are pneumonia vs. Potential mass    Acute systolic heart failure  Results for orders placed during the hospital encounter of 02/06/23  On losartan, BB  Trial Lasix    Echo    Interpretation Summary  · Atrial fibrillation ( or Flutter?) observed.  · The left ventricle is mildly enlarged with moderately decreased systolic function.  · The estimated ejection fraction is 35%.  · There is abnormal septal wall motion.  · Mild left atrial enlargement.  · Mild right ventricular enlargement with moderately to severely reduced right ventricular systolic function.  · Elevated central venous pressure (15 mmHg).  · Mild to moderate tricuspid regurgitation.  · Mild pulmonic regurgitation.  · The estimated PA systolic pressure is 56 mmHg.  · There is moderate pulmonary hypertension.  · Small posterior pericardial effusion. Trivial under the RA.        Atrial flutter  On 02/09, HR noted in 130s to 150s.  Patient remained asymptomatic, denied palpitations, chest pain, shortness of breath, lightheadedness or dizziness.  EKG obtained, consistent with a flutter with RVR.  Consulted EP; Started metoprolol 12.5 b.i.d, uptitrating. Holding anticoagulation given hemoptysis.  2/11 loaded with dig and started PO  Increasing metoprolol to 50 mg qid      Anxiety and depression  - continue home bupropion, amitriptyline, clonazepam, and duloxetine.        Neuropathy  - home gabapentin, amitriptyline, duloxetine       Acute hypoxemic respiratory failure  Likely due to hemoptysis, lung mass, and possible pneumonia      - Vanc/levofloxacin   - CBC daily   - Supplemental O2 as needed     Thyroid disease  Continue home synthroid       Anemia  Hg 6.8 at outside hospital, was given 1 unit pRBC   Here with hemoptysis    Repeat Hb noted at 6.7, received a second unit of pRBC's  2/8: hemoptysis resolved and hemoglobin stable     - CBC daily   - FU coags   - Transfuse if Hg <7, platelets <10, or platelets <50 and actively bleeding       VTE Risk Mitigation (From admission, onward)         Ordered     Reason for No Pharmacological VTE Prophylaxis  Once        Question:  Reasons:  Answer:  Active Bleeding    02/06/23 1846     IP VTE HIGH RISK PATIENT  Once         02/06/23 1846     Place sequential compression device  Until discontinued         02/06/23 1846                Discharge Planning   MICHELE: 2/13/2023     Code Status: Full Code   Is the patient medically ready for discharge?:     Reason for patient still in hospital (select all that apply): Patient trending condition, Treatment and Consult recommendations  Discharge Plan A: Home with family        Tyrell Young MD  Department of Hospital Medicine   Stephane Eng - Telemetry Stepdown

## 2023-02-12 NOTE — PT/OT/SLP EVAL
Occupational Therapy   Evaluation    Name: Skyla Ernandez  MRN: [id number]  Admitting Diagnosis: Hemoptysis  Recent Surgery: * No surgery found *      Recommendations:     Discharge Recommendations: home health OT  Discharge Equipment Recommendations:  walker, rolling  Barriers to discharge:       Assessment:     Skyla Ernandez is a 52 y.o. female with a medical diagnosis of Hemoptysis.  She presents with no pain on this date and did well to tolerate today's session. Client is functioning below her baseline and is limited in her ability to engage in ADLs due to her balance deficits, increased Heart Rate, and functional mobility. This client would continue to benefit from OT services that address functional mobility, balance, and engagement in ADLs. Performance deficits affecting function: weakness, impaired endurance, impaired self care skills, impaired functional mobility, gait instability, impaired balance, impaired cardiopulmonary response to activity.      Rehab Prognosis: Good; patient would benefit from acute skilled OT services to address these deficits and reach maximum level of function.       Plan:     Patient to be seen 3 x/week to address the above listed problems via self-care/home management, therapeutic activities, therapeutic exercises  Plan of Care Expires: 03/14/23  Plan of Care Reviewed with: patient, spouse    Subjective     Chief Complaint: Coughing causing pain  Patient/Family Comments/goals: Return to RV/Home    Occupational Profile:  Living Environment: Client currently lives in  that has 4 steps to enter with rails present to grasp, with  and 3 dogs. Client has access to a shower that has a built in shower bench. Client no longer works but does drive. Client enjoys riding motorcycles with , going to dog park, and eating out.  Previous level of function: Client was previously indp. With ADLs  Roles and Routines: , motorcycle rider, care giver to animals  Equipment Used at  Home: shower chair  Assistance upon Discharge: Clients  is available to assist upon discharge.    Pain/Comfort:  Pain Rating 1: 0/10    Patients cultural, spiritual, Confucianist conflicts given the current situation: no    Objective:     Communicated with: RN prior to session.  Patient found HOB elevated with PureWick, telemetry, oxygen upon OT entry to room.    General Precautions: Standard, fall  Orthopedic Precautions: N/A  Braces: N/A  Respiratory Status: Nasal cannula, flow 3 L/min    Occupational Performance:    Bed Mobility:    Patient completed Rolling/Turning to Left with  stand by assistance  Patient completed Scooting/Bridging with stand by assistance  Patient completed Supine to Sit with stand by assistance    Functional Mobility/Transfers:  Patient completed Sit <> Stand Transfer with contact guard assistance  with  no assistive device   Patient completed Bed <> Chair Transfer using Step Transfer technique with contact guard assistance with no assistive device  Functional Mobility: Client was able to complete transfers (~5 steps) with CGA provided. No losses of balance to report. Client was able to sit EOB with good balance for ~5 min.    Activities of Daily Living:  Grooming: supervision Client was able to complete grooming routine (tooth brushing and face washing) while seated EOB with supervision for balance.  Upper Body Dressing: minimum assistance Client required min assistance for threading arms and lines and managing gown.    Cognitive/Visual Perceptual:  Cognitive/Psychosocial Skills:     -       Oriented to: Person, Place, Time, and Situation   -       Follows Commands/attention:Follows multistep  commands  -       Communication: clear/fluent  -       Memory: No Deficits noted  -       Safety awareness/insight to disability: intact   -       Mood/Affect/Coping skills/emotional control: Appropriate to situation  Visual/Perceptual:      -Intact No deficits noted    Physical Exam:  Balance:     -       Client had   Upper Extremity Range of Motion:     -       Right Upper Extremity: WFL  -       Left Upper Extremity: WFL  Upper Extremity Strength:    -       Right Upper Extremity: WFL  -       Left Upper Extremity: WFL   Strength:    -       Right Upper Extremity: WFL  -       Left Upper Extremity: WFL  Fine Motor Coordination:    -       Intact  Gross motor coordination:   WFL    AMPAC 6 Click ADL:  AMPAC Total Score: 20    Treatment & Education:  Client was educated to POC and discharge recommendations.  Client was educated to benefits of therapy and engagement to build endurance and activity tolerance.  Client was noted to be coughing throughout session coughing up blood - was placed within cup as requested by physician.    Patient left up in chair with all lines intact, call button in reach, and spouse present    GOALS:   Multidisciplinary Problems       Occupational Therapy Goals          Problem: Occupational Therapy    Goal Priority Disciplines Outcome Interventions   Occupational Therapy Goal     OT, PT/OT     Description: Goals to be met by: 02/19/2023     Patient will increase functional independence with ADLs by performing:    LE Dressing with Contact Guard Assistance.  Grooming while standing at sink with Contact Guard Assistance.  Toileting from toilet with Stand-by Assistance for hygiene and clothing management.                          History:     Past Medical History:   Diagnosis Date    Hypertension     Mixed hyperlipidemia     Thyroid disease          Past Surgical History:   Procedure Laterality Date    ABDOMINAL SURGERY      TONSILLECTOMY         Time Tracking:     OT Date of Treatment: 02/12/23  OT Start Time: 1031  OT Stop Time: 1100  OT Total Time (min): 29 min    Billable Minutes:Evaluation 8 Min  Therapeutic Activity 21 Min    2/12/2023

## 2023-02-12 NOTE — RESPIRATORY THERAPY
RAPID RESPONSE RESPIRATORY THERAPY PROACTIVE ROUNDING NOTE             Time of visit: 08     Code Status: Full Code   : 1970  Bed: 8099/8099 A:   MRN: [id number]  Time spent at the bedside: < 15 min    SITUATION    Evaluated patient for: HFNC Compliance     BACKGROUND    Patient has a past medical history of Hypertension, Mixed hyperlipidemia, and Thyroid disease.    24 Hours Vitals Range:  Temp:  [96 °F (35.6 °C)-98 °F (36.7 °C)]   Pulse:  []   Resp:  [16-33]   BP: ()/(57-82)   SpO2:  [91 %-100 %]     Labs:    Recent Labs     02/10/23  0637 23  0140 23  0446    137 137   K 4.0 3.8 4.1   CL 96 93* 92*   CO2 29 31* 34*   CREATININE 0.6 0.7 0.6   * 159* 133*   PHOS 3.4 4.0 3.6   MG 1.7 1.8 1.6        No results for input(s): PH, PCO2, PO2, HCO3, POCSATURATED, BE in the last 72 hours.    ASSESSMENT/INTERVENTIONS    Pt resting comfortably with no respiratory needs at this time.    Last VS   Temp: 97.4 °F (36.3 °C) ( 0500)  Pulse: 145 ( 1120)  Resp: 18 ( 1031)  BP: 123/64 ( 0742)  SpO2: 91 % ( 1120)    Level of Consciousness: Level of Consciousness (AVPU): alert  Respiratory Effort: Respiratory Effort: Unlabored Expansion/Accessory Muscle Usage: Expansion/Accessory Muscles/Retractions: expansion symmetric, no retractions, no use of accessory muscles  All Lung Field Breath Sounds: All Lung Fields Breath Sounds: Anterior:, Lateral:, wheezes, expiratory  NIESHA Breath Sounds: wheezes, expiratory, wheezes, inspiratory  LLL Breath Sounds: diminished  RUL Breath Sounds: wheezes, expiratory, wheezes, inspiratory  RML Breath Sounds: wheezes, expiratory  RLL Breath Sounds: wheezes, expiratory  O2 Device/Concentration: 3LPM N/C.  Was the O2 device able to be weaned? No  Ambu at bedside: Ambu bag with the patient?: Yes, Adult Ambu    Active Orders   Respiratory Care    Inhalation Treatment Q4H PRN     Frequency: Q4H PRN     Number of Occurrences: Until Specified     Oxygen Continuous     Frequency: Continuous     Number of Occurrences: Until Specified     Order Questions:      Device type: High flow      Device: High Flow Nasal Cannula (6 -15 Liters)      LPM: 7      Titrate O2 per Oxygen Titration Protocol: Yes      To maintain SpO2 goal of: >= 92%      Notify MD of: Inability to achieve desired SpO2; Sudden change in patient status and requires 20% increase in FiO2; Patient requires >60% FiO2    POCT ARTERIAL BLOOD GAS Blood Gas     Frequency: Once     Number of Occurrences: 1 Occurrences     Order Comments: Notify Physician if: see parameters below.       Order Questions:      Component: Blood Gas       RECOMMENDATIONS    We recommend: RRT Recs: Continue POC per primary team.      FOLLOW-UP    Please call back the Rapid Response RTJose Angel RRT at x [phone] for any questions or concerns.

## 2023-02-12 NOTE — ASSESSMENT & PLAN NOTE
On 02/09, HR noted in 130s to 150s.  Patient remained asymptomatic, denied palpitations, chest pain, shortness of breath, lightheadedness or dizziness.  EKG obtained, consistent with a flutter with RVR.  Consulted EP; Started metoprolol 12.5 b.i.d, uptitrating. Holding anticoagulation given hemoptysis.  2/11 loaded with dig and started PO  Increasing metoprolol to 50 mg qid

## 2023-02-12 NOTE — SUBJECTIVE & OBJECTIVE
Interval History: Loaded with dig yesterday, HR appeared controlled overnight 70-80s, this am appeared 130s on monitor. Pt remains asymptomatic.     Review of Systems  Objective:     Vital Signs (Most Recent):  Temp: 97.8 °F (36.6 °C) (02/12/23 1245)  Pulse: (!) 117 (02/12/23 1526)  Resp: 18 (02/12/23 1245)  BP: 100/63 (02/12/23 1245)  SpO2: (!) 94 % (02/12/23 1526)   Vital Signs (24h Range):  Temp:  [96 °F (35.6 °C)-97.8 °F (36.6 °C)] 97.8 °F (36.6 °C)  Pulse:  [] 117  Resp:  [16-33] 18  SpO2:  [90 %-100 %] 94 %  BP: ()/(57-82) 100/63     Weight: 98.9 kg (218 lb)  Body mass index is 36.28 kg/m².    Intake/Output Summary (Last 24 hours) at 2/12/2023 1605  Last data filed at 2/12/2023 1032  Gross per 24 hour   Intake 868 ml   Output 1250 ml   Net -382 ml      Physical Exam  Constitutional:       General: She is not in acute distress.     Appearance: She is obese. She is not ill-appearing or diaphoretic.   HENT:      Nose:      Comments: Nasal cannula in place, no obvious bleeding in nares or mouth     Mouth/Throat:      Pharynx: Oropharynx is clear. No oropharyngeal exudate or posterior oropharyngeal erythema.      Comments: No obvious bleeding in mouth or throat  Cardiovascular:      Rate and Rhythm: Regular rhythm. Tachycardia present.      Heart sounds: Normal heart sounds. No murmur heard.  Pulmonary:      Effort: Pulmonary effort is normal. No respiratory distress.      Breath sounds: Rhonchi and rales present. No wheezing.   Abdominal:      General: Abdomen is flat. There is no distension.      Palpations: Abdomen is soft. There is no mass.      Tenderness: There is no abdominal tenderness. There is no guarding or rebound.   Musculoskeletal:         General: Normal range of motion.      Right lower leg: No edema.      Left lower leg: No edema.   Skin:     General: Skin is warm.      Coloration: Skin is not jaundiced.      Findings: No bruising.   Neurological:      Mental Status: She is alert and  oriented to person, place, and time.       MELD-Na score: 8 at 2/12/2023  4:46 AM  MELD score: 8 at 2/12/2023  4:46 AM  Calculated from:  Serum Creatinine: 0.6 mg/dL (Using min of 1 mg/dL) at 2/12/2023  4:46 AM  Serum Sodium: 137 mmol/L at 2/12/2023  4:46 AM  Total Bilirubin: 0.3 mg/dL (Using min of 1 mg/dL) at 2/12/2023  4:46 AM  INR(ratio): 1.2 at 2/11/2023  1:40 AM  Age: 52 years    Significant Labs:  CBC:  Recent Labs   Lab 02/11/23 0140 02/12/23 0446   WBC 20.67* 20.34*   HGB 8.2* 8.5*   HCT 25.7* 28.1*   * 608*     CMP:  Recent Labs   Lab 02/11/23 0140 02/12/23 0446    137   K 3.8 4.1   CL 93* 92*   CO2 31* 34*   * 133*   BUN 17 15   CREATININE 0.7 0.6   CALCIUM 9.2 9.0   PROT 6.2 6.0   ALBUMIN 1.5* 1.5*   BILITOT 0.3 0.3   ALKPHOS 184* 169*   AST 99* 91*   ALT 75* 97*   ANIONGAP 13 11     PTINR:  Recent Labs   Lab 02/11/23 0140   INR 1.2       Significant Procedures:   Dobutamine Stress Test with Color Flow: No results found for this or any previous visit.

## 2023-02-13 LAB
ALBUMIN SERPL BCP-MCNC: 1.6 G/DL (ref 3.5–5.2)
ALP SERPL-CCNC: 156 U/L (ref 55–135)
ALT SERPL W/O P-5'-P-CCNC: 73 U/L (ref 10–44)
ANION GAP SERPL CALC-SCNC: 9 MMOL/L (ref 8–16)
AST SERPL-CCNC: 41 U/L (ref 10–40)
BASOPHILS # BLD AUTO: 0.08 K/UL (ref 0–0.2)
BASOPHILS NFR BLD: 0.4 % (ref 0–1.9)
BILIRUB SERPL-MCNC: 0.4 MG/DL (ref 0.1–1)
BUN SERPL-MCNC: 12 MG/DL (ref 6–20)
CALCIUM SERPL-MCNC: 9.4 MG/DL (ref 8.7–10.5)
CHLORIDE SERPL-SCNC: 91 MMOL/L (ref 95–110)
CO2 SERPL-SCNC: 38 MMOL/L (ref 23–29)
CREAT SERPL-MCNC: 0.6 MG/DL (ref 0.5–1.4)
DIFFERENTIAL METHOD: ABNORMAL
EOSINOPHIL # BLD AUTO: 0.5 K/UL (ref 0–0.5)
EOSINOPHIL NFR BLD: 2.8 % (ref 0–8)
ERYTHROCYTE [DISTWIDTH] IN BLOOD BY AUTOMATED COUNT: 17.3 % (ref 11.5–14.5)
EST. GFR  (NO RACE VARIABLE): >60 ML/MIN/1.73 M^2
FINAL PATHOLOGIC DIAGNOSIS: NORMAL
GLUCOSE SERPL-MCNC: 121 MG/DL (ref 70–110)
HCT VFR BLD AUTO: 29 % (ref 37–48.5)
HGB BLD-MCNC: 8.7 G/DL (ref 12–16)
IMM GRANULOCYTES # BLD AUTO: 0.29 K/UL (ref 0–0.04)
IMM GRANULOCYTES NFR BLD AUTO: 1.5 % (ref 0–0.5)
LYMPHOCYTES # BLD AUTO: 1.5 K/UL (ref 1–4.8)
LYMPHOCYTES NFR BLD: 7.4 % (ref 18–48)
Lab: NORMAL
MAGNESIUM SERPL-MCNC: 1.6 MG/DL (ref 1.6–2.6)
MCH RBC QN AUTO: 27.4 PG (ref 27–31)
MCHC RBC AUTO-ENTMCNC: 30 G/DL (ref 32–36)
MCV RBC AUTO: 92 FL (ref 82–98)
MONOCYTES # BLD AUTO: 1.3 K/UL (ref 0.3–1)
MONOCYTES NFR BLD: 6.7 % (ref 4–15)
NEUTROPHILS # BLD AUTO: 15.8 K/UL (ref 1.8–7.7)
NEUTROPHILS NFR BLD: 81.2 % (ref 38–73)
NRBC BLD-RTO: 0 /100 WBC
PHOSPHATE SERPL-MCNC: 4 MG/DL (ref 2.7–4.5)
PLATELET # BLD AUTO: 601 K/UL (ref 150–450)
PMV BLD AUTO: 8.6 FL (ref 9.2–12.9)
POTASSIUM SERPL-SCNC: 4.2 MMOL/L (ref 3.5–5.1)
PROT SERPL-MCNC: 6 G/DL (ref 6–8.4)
RBC # BLD AUTO: 3.17 M/UL (ref 4–5.4)
SODIUM SERPL-SCNC: 138 MMOL/L (ref 136–145)
VANCOMYCIN TROUGH SERPL-MCNC: 21.9 UG/ML (ref 10–22)
WBC # BLD AUTO: 19.5 K/UL (ref 3.9–12.7)

## 2023-02-13 PROCEDURE — 25000003 PHARM REV CODE 250: Performed by: HOSPITALIST

## 2023-02-13 PROCEDURE — 99232 PR SUBSEQUENT HOSPITAL CARE,LEVL II: ICD-10-PCS | Mod: ,,, | Performed by: INTERNAL MEDICINE

## 2023-02-13 PROCEDURE — 25000003 PHARM REV CODE 250: Performed by: NURSE PRACTITIONER

## 2023-02-13 PROCEDURE — 25000003 PHARM REV CODE 250: Performed by: STUDENT IN AN ORGANIZED HEALTH CARE EDUCATION/TRAINING PROGRAM

## 2023-02-13 PROCEDURE — 63600175 PHARM REV CODE 636 W HCPCS: Mod: TB,JG | Performed by: STUDENT IN AN ORGANIZED HEALTH CARE EDUCATION/TRAINING PROGRAM

## 2023-02-13 PROCEDURE — 99233 PR SUBSEQUENT HOSPITAL CARE,LEVL III: ICD-10-PCS | Mod: ,,, | Performed by: STUDENT IN AN ORGANIZED HEALTH CARE EDUCATION/TRAINING PROGRAM

## 2023-02-13 PROCEDURE — 83735 ASSAY OF MAGNESIUM: CPT

## 2023-02-13 PROCEDURE — 93010 ELECTROCARDIOGRAM REPORT: CPT | Mod: ,,, | Performed by: INTERNAL MEDICINE

## 2023-02-13 PROCEDURE — 25000003 PHARM REV CODE 250: Performed by: INTERNAL MEDICINE

## 2023-02-13 PROCEDURE — 20600001 HC STEP DOWN PRIVATE ROOM

## 2023-02-13 PROCEDURE — 85025 COMPLETE CBC W/AUTO DIFF WBC: CPT

## 2023-02-13 PROCEDURE — 99232 SBSQ HOSP IP/OBS MODERATE 35: CPT | Mod: ,,, | Performed by: INTERNAL MEDICINE

## 2023-02-13 PROCEDURE — 97530 THERAPEUTIC ACTIVITIES: CPT

## 2023-02-13 PROCEDURE — 93005 ELECTROCARDIOGRAM TRACING: CPT

## 2023-02-13 PROCEDURE — 84100 ASSAY OF PHOSPHORUS: CPT

## 2023-02-13 PROCEDURE — 63600175 PHARM REV CODE 636 W HCPCS: Performed by: INTERNAL MEDICINE

## 2023-02-13 PROCEDURE — 93010 EKG 12-LEAD: ICD-10-PCS | Mod: ,,, | Performed by: INTERNAL MEDICINE

## 2023-02-13 PROCEDURE — 80053 COMPREHEN METABOLIC PANEL: CPT

## 2023-02-13 PROCEDURE — A4216 STERILE WATER/SALINE, 10 ML: HCPCS | Performed by: NURSE PRACTITIONER

## 2023-02-13 PROCEDURE — 25000003 PHARM REV CODE 250

## 2023-02-13 PROCEDURE — 97161 PT EVAL LOW COMPLEX 20 MIN: CPT

## 2023-02-13 PROCEDURE — 97116 GAIT TRAINING THERAPY: CPT

## 2023-02-13 PROCEDURE — 80202 ASSAY OF VANCOMYCIN: CPT | Performed by: STUDENT IN AN ORGANIZED HEALTH CARE EDUCATION/TRAINING PROGRAM

## 2023-02-13 PROCEDURE — 99233 SBSQ HOSP IP/OBS HIGH 50: CPT | Mod: ,,, | Performed by: STUDENT IN AN ORGANIZED HEALTH CARE EDUCATION/TRAINING PROGRAM

## 2023-02-13 RX ADMIN — DULOXETINE 60 MG: 60 CAPSULE, DELAYED RELEASE ORAL at 09:02

## 2023-02-13 RX ADMIN — BUPROPION HYDROCHLORIDE 300 MG: 150 TABLET, FILM COATED, EXTENDED RELEASE ORAL at 09:02

## 2023-02-13 RX ADMIN — PROMETHAZINE HYDROCHLORIDE AND CODEINE PHOSPHATE 5 ML: 6.25; 1 SOLUTION ORAL at 06:02

## 2023-02-13 RX ADMIN — Medication 10 ML: at 11:02

## 2023-02-13 RX ADMIN — PROMETHAZINE HYDROCHLORIDE AND CODEINE PHOSPHATE 5 ML: 6.25; 1 SOLUTION ORAL at 11:02

## 2023-02-13 RX ADMIN — LOSARTAN POTASSIUM 100 MG: 50 TABLET, FILM COATED ORAL at 09:02

## 2023-02-13 RX ADMIN — VANCOMYCIN HYDROCHLORIDE 1250 MG: 5 INJECTION, POWDER, LYOPHILIZED, FOR SOLUTION INTRAVENOUS at 09:02

## 2023-02-13 RX ADMIN — GABAPENTIN 400 MG: 400 CAPSULE ORAL at 09:02

## 2023-02-13 RX ADMIN — BENZONATATE 100 MG: 100 CAPSULE ORAL at 09:02

## 2023-02-13 RX ADMIN — ACETAMINOPHEN 650 MG: 325 TABLET ORAL at 05:02

## 2023-02-13 RX ADMIN — PROMETHAZINE HYDROCHLORIDE AND CODEINE PHOSPHATE 5 ML: 6.25; 1 SOLUTION ORAL at 09:02

## 2023-02-13 RX ADMIN — METOPROLOL TARTRATE 50 MG: 50 TABLET, FILM COATED ORAL at 05:02

## 2023-02-13 RX ADMIN — METOPROLOL TARTRATE 50 MG: 50 TABLET, FILM COATED ORAL at 09:02

## 2023-02-13 RX ADMIN — DIGOXIN 0.25 MG: 125 TABLET ORAL at 09:02

## 2023-02-13 RX ADMIN — Medication 10 ML: at 06:02

## 2023-02-13 RX ADMIN — LEVOFLOXACIN 750 MG: 750 TABLET, FILM COATED ORAL at 09:02

## 2023-02-13 RX ADMIN — PROMETHAZINE HYDROCHLORIDE AND CODEINE PHOSPHATE 5 ML: 6.25; 1 SOLUTION ORAL at 05:02

## 2023-02-13 RX ADMIN — AMITRIPTYLINE HYDROCHLORIDE 75 MG: 50 TABLET, FILM COATED ORAL at 09:02

## 2023-02-13 RX ADMIN — METOPROLOL TARTRATE 50 MG: 50 TABLET, FILM COATED ORAL at 01:02

## 2023-02-13 RX ADMIN — VANCOMYCIN HYDROCHLORIDE 1500 MG: 1.5 INJECTION, POWDER, LYOPHILIZED, FOR SOLUTION INTRAVENOUS at 06:02

## 2023-02-13 RX ADMIN — Medication 9 MG: at 09:02

## 2023-02-13 RX ADMIN — ATORVASTATIN CALCIUM 20 MG: 20 TABLET, FILM COATED ORAL at 09:02

## 2023-02-13 RX ADMIN — LEVOTHYROXINE SODIUM 50 MCG: 50 TABLET ORAL at 06:02

## 2023-02-13 RX ADMIN — GABAPENTIN 400 MG: 400 CAPSULE ORAL at 05:02

## 2023-02-13 NOTE — PROGRESS NOTES
[facility] - Telemetry Stepdown  Cardiac Electrophysiology  Progress Note    Admission Date: 2/6/2023  Code Status: Full Code   Attending Physician: Tyrell Polanco*   Expected Discharge Date: 2/13/2023  Principal Problem:Hemoptysis    Subjective:     Interval History: NAEON. Pt with atrial flutter. Rate now controlled.     Review of Systems   Constitutional: Negative for fever and malaise/fatigue.   Eyes:  Negative for blurred vision and pain.   Cardiovascular:  Negative for chest pain, dyspnea on exertion, leg swelling, orthopnea, palpitations and paroxysmal nocturnal dyspnea.   Respiratory:  Negative for cough, shortness of breath, sputum production and wheezing.    Hematologic/Lymphatic: Negative for adenopathy and bleeding problem.   Skin:  Negative for rash.   Musculoskeletal:  Negative for back pain and neck pain.   Gastrointestinal:  Negative for abdominal pain, constipation, diarrhea, nausea and vomiting.   Genitourinary:  Negative for dysuria.   Neurological:  Negative for dizziness, headaches, light-headedness and weakness.   Objective:     Vital Signs (Most Recent):  Temp: 98.9 °F (37.2 °C) (02/13/23 0326)  Pulse: 74 (02/13/23 0747)  Resp: 18 (02/13/23 0659)  BP: (!) 97/55 (02/13/23 0058)  SpO2: (!) 94 % (02/13/23 0256)   Vital Signs (24h Range):  Temp:  [97 °F (36.1 °C)-98.9 °F (37.2 °C)] 98.9 °F (37.2 °C)  Pulse:  [] 74  Resp:  [16-20] 18  SpO2:  [90 %-96 %] 94 %  BP: ()/(55-77) 97/55     Weight: 98.9 kg (218 lb)  Body mass index is 36.28 kg/m².     SpO2: (!) 94 %       Physical Exam  Constitutional:       General: She is not in acute distress.     Appearance: Normal appearance. She is not ill-appearing, toxic-appearing or diaphoretic.   HENT:      Head: Normocephalic and atraumatic.      Nose: Nose normal.   Eyes:      Extraocular Movements: Extraocular movements intact.      Pupils: Pupils are equal, round, and reactive to light.   Cardiovascular:      Rate and Rhythm: Normal rate  and regular rhythm.      Heart sounds: No murmur heard.    No friction rub. No gallop.   Pulmonary:      Effort: Pulmonary effort is normal. No respiratory distress.      Breath sounds: Normal breath sounds. No wheezing or rales.   Abdominal:      General: Abdomen is flat. There is no distension.      Palpations: Abdomen is soft. There is no mass.      Tenderness: There is no abdominal tenderness.   Musculoskeletal:         General: No swelling. Normal range of motion.      Cervical back: Normal range of motion. No rigidity.      Right lower leg: No edema.      Left lower leg: No edema.   Skin:     General: Skin is warm and dry.      Coloration: Skin is not jaundiced.      Findings: No bruising.   Neurological:      General: No focal deficit present.      Mental Status: She is alert.      Cranial Nerves: No cranial nerve deficit.      Motor: No weakness.       Significant Labs: BMP:   Recent Labs   Lab 02/12/23  0446 02/13/23  0533   * 121*    138   K 4.1 4.2   CL 92* 91*   CO2 34* 38*   BUN 15 12   CREATININE 0.6 0.6   CALCIUM 9.0 9.4   MG 1.6 1.6      and CBC:   Recent Labs   Lab 02/12/23  0446 02/13/23  0533   WBC 20.34* 19.50*   HGB 8.5* 8.7*   HCT 28.1* 29.0*   * 601*         Significant Imaging: Reviewed     Assessment and Plan:     Atrial flutter  Ms. Ernandez is a 52-year-old woman with a past medical history significant for hypertension, hyperlipidemia, hypothyroidism, and obesity who was transferred from [facility] with complaints of hemoptysis. She was noted to have a solid mass in the superior aspect of the right lung, concerning for malignancy. She was noted to be in a likely atypical atrial flutter on ECG with no reported cardiac symptoms and stable hemodynamic parameters.      EP saw pt on 2/9/23 and it was determined that since she was actively bleeding with continued hemoptysis, she was unable to be initiated on oral anticoagulation. Cardioversion (either  electrical or chemical) deferred until she could be receive at least one month of uninterrupted oral anticoagulation. Plan was for rate control with Beta blocker.      EP re-consulted given pt continues to be in AFL with HR in 130s. Pt still without symptoms. Hemoptysis now imrpoved and pt noted to only have one episode yesterday,         Atrial flutter  -Review of ECG reveals CTI independent atrial flutter.   -Can cont Metoprolol 50 mg QID as BP tolerates   -If pt becomes hemodynamically unstable, would recommend immediate DCCV  -continue 250 mcg PO digoxin   -Restart AC when possible once hemoptysis improves     EP will sign off.   Pt to follow outpatient with Dr. Paredes ( in 3 months)          Flory Bell MD  Cardiac Electrophysiology  [facility] - Telemetry Stepdown

## 2023-02-13 NOTE — SUBJECTIVE & OBJECTIVE
Interval History: Rate controlled this am with HR 70-80; pt reports improvement in shortness of breath  Improving cough and states does occasionally have small amount of pinkish tinge to sputum, no overt bleeding    Review of Systems  Objective:     Vital Signs (Most Recent):  Temp: 97.7 °F (36.5 °C) (02/13/23 1231)  Pulse: 79 (02/13/23 1231)  Resp: 20 (02/13/23 1231)  BP: (!) 100/45 (02/13/23 1231)  SpO2: 95 % (02/13/23 1231)   Vital Signs (24h Range):  Temp:  [97 °F (36.1 °C)-98.9 °F (37.2 °C)] 97.7 °F (36.5 °C)  Pulse:  [] 79  Resp:  [16-20] 20  SpO2:  [94 %-96 %] 95 %  BP: ()/(45-77) 100/45     Weight: 98.9 kg (218 lb)  Body mass index is 36.28 kg/m².    Intake/Output Summary (Last 24 hours) at 2/13/2023 1352  Last data filed at 2/13/2023 0653  Gross per 24 hour   Intake 118 ml   Output 1550 ml   Net -1432 ml      Physical Exam  Constitutional:       General: She is not in acute distress.     Appearance: She is obese. She is not ill-appearing or diaphoretic.   HENT:      Nose:      Comments: Nasal cannula in place, no obvious bleeding in nares or mouth     Mouth/Throat:      Pharynx: Oropharynx is clear. No oropharyngeal exudate or posterior oropharyngeal erythema.      Comments: No obvious bleeding in mouth or throat  Cardiovascular:      Rate and Rhythm: Normal rate and regular rhythm.      Heart sounds: Normal heart sounds. No murmur heard.  Pulmonary:      Effort: Pulmonary effort is normal. No respiratory distress.      Breath sounds: Rhonchi present. No wheezing.   Abdominal:      General: Abdomen is flat. There is no distension.      Palpations: Abdomen is soft. There is no mass.      Tenderness: There is no abdominal tenderness. There is no guarding or rebound.   Musculoskeletal:         General: Normal range of motion.      Right lower leg: No edema.      Left lower leg: No edema.   Skin:     General: Skin is warm.      Coloration: Skin is not jaundiced.      Findings: No bruising.    Neurological:      Mental Status: She is alert and oriented to person, place, and time.       MELD-Na score: 8 at 2/13/2023  5:33 AM  MELD score: 8 at 2/13/2023  5:33 AM  Calculated from:  Serum Creatinine: 0.6 mg/dL (Using min of 1 mg/dL) at 2/13/2023  5:33 AM  Serum Sodium: 138 mmol/L (Using max of 137 mmol/L) at 2/13/2023  5:33 AM  Total Bilirubin: 0.4 mg/dL (Using min of 1 mg/dL) at 2/13/2023  5:33 AM  INR(ratio): 1.2 at 2/11/2023  1:40 AM  Age: 52 years    Significant Labs:  CBC:  Recent Labs   Lab 02/12/23  0446 02/13/23  0533   WBC 20.34* 19.50*   HGB 8.5* 8.7*   HCT 28.1* 29.0*   * 601*     CMP:  Recent Labs   Lab 02/12/23  0446 02/13/23  0533    138   K 4.1 4.2   CL 92* 91*   CO2 34* 38*   * 121*   BUN 15 12   CREATININE 0.6 0.6   CALCIUM 9.0 9.4   PROT 6.0 6.0   ALBUMIN 1.5* 1.6*   BILITOT 0.3 0.4   ALKPHOS 169* 156*   AST 91* 41*   ALT 97* 73*   ANIONGAP 11 9     PTINR:  No results for input(s): INR in the last 48 hours.    Significant Procedures:   Dobutamine Stress Test with Color Flow: No results found for this or any previous visit.

## 2023-02-13 NOTE — SUBJECTIVE & OBJECTIVE
Interval History: NAEON. Remains tachycardic. Denies any current dizziness, palpitations. Hemoptysis resolved thus far.    Review of Systems   Constitutional: Negative for fever and malaise/fatigue.   Eyes:  Negative for blurred vision and pain.   Cardiovascular:  Negative for chest pain, dyspnea on exertion, leg swelling, orthopnea, palpitations and paroxysmal nocturnal dyspnea.   Respiratory:  Negative for cough, shortness of breath, sputum production and wheezing.    Hematologic/Lymphatic: Negative for adenopathy and bleeding problem.   Skin:  Negative for rash.   Musculoskeletal:  Negative for back pain and neck pain.   Gastrointestinal:  Negative for abdominal pain, constipation, diarrhea, nausea and vomiting.   Genitourinary:  Negative for dysuria.   Neurological:  Negative for dizziness, headaches, light-headedness and weakness.   Objective:     Vital Signs (Most Recent):  Temp: 97.9 °F (36.6 °C) (02/12/23 1647)  Pulse: (!) 121 (02/12/23 1647)  Resp: 18 (02/12/23 1828)  BP: 110/63 (02/12/23 1647)  SpO2: (!) 94 % (02/12/23 1647)   Vital Signs (24h Range):  Temp:  [96 °F (35.6 °C)-97.9 °F (36.6 °C)] 97.9 °F (36.6 °C)  Pulse:  [] 121  Resp:  [18-33] 18  SpO2:  [90 %-100 %] 94 %  BP: ()/(57-78) 110/63     Weight: 98.9 kg (218 lb)  Body mass index is 36.28 kg/m².     SpO2: (!) 94 %       Physical Exam  Constitutional:       General: She is not in acute distress.     Appearance: Normal appearance. She is not ill-appearing, toxic-appearing or diaphoretic.   HENT:      Head: Normocephalic and atraumatic.      Nose: Nose normal.   Eyes:      Extraocular Movements: Extraocular movements intact.      Pupils: Pupils are equal, round, and reactive to light.   Cardiovascular:      Rate and Rhythm: Normal rate and regular rhythm.      Heart sounds: No murmur heard.    No friction rub. No gallop.   Pulmonary:      Effort: Pulmonary effort is normal. No respiratory distress.      Breath sounds: Normal breath  sounds. No wheezing or rales.   Abdominal:      General: Abdomen is flat. There is no distension.      Palpations: Abdomen is soft. There is no mass.      Tenderness: There is no abdominal tenderness.   Musculoskeletal:         General: No swelling. Normal range of motion.      Cervical back: Normal range of motion. No rigidity.      Right lower leg: No edema.      Left lower leg: No edema.   Skin:     General: Skin is warm and dry.      Coloration: Skin is not jaundiced.      Findings: No bruising.   Neurological:      General: No focal deficit present.      Mental Status: She is alert.      Cranial Nerves: No cranial nerve deficit.      Motor: No weakness.       Significant Labs: BMP:   Recent Labs   Lab 02/11/23  0140 02/12/23  0446   * 133*    137   K 3.8 4.1   CL 93* 92*   CO2 31* 34*   BUN 17 15   CREATININE 0.7 0.6   CALCIUM 9.2 9.0   MG 1.8 1.6      and CBC:   Recent Labs   Lab 02/11/23  0140 02/12/23  0446   WBC 20.67* 20.34*   HGB 8.2* 8.5*   HCT 25.7* 28.1*   * 608*         Significant Imaging: Reviewed

## 2023-02-13 NOTE — PROGRESS NOTES
[facility] - Telemetry Stepdown  Cardiac Electrophysiology  Progress Note    Admission Date: 2/6/2023  Code Status: Full Code   Attending Physician: Tyrell Polanco*   Expected Discharge Date: 2/13/2023  Principal Problem:Hemoptysis    Subjective:     Interval History: NAEON. Remains tachycardic. Denies any current dizziness, palpitations. Hemoptysis resolved thus far.    Review of Systems   Constitutional: Negative for fever and malaise/fatigue.   Eyes:  Negative for blurred vision and pain.   Cardiovascular:  Negative for chest pain, dyspnea on exertion, leg swelling, orthopnea, palpitations and paroxysmal nocturnal dyspnea.   Respiratory:  Negative for cough, shortness of breath, sputum production and wheezing.    Hematologic/Lymphatic: Negative for adenopathy and bleeding problem.   Skin:  Negative for rash.   Musculoskeletal:  Negative for back pain and neck pain.   Gastrointestinal:  Negative for abdominal pain, constipation, diarrhea, nausea and vomiting.   Genitourinary:  Negative for dysuria.   Neurological:  Negative for dizziness, headaches, light-headedness and weakness.   Objective:     Vital Signs (Most Recent):  Temp: 97.9 °F (36.6 °C) (02/12/23 1647)  Pulse: (!) 121 (02/12/23 1647)  Resp: 18 (02/12/23 1828)  BP: 110/63 (02/12/23 1647)  SpO2: (!) 94 % (02/12/23 1647)   Vital Signs (24h Range):  Temp:  [96 °F (35.6 °C)-97.9 °F (36.6 °C)] 97.9 °F (36.6 °C)  Pulse:  [] 121  Resp:  [18-33] 18  SpO2:  [90 %-100 %] 94 %  BP: ()/(57-78) 110/63     Weight: 98.9 kg (218 lb)  Body mass index is 36.28 kg/m².     SpO2: (!) 94 %       Physical Exam  Constitutional:       General: She is not in acute distress.     Appearance: Normal appearance. She is not ill-appearing, toxic-appearing or diaphoretic.   HENT:      Head: Normocephalic and atraumatic.      Nose: Nose normal.   Eyes:      Extraocular Movements: Extraocular movements intact.      Pupils: Pupils are equal, round, and reactive to  light.   Cardiovascular:      Rate and Rhythm: Normal rate and regular rhythm.      Heart sounds: No murmur heard.    No friction rub. No gallop.   Pulmonary:      Effort: Pulmonary effort is normal. No respiratory distress.      Breath sounds: Normal breath sounds. No wheezing or rales.   Abdominal:      General: Abdomen is flat. There is no distension.      Palpations: Abdomen is soft. There is no mass.      Tenderness: There is no abdominal tenderness.   Musculoskeletal:         General: No swelling. Normal range of motion.      Cervical back: Normal range of motion. No rigidity.      Right lower leg: No edema.      Left lower leg: No edema.   Skin:     General: Skin is warm and dry.      Coloration: Skin is not jaundiced.      Findings: No bruising.   Neurological:      General: No focal deficit present.      Mental Status: She is alert.      Cranial Nerves: No cranial nerve deficit.      Motor: No weakness.       Significant Labs: BMP:   Recent Labs   Lab 02/11/23  0140 02/12/23  0446   * 133*    137   K 3.8 4.1   CL 93* 92*   CO2 31* 34*   BUN 17 15   CREATININE 0.7 0.6   CALCIUM 9.2 9.0   MG 1.8 1.6      and CBC:   Recent Labs   Lab 02/11/23  0140 02/12/23  0446   WBC 20.67* 20.34*   HGB 8.2* 8.5*   HCT 25.7* 28.1*   * 608*         Significant Imaging: Reviewed     Assessment and Plan:     Atrial flutter  Ms. Ernandez is a 52-year-old woman with a past medical history significant for hypertension, hyperlipidemia, hypothyroidism, and obesity who was transferred from [facility] with complaints of hemoptysis. She was noted to have a solid mass in the superior aspect of the right lung, concerning for malignancy. She was noted to be in a likely atypical atrial flutter on ECG with no reported cardiac symptoms and stable hemodynamic parameters.      EP saw pt on 2/9/23 and it was determined that since she was actively bleeding with continued hemoptysis, she was unable to be  initiated on oral anticoagulation. Cardioversion (either electrical or chemical) deferred until she could be receive at least one month of uninterrupted oral anticoagulation. Plan was for rate control with Beta blocker.      EP re-consulted given pt continues to be in AFL with HR in 130s. Pt still without symptoms. Hemoptysis now imrpoved and pt noted to only have one episode yesterday,         Atrial flutter  -Review of ECG reveals CTI independent atrial flutter.   -Can increase BB to Metoprolol 50 mg QID as BP tolerates   -If pt becomes hemodynamically unstable, would recommend immediate DCCV  -continue 250 mcg PO digoxin  -Restart AC when possible once hemoptysis improves           Flory Bell MD  Cardiac Electrophysiology  [facility] - Telemetry Stepdown

## 2023-02-13 NOTE — SUBJECTIVE & OBJECTIVE
Interval History: NAEON. Pt with atrial flutter. Rate now controlled.     Review of Systems   Constitutional: Negative for fever and malaise/fatigue.   Eyes:  Negative for blurred vision and pain.   Cardiovascular:  Negative for chest pain, dyspnea on exertion, leg swelling, orthopnea, palpitations and paroxysmal nocturnal dyspnea.   Respiratory:  Negative for cough, shortness of breath, sputum production and wheezing.    Hematologic/Lymphatic: Negative for adenopathy and bleeding problem.   Skin:  Negative for rash.   Musculoskeletal:  Negative for back pain and neck pain.   Gastrointestinal:  Negative for abdominal pain, constipation, diarrhea, nausea and vomiting.   Genitourinary:  Negative for dysuria.   Neurological:  Negative for dizziness, headaches, light-headedness and weakness.   Objective:     Vital Signs (Most Recent):  Temp: 98.9 °F (37.2 °C) (02/13/23 0326)  Pulse: 74 (02/13/23 0747)  Resp: 18 (02/13/23 0659)  BP: (!) 97/55 (02/13/23 0058)  SpO2: (!) 94 % (02/13/23 0256)   Vital Signs (24h Range):  Temp:  [97 °F (36.1 °C)-98.9 °F (37.2 °C)] 98.9 °F (37.2 °C)  Pulse:  [] 74  Resp:  [16-20] 18  SpO2:  [90 %-96 %] 94 %  BP: ()/(55-77) 97/55     Weight: 98.9 kg (218 lb)  Body mass index is 36.28 kg/m².     SpO2: (!) 94 %       Physical Exam  Constitutional:       General: She is not in acute distress.     Appearance: Normal appearance. She is not ill-appearing, toxic-appearing or diaphoretic.   HENT:      Head: Normocephalic and atraumatic.      Nose: Nose normal.   Eyes:      Extraocular Movements: Extraocular movements intact.      Pupils: Pupils are equal, round, and reactive to light.   Cardiovascular:      Rate and Rhythm: Normal rate and regular rhythm.      Heart sounds: No murmur heard.    No friction rub. No gallop.   Pulmonary:      Effort: Pulmonary effort is normal. No respiratory distress.      Breath sounds: Normal breath sounds. No wheezing or rales.   Abdominal:      General:  Abdomen is flat. There is no distension.      Palpations: Abdomen is soft. There is no mass.      Tenderness: There is no abdominal tenderness.   Musculoskeletal:         General: No swelling. Normal range of motion.      Cervical back: Normal range of motion. No rigidity.      Right lower leg: No edema.      Left lower leg: No edema.   Skin:     General: Skin is warm and dry.      Coloration: Skin is not jaundiced.      Findings: No bruising.   Neurological:      General: No focal deficit present.      Mental Status: She is alert.      Cranial Nerves: No cranial nerve deficit.      Motor: No weakness.       Significant Labs: BMP:   Recent Labs   Lab 02/12/23  0446 02/13/23  0533   * 121*    138   K 4.1 4.2   CL 92* 91*   CO2 34* 38*   BUN 15 12   CREATININE 0.6 0.6   CALCIUM 9.0 9.4   MG 1.6 1.6      and CBC:   Recent Labs   Lab 02/12/23  0446 02/13/23  0533   WBC 20.34* 19.50*   HGB 8.5* 8.7*   HCT 28.1* 29.0*   * 601*         Significant Imaging: Reviewed

## 2023-02-13 NOTE — PROGRESS NOTES
Pharmacokinetic Assessment Follow Up: IV Vancomycin    Vancomycin serum concentration assessment(s):    The trough level was drawn correctly and can be used to guide therapy at this time. The measurement is above the desired definitive target range of 15 to 20 mcg/mL.    Vancomycin Regimen Plan:    Change regimen to Vancomycin 1250 mg IV every 12 hours with next serum trough concentration measured at 1800 prior to 3rd dose on 2/14    Drug levels (last 3 results):  Recent Labs   Lab Result Units 02/13/23  0533   Vancomycin-Trough ug/mL 21.9       Pharmacy will continue to follow and monitor vancomycin.    Please contact pharmacy at extension [phone] for questions regarding this assessment.    Thank you for the consult,   Christina Lennon       Patient brief summary:  Skyla Ernandez is a 52 y.o. female initiated on antimicrobial therapy with IV Vancomycin for treatment of lower respiratory infection      Drug Allergies:   Review of patient's allergies indicates:   Allergen Reactions    Penicillins Hives       Actual Body Weight:   98.9 kg    Renal Function:   Estimated Creatinine Clearance: 124.9 mL/min (based on SCr of 0.6 mg/dL).,     Dialysis Method (if applicable):  N/A    CBC (last 72 hours):  Recent Labs   Lab Result Units 02/11/23  0140 02/12/23 0446 02/13/23  0533   WBC K/uL 20.67* 20.34* 19.50*   Hemoglobin g/dL 8.2* 8.5* 8.7*   Hematocrit % 25.7* 28.1* 29.0*   Platelets K/uL 525* 608* 601*   Gran % % 79.1* 80.3* 81.2*   Lymph % % 9.0* 7.9* 7.4*   Mono % % 7.4 6.8 6.7   Eosinophil % % 2.0 3.0 2.8   Basophil % % 0.4 0.4 0.4   Differential Method  Automated Automated Automated       Metabolic Panel (last 72 hours):  Recent Labs   Lab Result Units 02/11/23  0140 02/12/23 0446 02/13/23  0533   Sodium mmol/L 137 137 138   Potassium mmol/L 3.8 4.1 4.2   Chloride mmol/L 93* 92* 91*   CO2 mmol/L 31* 34* 38*   Glucose mg/dL 159* 133* 121*   BUN mg/dL 17 15 12   Creatinine mg/dL 0.7 0.6 0.6   Albumin g/dL 1.5* 1.5* 1.6*    Total Bilirubin mg/dL 0.3 0.3 0.4   Alkaline Phosphatase U/L 184* 169* 156*   AST U/L 99* 91* 41*   ALT U/L 75* 97* 73*   Magnesium mg/dL 1.8 1.6 1.6   Phosphorus mg/dL 4.0 3.6 4.0       Vancomycin Administrations:  vancomycin given in the last 96 hours                     vancomycin 1,500 mg in dextrose 5 % (D5W) 250 mL IVPB (Vial-Mate) (mg) 1,500 mg New Bag 02/13/23 0653     1,500 mg New Bag 02/12/23 1824     1,500 mg New Bag  0643     1,500 mg New Bag 02/11/23 1830     1,500 mg New Bag  0617     1,500 mg New Bag 02/10/23 1836     1,500 mg New Bag  0651     1,500 mg New Bag 02/09/23 2000                    Microbiologic Results:  Microbiology Results (last 7 days)       Procedure Component Value Units Date/Time    Blood culture [[id number]] Collected: 02/06/23 2050    Order Status: Completed Specimen: Blood from Peripheral, Forearm, Left Updated: 02/11/23 2322     Blood Culture, Routine No growth after 5 days.    Blood culture [[id number]] Collected: 02/06/23 2050    Order Status: Completed Specimen: Blood from Peripheral, Forearm, Left Updated: 02/11/23 2322     Blood Culture, Routine No growth after 5 days.    Culture, Respiratory with Gram Stain [[id number]] Collected: 02/07/23 1759    Order Status: Completed Specimen: Respiratory from Bronchial [place] Updated: 02/11/23 0947     Respiratory Culture Normal respiratory silvia      No S aureus or Pseudomonas isolated.     Gram Stain (Respiratory) <10 epithelial cells per low power field.     Gram Stain (Respiratory) Rare WBC's     Gram Stain (Respiratory) Rare Gram positive cocci    AFB Culture & Smear [[id number]] Collected: 02/07/23 1800    Order Status: Completed Specimen: Body Fluid from Lung, RUL Updated: 02/08/23 2127     AFB Culture & Smear Culture in progress     AFB CULTURE STAIN No acid fast bacilli seen.    Narrative:      Bronchial Brush    Fungus culture [[id number]] Collected: 02/07/23 1800    Order Status: Completed Specimen: Body Fluid from Lung, RUL  Updated: 02/08/23 1016     Fungus (Mycology) Culture Culture in progress    Narrative:      Bronchial Brush    AFB stain [[id number]] Collected: 02/07/23 1800    Order Status: Completed Specimen: Body Fluid from Lung, RUL Updated: 02/07/23 2242     Direct Acid Fast No acid fast bacilli seen.    Narrative:      Bronchial Brush    KOH prep [[id number]] Collected: 02/07/23 1800    Order Status: Completed Specimen: Body Fluid from Lung, RUL Updated: 02/07/23 2106     KOH Prep No yeast or fungal elements seen    Narrative:      Bronchial Brush    Culture, Respiratory [[id number]] Collected: 02/07/23 1759    Order Status: Completed Specimen: Respiratory from Bronchial [place] Updated: 02/07/23 2057    Narrative:      Bronchial Brush    Gram stain [[id number]] Collected: 02/07/23 1800    Order Status: Canceled Specimen: Body Fluid from Lung, RUL

## 2023-02-13 NOTE — ASSESSMENT & PLAN NOTE
Ms. Ernandez is a 52-year-old woman with a past medical history significant for hypertension, hyperlipidemia, hypothyroidism, and obesity who was transferred from [facility] with complaints of hemoptysis. She was noted to have a solid mass in the superior aspect of the right lung, concerning for malignancy. She was noted to be in a likely atypical atrial flutter on ECG with no reported cardiac symptoms and stable hemodynamic parameters.      EP saw pt on 2/9/23 and it was determined that since she was actively bleeding with continued hemoptysis, she was unable to be initiated on oral anticoagulation. Cardioversion (either electrical or chemical) deferred until she could be receive at least one month of uninterrupted oral anticoagulation. Plan was for rate control with Beta blocker.      EP re-consulted given pt continues to be in AFL with HR in 130s. Pt still without symptoms. Hemoptysis now imrpoved and pt noted to only have one episode yesterday,         Atrial flutter  -Review of ECG reveals CTI independent atrial flutter.   -Can increase BB to Metoprolol 50 mg QID as BP tolerates   -If pt becomes hemodynamically unstable, would recommend immediate DCCV  -continue 250 mcg PO digoxin tomorrow   -Restart AC when possible once hemoptysis improves

## 2023-02-13 NOTE — CARE UPDATE
"RAPID RESPONSE NURSE CHART REVIEW       Chart Reviewed: 2/11/2023, 2000    MRN: [id number]  Bed: 8099/8099 A    Dx: Hemoptysis    Skyla Ernandez has a past medical history of Hypertension, Mixed hyperlipidemia, and Thyroid disease.    Last VS: BP (!) 97/55   Pulse 75   Temp 98.9 °F (37.2 °C)   Resp 16   Ht 5' 5" (1.651 m)   Wt 98.9 kg (218 lb)   SpO2 (!) 94%   Breastfeeding No   BMI 36.28 kg/m²     24H Vital Sign Range:  Temp:  [97 °F (36.1 °C)-98.9 °F (37.2 °C)]   Pulse:  []   Resp:  [16-20]   BP: ()/(55-77)   SpO2:  [90 %-98 %]     Level of Consciousness (AVPU): alert    Recent Labs     02/10/23  0637 02/11/23  0140 02/12/23  0446   WBC 22.69* 20.67* 20.34*   HGB 8.0* 8.2* 8.5*   HCT 25.7* 25.7* 28.1*   * 525* 608*       Recent Labs     02/10/23  0637 02/11/23  0140 02/12/23  0446    137 137   K 4.0 3.8 4.1   CL 96 93* 92*   CO2 29 31* 34*   CREATININE 0.6 0.7 0.6   * 159* 133*   PHOS 3.4 4.0 3.6   MG 1.7 1.8 1.6        No results for input(s): PH, PCO2, PO2, HCO3, POCSATURATED, BE in the last 72 hours.     OXYGEN:  Flow (L/min): 3          MEWS score: 2    Chart reviewed. No concerns noted at this time. Instructed to call [phone] for concerns or assistance.    Barney Patel RN       "

## 2023-02-13 NOTE — PLAN OF CARE
Problem: Physical Therapy  Goal: Physical Therapy Goal  Description: Goals to be met by: 23     Patient will increase functional independence with mobility by performin. Supine to sit with Modified Blair  2. Sit to supine with Modified Blair  3. Sit to stand transfer with Modified Blair  4. Bed to chair transfer with Modified Blair using Rolling Walker or without assistive device as appropriate  5. Gait  x 200 feet with Supervision using Rolling Walker or without Assistive device, as needed   6. Ascend/descend 4 stair with bilateral Handrails Supervision using No Assistive Device.   7. Lower extremity exercise program x15 reps per handout, with assistance as needed    PT alena 2023

## 2023-02-13 NOTE — ASSESSMENT & PLAN NOTE
Ms. Ernandez is a 52-year-old woman with a past medical history significant for hypertension, hyperlipidemia, hypothyroidism, and obesity who was transferred from [facility] with complaints of hemoptysis. She was noted to have a solid mass in the superior aspect of the right lung, concerning for malignancy. She was noted to be in a likely atypical atrial flutter on ECG with no reported cardiac symptoms and stable hemodynamic parameters.      EP saw pt on 2/9/23 and it was determined that since she was actively bleeding with continued hemoptysis, she was unable to be initiated on oral anticoagulation. Cardioversion (either electrical or chemical) deferred until she could be receive at least one month of uninterrupted oral anticoagulation. Plan was for rate control with Beta blocker.      EP re-consulted given pt continues to be in AFL with HR in 130s. Pt still without symptoms. Hemoptysis now imrpoved and pt noted to only have one episode yesterday,         Atrial flutter  -Review of ECG reveals CTI independent atrial flutter.   -Can cont Metoprolol 50 mg QID as BP tolerates   -If pt becomes hemodynamically unstable, would recommend immediate DCCV  -continue 250 mcg PO digoxin   -Restart AC when possible once hemoptysis improves     EP will sign off.   Pt to follow outpatient with Dr. Paredes ( in 3 months)

## 2023-02-13 NOTE — PT/OT/SLP EVAL
Physical Therapy Evaluation    Patient Name:  Skyla Ernandez   MRN:  [id number]    Recommendations:     Discharge Recommendations: home health PT   Discharge Equipment Recommendations: walker, rolling   Barriers to discharge: None    Assessment:     Skyla Ernandez is a 52 y.o. female admitted with a medical diagnosis of Hemoptysis.  She presents with the following impairments/functional limitations: impaired endurance, gait instability, impaired balance . Patient participated well in therapy. She ambulated 30 feet w/ HHA/ CGA w/ unsteady gait. Then ambulated 110 feet w/ RW and SBA w/ improved balance. She ascended/descended 7 steps w/ single HR with CGA. She used oxygen at 2 LPM throughout session..    Rehab Prognosis: Good; patient would benefit from acute skilled PT services to address these deficits and reach maximum level of function.    Recent Surgery: * No surgery found *      Plan:     During this hospitalization, patient to be seen 3 x/week to address the identified rehab impairments via gait training, therapeutic activities, therapeutic exercises and progress toward the following goals:    Plan of Care Expires:  03/15/23    Subjective     Chief Complaint: tired of the bed  Patient/Family Comments/goals: return to her home in RV and to PLOF  Pain/Comfort:  Pain Rating 1: 0/10  Pain Rating Post-Intervention 1: 0/10    Patients cultural, spiritual, Yazidism conflicts given the current situation: no    Living Environment:  Patient lives in RW w/ spouse and 3 dogs. The RV has deck and stairs - 5 JAYA w/ BHR. (They bring the deck along in a trailer whenever they relocate and reset it up. The dogs have a pen.   Prior to admission, patients level of function was independent.  Equipment used at home: shower chair.  DME owned (not currently used): none.  Upon discharge, patient will have assistance from spouse.    Objective:     Communicated with nurse prior to session.  Patient found HOB elevated with pulse ox  (continuous), PureWick, telemetry, oxygen  upon PT entry to room.    General Precautions: Standard, fall  Orthopedic Precautions:N/A   Braces: N/A  Respiratory Status: Nasal cannula, flow 2 L/min    Exams:  Cognitive Exam:  Patient is oriented to Person, Place, Time, and Situation  Gross Motor Coordination:  WFL  RLE ROM: WFL  RLE Strength: WFL  LLE ROM: WFL  LLE Strength: WFL    Functional Mobility:  Bed Mobility:     Supine to Sit: stand by assistance  Transfers:     Sit to Stand:  contact guard assistance with no AD and from EOB  Sit to stand from chair w/ RW and CGA; sit to stand from chair w/o AD w/ SBA  Bed to Chair: contact guard assistance with  rolling walker  using  Stand Pivot  Gait: w/o AD w/ CG/HHA 30 feet, forward bending posture and unsteady, reaching for furniture, wall. After seated rest break, ambulates w/ RW and CGA/ feet w/ chair follow. After seated rest break, ambulates 5 feet to steps w/ HHA,   Ascend/descend 7 steps w/ single HR and CGA (spouse assisting and PT managing oxygen and providing instructions). Spouse demonstrates ability to assist patient  Balance: sits EOB w/ mod(I) stands w/ RW and SBA;      AM-PAC 6 CLICK MOBILITY  Total Score:19       Treatment & Education:  Patient and spouse educated in PT POC, gait and trf tech, stair technique; rec of RW for d/c home.     Patient left up in chair with all lines intact, call button in reach, and spouse present.    GOALS:   Multidisciplinary Problems       Physical Therapy Goals          Problem: Physical Therapy    Goal Priority Disciplines Outcome Goal Variances Interventions   Physical Therapy Goal     PT, PT/OT Ongoing, Progressing     Description: Goals to be met by: 23     Patient will increase functional independence with mobility by performin. Supine to sit with Modified Fisher  2. Sit to supine with Modified Fisher  3. Sit to stand transfer with Modified Fisher  4. Bed to chair transfer with  Modified Izard using Rolling Walker or without assistive device as appropriate  5. Gait  x 200 feet with Supervision using Rolling Walker or without Assistive device, as needed   6. Ascend/descend 4 stair with bilateral Handrails Supervision using No Assistive Device.   7. Lower extremity exercise program x15 reps per handout, with assistance as needed                         History:     Past Medical History:   Diagnosis Date    Hypertension     Mixed hyperlipidemia     Thyroid disease        Past Surgical History:   Procedure Laterality Date    ABDOMINAL SURGERY      TONSILLECTOMY         Time Tracking:     PT Received On: 02/13/23  PT Start Time: 1147     PT Stop Time: 1235  PT Total Time (min): 48 min     Billable Minutes: Evaluation 10, Gait Training 30, and Therapeutic Activity 8      02/13/2023

## 2023-02-13 NOTE — PROGRESS NOTES
"[facility] - Telemetry [facility] Medicine  Progress Note    Patient Name: Skyla Ernandez  MRN: [id number]  Patient Class: IP- Inpatient   Admission Date: 2/6/2023  Length of Stay: 7 days  Attending Physician: Tyrell Polanco*  Primary Care Provider: Primary Doctor No        Subjective:     Principal Problem:Hemoptysis        HPI:  Per ICU hand off : " 53 yo female with hypertension, hyperlipidemia, and thyroid disease who presented to [facility] Emergency Department this morning for hemoptysis. She reports 3 weeks of cough in which she first noticed blood tinged sputum 2 weeks ago. She reports that the cough has been productive of whitish brown sputum which became darker and eventually appeared dark red. The cough often interferes with sleep. She has also experienced shortness of breath over the past couple of days. She endorses sore throat, RUSSELL, and 2-3 pillow orthopnea. This morning she coughed up over a cup of bright red blood and decided to present to the ED. She denies any chronic tobacco history apart from occasional smoking cigarettes in her late teens. She denies piror homelessness, incarceration, close contacts with TB, or recent travel. She denies having a history of asthma, COPD, cirrhosis or nose bleeds but does report history of scoliosis.     Workup at the [facility] ED includes leukocytosis to 35K, hemoglobin of 6.8 now s/p 1 unit of pRBC's, thrombocytosis to 882, NT-proBNP 1640, troponin negative, and lactic acid 1.8. CXR diffuse patchy opacities scattered throughout the right middle and right lower lung zone which may be seen with pneumonia, aspiration, or edema. CTA of the chest showed no evidence of pulmonary embolism. Solid mass in the superior aspect of the right lung concerning for malignancy. Mass is partially necrotic with air bubbles peripherally. Mass abuts the pericardium and mediastinum. There is associated atelectasis/consolidation of the right middle " "lobe. Patchy opacities in the right lower lobe as well as the left lung which is nonspecific but may represent atelectasis, infectious process, or hemorrhage. She was started on levofloxacin. She was noted to be tachycardic to 100's with soft pressures 87//73 and stable oxygenation on 2LNC.     Patient [place] [facility] [place] for higher level of care."      Overview/Hospital Course:  Per ICU hand off: Patient admitted to MICU for further evaluation and management of hemoptysis. Hb of 6.7, received a second unit of pRBC's as patient continued to have cough with blood tinged sputum. On levofloxacin and vancomycin given leukocytosis, fever, and concerned for pneumonia. Patient remains HDS and satting well on 4L NC. Hypoxic respiratory failure likely 2/2 pneumonia vs hemoptysis vs lung mass. S/p bronchoscopy on 2/7 with BAL performed which was significant for grossly normal airways apart from sticky thick white mucus. Results from BAL unremarkable so far and cultures NGTD. Patient continues to be HDS and satting well on LFNC. Hb stable without any further hemoptysis for over 24 hours. Patient is medically ready for step down to hospital medicine.  2/9: Overnight events of hemoptysis noted.  Patient reports  soreness in lateral chest wall muscles from cough.  Heart rate noted in 130s to 150s.  EKG consistent with a flutter.  Starting metoprolol 12.5 b.i.d..  Holding Eliquis in the setting of hemoptysis. Consulted EP    2/10: Received 1 u blood yesterday with improvement in Hb. Denies further hemoptysis. SOB improved. Weaning oxygen. Afebrile  HR persistently elevated aflutter, increasing metoprolol.  Denies chest pain, denies palpitations.       Interval History: Rate controlled this am with HR 70-80; pt reports improvement in shortness of breath  Improving cough and states does occasionally have small amount of pinkish tinge to sputum, no overt bleeding    Review of Systems  Objective: "     Vital Signs (Most Recent):  Temp: 97.7 °F (36.5 °C) (02/13/23 1231)  Pulse: 79 (02/13/23 1231)  Resp: 20 (02/13/23 1231)  BP: (!) 100/45 (02/13/23 1231)  SpO2: 95 % (02/13/23 1231)   Vital Signs (24h Range):  Temp:  [97 °F (36.1 °C)-98.9 °F (37.2 °C)] 97.7 °F (36.5 °C)  Pulse:  [] 79  Resp:  [16-20] 20  SpO2:  [94 %-96 %] 95 %  BP: ()/(45-77) 100/45     Weight: 98.9 kg (218 lb)  Body mass index is 36.28 kg/m².    Intake/Output Summary (Last 24 hours) at 2/13/2023 1352  Last data filed at 2/13/2023 0653  Gross per 24 hour   Intake 118 ml   Output 1550 ml   Net -1432 ml      Physical Exam  Constitutional:       General: She is not in acute distress.     Appearance: She is obese. She is not ill-appearing or diaphoretic.   HENT:      Nose:      Comments: Nasal cannula in place, no obvious bleeding in nares or mouth     Mouth/Throat:      Pharynx: Oropharynx is clear. No oropharyngeal exudate or posterior oropharyngeal erythema.      Comments: No obvious bleeding in mouth or throat  Cardiovascular:      Rate and Rhythm: Normal rate and regular rhythm.      Heart sounds: Normal heart sounds. No murmur heard.  Pulmonary:      Effort: Pulmonary effort is normal. No respiratory distress.      Breath sounds: Rhonchi present. No wheezing.   Abdominal:      General: Abdomen is flat. There is no distension.      Palpations: Abdomen is soft. There is no mass.      Tenderness: There is no abdominal tenderness. There is no guarding or rebound.   Musculoskeletal:         General: Normal range of motion.      Right lower leg: No edema.      Left lower leg: No edema.   Skin:     General: Skin is warm.      Coloration: Skin is not jaundiced.      Findings: No bruising.   Neurological:      Mental Status: She is alert and oriented to person, place, and time.       MELD-Na score: 8 at 2/13/2023  5:33 AM  MELD score: 8 at 2/13/2023  5:33 AM  Calculated from:  Serum Creatinine: 0.6 mg/dL (Using min of 1 mg/dL) at 2/13/2023   5:33 AM  Serum Sodium: 138 mmol/L (Using max of 137 mmol/L) at 2/13/2023  5:33 AM  Total Bilirubin: 0.4 mg/dL (Using min of 1 mg/dL) at 2/13/2023  5:33 AM  INR(ratio): 1.2 at 2/11/2023  1:40 AM  Age: 52 years    Significant Labs:  CBC:  Recent Labs   Lab 02/12/23  0446 02/13/23  0533   WBC 20.34* 19.50*   HGB 8.5* 8.7*   HCT 28.1* 29.0*   * 601*     CMP:  Recent Labs   Lab 02/12/23  0446 02/13/23  0533    138   K 4.1 4.2   CL 92* 91*   CO2 34* 38*   * 121*   BUN 15 12   CREATININE 0.6 0.6   CALCIUM 9.0 9.4   PROT 6.0 6.0   ALBUMIN 1.5* 1.6*   BILITOT 0.3 0.4   ALKPHOS 169* 156*   AST 91* 41*   ALT 97* 73*   ANIONGAP 11 9     PTINR:  No results for input(s): INR in the last 48 hours.    Significant Procedures:   Dobutamine Stress Test with Color Flow: No results found for this or any previous visit.      Assessment/Plan:      * Hemoptysis  Patient with cough for 3 weeks and coughed up a cup of blood prior to presentation  Not on blood thinners, non-tobacco smoker, heavy marijuana smoker, no lung disease history   CTA at outside hospital negative for PE but there was a solid mass in the superior aspect of the right lung concerning for malignancy.  Mass is partially necrotic with air bubbles peripherally.    At outside hospital Hg 6.8, was given 1 unit pRBC   Was also given nebulized TXA at outside hospital   2/8: Hemoglobin remaining stable with resolution of hemoptysis    - Discontinue nebulized TXA  - Monitor oxygen   - s/p bronch on 2/7, see procedure note  - Consult IR for possible emoblization if uncontrolled bleeding   - prn codeine-promethazine for cough  - Trend CBC   - monitor coags      Mass of upper lobe of right lung  CTA at outside hospital negative for PE but there was a solid mass in the superior aspect of the right lung concerning for malignancy.  Mass is partially necrotic with air bubbles peripherally.    WBC 35 and lacate normal at outside hospital      - On Vanc/levofloxacin   -  FU cultures   - s/p bronch on 2/7, no mass visualized in airways to biopsy but BAL performed with results pending  - per pulm recommend full treatment with antibiotics and a follow up in pulmonary with repeat CT Scan in a few weeks time to determine which aspects of the abnormalities visualized on CT are pneumonia vs. Potential mass    Acute systolic heart failure  Results for orders placed during the hospital encounter of 02/06/23  On losartan, BB  Trial Lasix    Echo    Interpretation Summary  · Atrial fibrillation ( or Flutter?) observed.  · The left ventricle is mildly enlarged with moderately decreased systolic function.  · The estimated ejection fraction is 35%.  · There is abnormal septal wall motion.  · Mild left atrial enlargement.  · Mild right ventricular enlargement with moderately to severely reduced right ventricular systolic function.  · Elevated central venous pressure (15 mmHg).  · Mild to moderate tricuspid regurgitation.  · Mild pulmonic regurgitation.  · The estimated PA systolic pressure is 56 mmHg.  · There is moderate pulmonary hypertension.  · Small posterior pericardial effusion. Trivial under the RA.        Atrial flutter  On 02/09, HR noted in 130s to 150s.  Patient remained asymptomatic, denied palpitations, chest pain, shortness of breath, lightheadedness or dizziness.  EKG obtained, consistent with a flutter with RVR.  Consulted EP; Started metoprolol 12.5 b.i.d, uptitrating. Holding anticoagulation given hemoptysis.  2/11 loaded with dig and started PO  Increasing metoprolol to 50 mg qid      Anxiety and depression  - continue home bupropion, amitriptyline, clonazepam, and duloxetine.        Neuropathy  - home gabapentin, amitriptyline, duloxetine       Acute hypoxemic respiratory failure  Likely due to hemoptysis, lung mass, and possible pneumonia      - Vanc/levofloxacin   - CBC daily   - Supplemental O2 as needed     Thyroid disease  Continue home synthroid       Anemia  Hg 6.8 at  outside hospital, was given 1 unit pRBC   Here with hemoptysis   Repeat Hb noted at 6.7, received a second unit of pRBC's  2/8: hemoptysis resolved and hemoglobin stable     - CBC daily   - FU coags   - Transfuse if Hg <7, platelets <10, or platelets <50 and actively bleeding       VTE Risk Mitigation (From admission, onward)         Ordered     Reason for No Pharmacological VTE Prophylaxis  Once        Question:  Reasons:  Answer:  Active Bleeding    02/06/23 1846     IP VTE HIGH RISK PATIENT  Once         02/06/23 1846     Place sequential compression device  Until discontinued         02/06/23 1846                Discharge Planning   MICHELE: 2/13/2023     Code Status: Full Code   Is the patient medically ready for discharge?:     Reason for patient still in hospital (select all that apply): Patient trending condition and Treatment  Discharge Plan A: Home with family        Tyrell Young MD  Department of Hospital Medicine   [facility] - Telemetry Stepdown

## 2023-02-13 NOTE — NURSING
End of shift note    AAOx4  3L NC  Up to chair- tolerated well  Uneventful shift  VSS  NADN- safety checks performed  Call light in reach

## 2023-02-14 LAB
ALBUMIN SERPL BCP-MCNC: 1.6 G/DL (ref 3.5–5.2)
ALP SERPL-CCNC: 146 U/L (ref 55–135)
ALT SERPL W/O P-5'-P-CCNC: 58 U/L (ref 10–44)
ANION GAP SERPL CALC-SCNC: 11 MMOL/L (ref 8–16)
AST SERPL-CCNC: 31 U/L (ref 10–40)
BASOPHILS # BLD AUTO: 0.06 K/UL (ref 0–0.2)
BASOPHILS # BLD AUTO: 0.06 K/UL (ref 0–0.2)
BASOPHILS # BLD AUTO: 0.08 K/UL (ref 0–0.2)
BASOPHILS NFR BLD: 0.3 % (ref 0–1.9)
BASOPHILS NFR BLD: 0.4 % (ref 0–1.9)
BASOPHILS NFR BLD: 0.4 % (ref 0–1.9)
BILIRUB SERPL-MCNC: 0.4 MG/DL (ref 0.1–1)
BUN SERPL-MCNC: 15 MG/DL (ref 6–20)
CALCIUM SERPL-MCNC: 9.1 MG/DL (ref 8.7–10.5)
CHLORIDE SERPL-SCNC: 91 MMOL/L (ref 95–110)
CO2 SERPL-SCNC: 34 MMOL/L (ref 23–29)
CREAT SERPL-MCNC: 0.7 MG/DL (ref 0.5–1.4)
DIFFERENTIAL METHOD: ABNORMAL
EOSINOPHIL # BLD AUTO: 0.4 K/UL (ref 0–0.5)
EOSINOPHIL # BLD AUTO: 0.5 K/UL (ref 0–0.5)
EOSINOPHIL # BLD AUTO: 0.5 K/UL (ref 0–0.5)
EOSINOPHIL NFR BLD: 2.3 % (ref 0–8)
EOSINOPHIL NFR BLD: 2.6 % (ref 0–8)
EOSINOPHIL NFR BLD: 2.9 % (ref 0–8)
ERYTHROCYTE [DISTWIDTH] IN BLOOD BY AUTOMATED COUNT: 17.3 % (ref 11.5–14.5)
ERYTHROCYTE [DISTWIDTH] IN BLOOD BY AUTOMATED COUNT: 17.4 % (ref 11.5–14.5)
ERYTHROCYTE [DISTWIDTH] IN BLOOD BY AUTOMATED COUNT: 17.5 % (ref 11.5–14.5)
EST. GFR  (NO RACE VARIABLE): >60 ML/MIN/1.73 M^2
GLUCOSE SERPL-MCNC: 120 MG/DL (ref 70–110)
HCT VFR BLD AUTO: 25.2 % (ref 37–48.5)
HCT VFR BLD AUTO: 27.3 % (ref 37–48.5)
HCT VFR BLD AUTO: 27.4 % (ref 37–48.5)
HGB BLD-MCNC: 7.5 G/DL (ref 12–16)
HGB BLD-MCNC: 8.1 G/DL (ref 12–16)
HGB BLD-MCNC: 8.4 G/DL (ref 12–16)
IMM GRANULOCYTES # BLD AUTO: 0.22 K/UL (ref 0–0.04)
IMM GRANULOCYTES # BLD AUTO: 0.27 K/UL (ref 0–0.04)
IMM GRANULOCYTES # BLD AUTO: 0.28 K/UL (ref 0–0.04)
IMM GRANULOCYTES NFR BLD AUTO: 1.2 % (ref 0–0.5)
IMM GRANULOCYTES NFR BLD AUTO: 1.3 % (ref 0–0.5)
IMM GRANULOCYTES NFR BLD AUTO: 1.7 % (ref 0–0.5)
LYMPHOCYTES # BLD AUTO: 1.5 K/UL (ref 1–4.8)
LYMPHOCYTES # BLD AUTO: 1.5 K/UL (ref 1–4.8)
LYMPHOCYTES # BLD AUTO: 2 K/UL (ref 1–4.8)
LYMPHOCYTES NFR BLD: 8.1 % (ref 18–48)
LYMPHOCYTES NFR BLD: 9.1 % (ref 18–48)
LYMPHOCYTES NFR BLD: 9.6 % (ref 18–48)
MAGNESIUM SERPL-MCNC: 1.6 MG/DL (ref 1.6–2.6)
MCH RBC QN AUTO: 27.7 PG (ref 27–31)
MCH RBC QN AUTO: 28 PG (ref 27–31)
MCH RBC QN AUTO: 28.4 PG (ref 27–31)
MCHC RBC AUTO-ENTMCNC: 29.7 G/DL (ref 32–36)
MCHC RBC AUTO-ENTMCNC: 29.8 G/DL (ref 32–36)
MCHC RBC AUTO-ENTMCNC: 30.7 G/DL (ref 32–36)
MCV RBC AUTO: 93 FL (ref 82–98)
MCV RBC AUTO: 94 FL (ref 82–98)
MCV RBC AUTO: 94 FL (ref 82–98)
MONOCYTES # BLD AUTO: 1 K/UL (ref 0.3–1)
MONOCYTES # BLD AUTO: 1.2 K/UL (ref 0.3–1)
MONOCYTES # BLD AUTO: 1.5 K/UL (ref 0.3–1)
MONOCYTES NFR BLD: 5.5 % (ref 4–15)
MONOCYTES NFR BLD: 7.2 % (ref 4–15)
MONOCYTES NFR BLD: 7.3 % (ref 4–15)
NEUTROPHILS # BLD AUTO: 12.6 K/UL (ref 1.8–7.7)
NEUTROPHILS # BLD AUTO: 14.9 K/UL (ref 1.8–7.7)
NEUTROPHILS # BLD AUTO: 15.9 K/UL (ref 1.8–7.7)
NEUTROPHILS NFR BLD: 78.6 % (ref 38–73)
NEUTROPHILS NFR BLD: 78.9 % (ref 38–73)
NEUTROPHILS NFR BLD: 82.6 % (ref 38–73)
NRBC BLD-RTO: 0 /100 WBC
PHOSPHATE SERPL-MCNC: 4.6 MG/DL (ref 2.7–4.5)
PLATELET # BLD AUTO: 564 K/UL (ref 150–450)
PLATELET # BLD AUTO: 640 K/UL (ref 150–450)
PLATELET # BLD AUTO: 673 K/UL (ref 150–450)
PMV BLD AUTO: 8.9 FL (ref 9.2–12.9)
PMV BLD AUTO: 8.9 FL (ref 9.2–12.9)
PMV BLD AUTO: 9 FL (ref 9.2–12.9)
POTASSIUM SERPL-SCNC: 4.2 MMOL/L (ref 3.5–5.1)
PROT SERPL-MCNC: 5.8 G/DL (ref 6–8.4)
RBC # BLD AUTO: 2.68 M/UL (ref 4–5.4)
RBC # BLD AUTO: 2.92 M/UL (ref 4–5.4)
RBC # BLD AUTO: 2.96 M/UL (ref 4–5.4)
SODIUM SERPL-SCNC: 136 MMOL/L (ref 136–145)
WBC # BLD AUTO: 16.03 K/UL (ref 3.9–12.7)
WBC # BLD AUTO: 18 K/UL (ref 3.9–12.7)
WBC # BLD AUTO: 20.23 K/UL (ref 3.9–12.7)

## 2023-02-14 PROCEDURE — 25000003 PHARM REV CODE 250: Performed by: STUDENT IN AN ORGANIZED HEALTH CARE EDUCATION/TRAINING PROGRAM

## 2023-02-14 PROCEDURE — 85025 COMPLETE CBC W/AUTO DIFF WBC: CPT

## 2023-02-14 PROCEDURE — A4216 STERILE WATER/SALINE, 10 ML: HCPCS | Performed by: NURSE PRACTITIONER

## 2023-02-14 PROCEDURE — 99233 SBSQ HOSP IP/OBS HIGH 50: CPT | Mod: ,,, | Performed by: STUDENT IN AN ORGANIZED HEALTH CARE EDUCATION/TRAINING PROGRAM

## 2023-02-14 PROCEDURE — 83735 ASSAY OF MAGNESIUM: CPT

## 2023-02-14 PROCEDURE — 85025 COMPLETE CBC W/AUTO DIFF WBC: CPT | Mod: 91 | Performed by: STUDENT IN AN ORGANIZED HEALTH CARE EDUCATION/TRAINING PROGRAM

## 2023-02-14 PROCEDURE — 63600175 PHARM REV CODE 636 W HCPCS: Mod: TB,JG | Performed by: STUDENT IN AN ORGANIZED HEALTH CARE EDUCATION/TRAINING PROGRAM

## 2023-02-14 PROCEDURE — 20600001 HC STEP DOWN PRIVATE ROOM

## 2023-02-14 PROCEDURE — 97530 THERAPEUTIC ACTIVITIES: CPT

## 2023-02-14 PROCEDURE — 84100 ASSAY OF PHOSPHORUS: CPT

## 2023-02-14 PROCEDURE — 94761 N-INVAS EAR/PLS OXIMETRY MLT: CPT

## 2023-02-14 PROCEDURE — 25000003 PHARM REV CODE 250: Performed by: HOSPITALIST

## 2023-02-14 PROCEDURE — 93010 ELECTROCARDIOGRAM REPORT: CPT | Mod: ,,, | Performed by: INTERNAL MEDICINE

## 2023-02-14 PROCEDURE — 27000221 HC OXYGEN, UP TO 24 HOURS

## 2023-02-14 PROCEDURE — 80053 COMPREHEN METABOLIC PANEL: CPT

## 2023-02-14 PROCEDURE — 93005 ELECTROCARDIOGRAM TRACING: CPT

## 2023-02-14 PROCEDURE — 25000003 PHARM REV CODE 250

## 2023-02-14 PROCEDURE — 99233 PR SUBSEQUENT HOSPITAL CARE,LEVL III: ICD-10-PCS | Mod: ,,, | Performed by: STUDENT IN AN ORGANIZED HEALTH CARE EDUCATION/TRAINING PROGRAM

## 2023-02-14 PROCEDURE — 93010 EKG 12-LEAD: ICD-10-PCS | Mod: ,,, | Performed by: INTERNAL MEDICINE

## 2023-02-14 PROCEDURE — 25000003 PHARM REV CODE 250: Performed by: NURSE PRACTITIONER

## 2023-02-14 RX ADMIN — LEVOTHYROXINE SODIUM 50 MCG: 50 TABLET ORAL at 06:02

## 2023-02-14 RX ADMIN — GABAPENTIN 400 MG: 400 CAPSULE ORAL at 09:02

## 2023-02-14 RX ADMIN — DULOXETINE 60 MG: 60 CAPSULE, DELAYED RELEASE ORAL at 09:02

## 2023-02-14 RX ADMIN — ATORVASTATIN CALCIUM 20 MG: 20 TABLET, FILM COATED ORAL at 08:02

## 2023-02-14 RX ADMIN — LEVOFLOXACIN 750 MG: 750 TABLET, FILM COATED ORAL at 08:02

## 2023-02-14 RX ADMIN — ACETAMINOPHEN 650 MG: 325 TABLET ORAL at 10:02

## 2023-02-14 RX ADMIN — DULOXETINE 60 MG: 60 CAPSULE, DELAYED RELEASE ORAL at 08:02

## 2023-02-14 RX ADMIN — METOPROLOL TARTRATE 50 MG: 50 TABLET, FILM COATED ORAL at 08:02

## 2023-02-14 RX ADMIN — DIGOXIN 0.25 MG: 125 TABLET ORAL at 08:02

## 2023-02-14 RX ADMIN — METOPROLOL TARTRATE 50 MG: 50 TABLET, FILM COATED ORAL at 12:02

## 2023-02-14 RX ADMIN — Medication 9 MG: at 09:02

## 2023-02-14 RX ADMIN — Medication 10 ML: at 05:02

## 2023-02-14 RX ADMIN — GABAPENTIN 400 MG: 400 CAPSULE ORAL at 08:02

## 2023-02-14 RX ADMIN — LOSARTAN POTASSIUM 100 MG: 50 TABLET, FILM COATED ORAL at 08:02

## 2023-02-14 RX ADMIN — PROMETHAZINE HYDROCHLORIDE AND CODEINE PHOSPHATE 5 ML: 6.25; 1 SOLUTION ORAL at 08:02

## 2023-02-14 RX ADMIN — AMITRIPTYLINE HYDROCHLORIDE 75 MG: 50 TABLET, FILM COATED ORAL at 09:02

## 2023-02-14 RX ADMIN — PROMETHAZINE HYDROCHLORIDE AND CODEINE PHOSPHATE 5 ML: 6.25; 1 SOLUTION ORAL at 09:02

## 2023-02-14 RX ADMIN — BENZONATATE 100 MG: 100 CAPSULE ORAL at 10:02

## 2023-02-14 RX ADMIN — PROMETHAZINE HYDROCHLORIDE AND CODEINE PHOSPHATE 5 ML: 6.25; 1 SOLUTION ORAL at 05:02

## 2023-02-14 RX ADMIN — METOPROLOL TARTRATE 50 MG: 50 TABLET, FILM COATED ORAL at 09:02

## 2023-02-14 RX ADMIN — GABAPENTIN 400 MG: 400 CAPSULE ORAL at 05:02

## 2023-02-14 RX ADMIN — BUPROPION HYDROCHLORIDE 300 MG: 150 TABLET, FILM COATED, EXTENDED RELEASE ORAL at 08:02

## 2023-02-14 RX ADMIN — ONDANSETRON 4 MG: 4 TABLET, ORALLY DISINTEGRATING ORAL at 10:02

## 2023-02-14 RX ADMIN — VANCOMYCIN HYDROCHLORIDE 1250 MG: 5 INJECTION, POWDER, LYOPHILIZED, FOR SOLUTION INTRAVENOUS at 09:02

## 2023-02-14 RX ADMIN — Medication 10 ML: at 12:02

## 2023-02-14 NOTE — PT/OT/SLP PROGRESS
Physical Therapy      Patient Name:  Skyla Ernandez   MRN:  [id number]    Patient not seen today secondary to nsg. HOLD, pt coughing up blood at this time.Will follow-up in AM.

## 2023-02-14 NOTE — PT/OT/SLP PROGRESS
"Occupational Therapy   Treatment    Name: Skyla Ernandez  MRN: [id number]  Admitting Diagnosis:  Hemoptysis       Recommendations:     Discharge Recommendations: home health OT  Discharge Equipment Recommendations:  walker, rolling  Barriers to discharge:  None    Assessment:     Skyla Ernandez is a 52 y.o. female with a medical diagnosis of Hemoptysis.  Pt tolerated session well and without incident, ambulating ~140 ft with CGA with RW.  She presents with the following.  Performance deficits affecting function are weakness, impaired endurance, impaired self care skills, impaired functional mobility, gait instability, impaired balance, impaired cardiopulmonary response to activity.     Rehab Prognosis:  Good; patient would benefit from acute skilled OT services to address these deficits and reach maximum level of function.       Plan:     Patient to be seen 3 x/week to address the above listed problems via self-care/home management, therapeutic activities, therapeutic exercises  Plan of Care Expires: 03/14/23  Plan of Care Reviewed with: patient, spouse    Subjective   "I don't feel dizzy.  Just tired."  Pain/Comfort:  Pain Rating 1: 0/10  Pain Rating Post-Intervention 1: 0/10    Objective:     Communicated with: nursing prior to session.  Patient found sitting edge of bed with pulse ox (continuous), telemetry, PureWick, oxygen with her  present upon OT entry to room.    General Precautions: Standard, fall    Orthopedic Precautions:N/A  Braces: N/A  Respiratory Status: Nasal cannula, flow 4 L/min with humidication     Occupational Performance:     Bed Mobility:    N/A due to pt sitting EOB at beginning of session and up in chair at end of session    Functional Mobility/Transfers:  Patient completed Sit <> Stand Transfer from EOB with stand by assistance with rolling walker   Patient completed Bed <> Chair Transfer using Step Transfer technique with contact guard assistance with rolling walker  Functional Mobility: " Pt ambulated ~140 ft in her room and in the hallway with CGA with RW and chair follow with portable oxygen tank.  She reported no dizziness and had no overt LOB.  Pt was mildly unsteady and required cueing for RW management and staying inside the walker during turns.  Her vitals were WNL.    Activities of Daily Living:  Pt declined due to having already performed.  Therapist observed pt eating her lunch without assistance with HOB elevated.  She required assistance to disconnect/reconnect PureWick.    [facility] 6 Click ADL: 20    Treatment & Education:  Pt and her  edu on role of OT, POC, safety when performing self care tasks, benefit of performing OOB activity, and safety when performing functional transfers and mobility.    - Self care tasks completed-- as noted above      Patient left up in chair with all lines intact, call button in reach, and her  present    GOALS:   Multidisciplinary Problems       Occupational Therapy Goals          Problem: Occupational Therapy    Goal Priority Disciplines Outcome Interventions   Occupational Therapy Goal     OT, PT/OT     Description: Goals to be met by: 02/19/2023     Patient will increase functional independence with ADLs by performing:    LE Dressing with Contact Guard Assistance.  Grooming while standing at sink with Contact Guard Assistance.  Toileting from toilet with Stand-by Assistance for hygiene and clothing management.                          Time Tracking:     OT Date of Treatment: 02/14/23  OT Start Time: 1553  OT Stop Time: 1610  OT Total Time (min): 17 min    Billable Minutes:Therapeutic Activity 17 min    OT/AUGUSTO: OT          2/14/2023

## 2023-02-14 NOTE — NURSING
Home Oxygen Evaluation    Date Performed: 2023    1) Patient's Home O2 Sat on room air, while at rest: 89%        If O2 sats on room air at rest are 88% or below, patient qualifies. No additional testing needed. Document N/A in steps 2 and 3. If 89% or above, complete steps 2.      2) Patient's O2 Sat on room air while exercisin%        If O2 sats on room air while exercising remain 89% or above patient does not qualify, no further testing needed Document N/A in step 3. If O2 sats on room air while exercising are 88% or below, continue to step 3.      3) Patient's O2 Sat while exercising on O2: 94% at 2 LPM         (Must show improvement from #2 for patients to qualify)    If O2 sats improve on oxygen, patient qualifies for portable oxygen. If not, the patient does not qualify.

## 2023-02-14 NOTE — NURSING
Pulse sustaining at 55. Rec'd Metoprolol 50mg at 1300 per MD. Has hx atrial flutter. No concerns at present w/ bradycardic pulse

## 2023-02-14 NOTE — PLAN OF CARE
[facility] - Telemetry Stepdown  Discharge Reassessment    Primary Care Provider: Primary Doctor No    Expected Discharge Date: 2/15/2023    Reassessment (most recent)       Discharge Reassessment - 02/14/23 1333          Discharge Reassessment    Assessment Type Discharge Planning Reassessment     Did the patient's condition or plan change since previous assessment? Yes     Discharge Plan discussed with: Spouse/sig other;Patient     Communicated MICHELE with patient/caregiver Yes     Discharge Plan A Home Health     Discharge Plan B Home with family     DME Needed Upon Discharge  other (see comments)   TBD    Discharge Barriers Identified None     Why the patient remains in the hospital Requires continued medical care                     Dominique Barriga RN  Ext [phone]

## 2023-02-14 NOTE — SUBJECTIVE & OBJECTIVE
Interval History: Patient reports having had episode of hemoptysis last night and again this morning.  Hb 8.7-->7.5-->8.1  Hemodynamically stable, afebrile  Denies worsening shortness of breath    Review of Systems  Objective:     Vital Signs (Most Recent):  Temp: 98.1 °F (36.7 °C) (02/14/23 1138)  Pulse: (!) 55 (02/14/23 1503)  Resp: 20 (02/14/23 1138)  BP: (!) 91/55 (02/14/23 1138)  SpO2: (!) 90 % (02/14/23 1503)   Vital Signs (24h Range):  Temp:  [97.6 °F (36.4 °C)-98.4 °F (36.9 °C)] 98.1 °F (36.7 °C)  Pulse:  [55-95] 55  Resp:  [20] 20  SpO2:  [90 %-98 %] 90 %  BP: ()/(55-67) 91/55     Weight: 98 kg (216 lb 0.8 oz)  Body mass index is 35.95 kg/m².    Intake/Output Summary (Last 24 hours) at 2/14/2023 1537  Last data filed at 2/14/2023 0638  Gross per 24 hour   Intake 480 ml   Output 2075 ml   Net -1595 ml      Physical Exam  Constitutional:       General: She is not in acute distress.     Appearance: She is obese. She is not ill-appearing or diaphoretic.   HENT:      Nose:      Comments: Nasal cannula in place     Mouth/Throat:      Pharynx: Oropharynx is clear. No oropharyngeal exudate or posterior oropharyngeal erythema.      Comments: No obvious bleeding in mouth or throat  Cardiovascular:      Rate and Rhythm: Normal rate and regular rhythm.      Heart sounds: Normal heart sounds. No murmur heard.  Pulmonary:      Effort: Pulmonary effort is normal. No respiratory distress.      Breath sounds: Rhonchi present. No wheezing.   Abdominal:      General: Abdomen is flat. There is no distension.      Palpations: Abdomen is soft. There is no mass.      Tenderness: There is no abdominal tenderness. There is no guarding or rebound.   Musculoskeletal:         General: Normal range of motion.      Right lower leg: No edema.      Left lower leg: No edema.   Skin:     General: Skin is warm.      Coloration: Skin is not jaundiced.      Findings: No bruising.   Neurological:      Mental Status: She is alert and  oriented to person, place, and time.       MELD-Na score: 8 at 2/13/2023  5:33 AM  MELD score: 8 at 2/13/2023  5:33 AM  Calculated from:  Serum Creatinine: 0.6 mg/dL (Using min of 1 mg/dL) at 2/13/2023  5:33 AM  Serum Sodium: 138 mmol/L (Using max of 137 mmol/L) at 2/13/2023  5:33 AM  Total Bilirubin: 0.4 mg/dL (Using min of 1 mg/dL) at 2/13/2023  5:33 AM  INR(ratio): 1.2 at 2/11/2023  1:40 AM  Age: 52 years    Significant Labs:  CBC:  Recent Labs   Lab 02/13/23  0533 02/14/23  0415 02/14/23  1005   WBC 19.50* 16.03* 20.23*   HGB 8.7* 7.5* 8.1*   HCT 29.0* 25.2* 27.3*   * 564* 673*     CMP:  Recent Labs   Lab 02/13/23  0533 02/14/23  0415    136   K 4.2 4.2   CL 91* 91*   CO2 38* 34*   * 120*   BUN 12 15   CREATININE 0.6 0.7   CALCIUM 9.4 9.1   PROT 6.0 5.8*   ALBUMIN 1.6* 1.6*   BILITOT 0.4 0.4   ALKPHOS 156* 146*   AST 41* 31   ALT 73* 58*   ANIONGAP 9 11     PTINR:  No results for input(s): INR in the last 48 hours.    Significant Procedures:   Dobutamine Stress Test with Color Flow: No results found for this or any previous visit.

## 2023-02-14 NOTE — ASSESSMENT & PLAN NOTE
On 02/09, HR noted in 130s to 150s.  Patient remained asymptomatic, denied palpitations, chest pain, shortness of breath, lightheadedness or dizziness.  EKG obtained, consistent with a flutter with RVR.  Consulted EP; Started metoprolol 12.5 b.i.d, uptitrating. Holding anticoagulation given hemoptysis.  2/11 loaded with dig and started PO  Increasing metoprolol to 50 mg qid  Now rate controlled

## 2023-02-14 NOTE — NURSING
AAO x4 at time of shift change. Labs et POC reviewed w/ pt. Noted hgb trending downwards from 8.7 to 7.5. Pt cont to have cough but no hemoptysis at this time. MD harden

## 2023-02-14 NOTE — PROGRESS NOTES
"[facility] - Telemetry [facility] Medicine  Progress Note    Patient Name: Skyla Ernandez  MRN: [id number]  Patient Class: IP- Inpatient   Admission Date: 2/6/2023  Length of Stay: 8 days  Attending Physician: Tyrell Polanco*  Primary Care Provider: Primary Doctor No        Subjective:     Principal Problem:Hemoptysis        HPI:  Per ICU hand off : " 51 yo female with hypertension, hyperlipidemia, and thyroid disease who presented to [facility] Emergency Department this morning for hemoptysis. She reports 3 weeks of cough in which she first noticed blood tinged sputum 2 weeks ago. She reports that the cough has been productive of whitish brown sputum which became darker and eventually appeared dark red. The cough often interferes with sleep. She has also experienced shortness of breath over the past couple of days. She endorses sore throat, RUSSELL, and 2-3 pillow orthopnea. This morning she coughed up over a cup of bright red blood and decided to present to the ED. She denies any chronic tobacco history apart from occasional smoking cigarettes in her late teens. She denies piror homelessness, incarceration, close contacts with TB, or recent travel. She denies having a history of asthma, COPD, cirrhosis or nose bleeds but does report history of scoliosis.     Workup at the [facility] ED includes leukocytosis to 35K, hemoglobin of 6.8 now s/p 1 unit of pRBC's, thrombocytosis to 882, NT-proBNP 1640, troponin negative, and lactic acid 1.8. CXR diffuse patchy opacities scattered throughout the right middle and right lower lung zone which may be seen with pneumonia, aspiration, or edema. CTA of the chest showed no evidence of pulmonary embolism. Solid mass in the superior aspect of the right lung concerning for malignancy. Mass is partially necrotic with air bubbles peripherally. Mass abuts the pericardium and mediastinum. There is associated atelectasis/consolidation of the right middle " "lobe. Patchy opacities in the right lower lobe as well as the left lung which is nonspecific but may represent atelectasis, infectious process, or hemorrhage. She was started on levofloxacin. She was noted to be tachycardic to 100's with soft pressures 87//73 and stable oxygenation on 2LNC.     Patient transferred to [facility] medical intensive care unit for higher level of care."      Overview/Hospital Course:  Per ICU hand off: Patient admitted to MICU for further evaluation and management of hemoptysis. Hb of 6.7, received a second unit of pRBC's as patient continued to have cough with blood tinged sputum. On levofloxacin and vancomycin given leukocytosis, fever, and concerned for pneumonia. Patient remains HDS and satting well on 4L NC. Hypoxic respiratory failure likely 2/2 pneumonia vs hemoptysis vs lung mass. S/p bronchoscopy on 2/7 with BAL performed which was significant for grossly normal airways apart from sticky thick white mucus. Results from BAL unremarkable so far and cultures NGTD. Patient continues to be HDS and satting well on LFNC. Hb stable without any further hemoptysis for over 24 hours. Patient is medically ready for step down to hospital medicine.  2/9: Overnight events of hemoptysis noted.  Patient reports  soreness in lateral chest wall muscles from cough.  Heart rate noted in 130s to 150s.  EKG consistent with a flutter.  Starting metoprolol 12.5 b.i.d..  Holding Eliquis in the setting of hemoptysis. Consulted EP    2/10: Received 1 u blood yesterday with improvement in Hb. Denies further hemoptysis. SOB improved. Weaning oxygen. Afebrile  HR persistently elevated aflutter, increasing metoprolol.  Denies chest pain, denies palpitations.     2/13: Rate controlled this am with HR 70-80; pt reports improvement in shortness of breath  Improving cough and states does occasionally have small amount of pinkish tinge to sputum, no overt bleeding      Interval History: Patient reports having had " episode of hemoptysis last night and again this morning.  Hb 8.7-->7.5-->8.1  Hemodynamically stable, afebrile  Denies worsening shortness of breath    Review of Systems  Objective:     Vital Signs (Most Recent):  Temp: 98.1 °F (36.7 °C) (02/14/23 1138)  Pulse: (!) 55 (02/14/23 1503)  Resp: 20 (02/14/23 1138)  BP: (!) 91/55 (02/14/23 1138)  SpO2: (!) 90 % (02/14/23 1503)   Vital Signs (24h Range):  Temp:  [97.6 °F (36.4 °C)-98.4 °F (36.9 °C)] 98.1 °F (36.7 °C)  Pulse:  [55-95] 55  Resp:  [20] 20  SpO2:  [90 %-98 %] 90 %  BP: ()/(55-67) 91/55     Weight: 98 kg (216 lb 0.8 oz)  Body mass index is 35.95 kg/m².    Intake/Output Summary (Last 24 hours) at 2/14/2023 1537  Last data filed at 2/14/2023 0638  Gross per 24 hour   Intake 480 ml   Output 2075 ml   Net -1595 ml      Physical Exam  Constitutional:       General: She is not in acute distress.     Appearance: She is obese. She is not ill-appearing or diaphoretic.   HENT:      Nose:      Comments: Nasal cannula in place     Mouth/Throat:      Pharynx: Oropharynx is clear. No oropharyngeal exudate or posterior oropharyngeal erythema.      Comments: No obvious bleeding in mouth or throat  Cardiovascular:      Rate and Rhythm: Normal rate and regular rhythm.      Heart sounds: Normal heart sounds. No murmur heard.  Pulmonary:      Effort: Pulmonary effort is normal. No respiratory distress.      Breath sounds: Rhonchi present. No wheezing.   Abdominal:      General: Abdomen is flat. There is no distension.      Palpations: Abdomen is soft. There is no mass.      Tenderness: There is no abdominal tenderness. There is no guarding or rebound.   Musculoskeletal:         General: Normal range of motion.      Right lower leg: No edema.      Left lower leg: No edema.   Skin:     General: Skin is warm.      Coloration: Skin is not jaundiced.      Findings: No bruising.   Neurological:      Mental Status: She is alert and oriented to person, place, and time.       MELD-Na  score: 8 at 2/13/2023  5:33 AM  MELD score: 8 at 2/13/2023  5:33 AM  Calculated from:  Serum Creatinine: 0.6 mg/dL (Using min of 1 mg/dL) at 2/13/2023  5:33 AM  Serum Sodium: 138 mmol/L (Using max of 137 mmol/L) at 2/13/2023  5:33 AM  Total Bilirubin: 0.4 mg/dL (Using min of 1 mg/dL) at 2/13/2023  5:33 AM  INR(ratio): 1.2 at 2/11/2023  1:40 AM  Age: 52 years    Significant Labs:  CBC:  Recent Labs   Lab 02/13/23  0533 02/14/23  0415 02/14/23  1005   WBC 19.50* 16.03* 20.23*   HGB 8.7* 7.5* 8.1*   HCT 29.0* 25.2* 27.3*   * 564* 673*     CMP:  Recent Labs   Lab 02/13/23  0533 02/14/23  0415    136   K 4.2 4.2   CL 91* 91*   CO2 38* 34*   * 120*   BUN 12 15   CREATININE 0.6 0.7   CALCIUM 9.4 9.1   PROT 6.0 5.8*   ALBUMIN 1.6* 1.6*   BILITOT 0.4 0.4   ALKPHOS 156* 146*   AST 41* 31   ALT 73* 58*   ANIONGAP 9 11     PTINR:  No results for input(s): INR in the last 48 hours.    Significant Procedures:   Dobutamine Stress Test with Color Flow: No results found for this or any previous visit.      Assessment/Plan:      * Hemoptysis  Patient with cough for 3 weeks and coughed up a cup of blood prior to presentation  Not on blood thinners, non-tobacco smoker, heavy marijuana smoker, no lung disease history   CTA at outside hospital negative for PE but there was a solid mass in the superior aspect of the right lung concerning for malignancy.  Mass is partially necrotic with air bubbles peripherally.    At outside hospital Hg 6.8, was given 1 unit pRBC   Was also given nebulized TXA at outside hospital   2/8: Hemoglobin remaining stable with resolution of hemoptysis    - Discontinue nebulized TXA  - Monitor oxygen   - s/p bronch on 2/7, see procedure note  - Consult IR for possible emoblization if uncontrolled bleeding   - prn codeine-promethazine for cough  - Trend CBC   - monitor coags      Mass of upper lobe of right lung  CTA at outside hospital negative for PE but there was a solid mass in the superior  aspect of the right lung concerning for malignancy.  Mass is partially necrotic with air bubbles peripherally.    WBC 35 and lacate normal at outside hospital      - On Vanc/levofloxacin   - FU cultures   - s/p bronch on 2/7, no mass visualized in airways to biopsy but BAL performed with results pending  - per pulm recommend full treatment with antibiotics and a follow up in pulmonary with repeat CT Scan in a few weeks time to determine which aspects of the abnormalities visualized on CT are pneumonia vs. Potential mass    Acute systolic heart failure  Results for orders placed during the hospital encounter of 02/06/23  On losartan, BB  Trial Lasix    Echo    Interpretation Summary  · Atrial fibrillation ( or Flutter?) observed.  · The left ventricle is mildly enlarged with moderately decreased systolic function.  · The estimated ejection fraction is 35%.  · There is abnormal septal wall motion.  · Mild left atrial enlargement.  · Mild right ventricular enlargement with moderately to severely reduced right ventricular systolic function.  · Elevated central venous pressure (15 mmHg).  · Mild to moderate tricuspid regurgitation.  · Mild pulmonic regurgitation.  · The estimated PA systolic pressure is 56 mmHg.  · There is moderate pulmonary hypertension.  · Small posterior pericardial effusion. Trivial under the RA.        Atrial flutter  On 02/09, HR noted in 130s to 150s.  Patient remained asymptomatic, denied palpitations, chest pain, shortness of breath, lightheadedness or dizziness.  EKG obtained, consistent with a flutter with RVR.  Consulted EP; Started metoprolol 12.5 b.i.d, uptitrating. Holding anticoagulation given hemoptysis.  2/11 loaded with dig and started PO  Increasing metoprolol to 50 mg qid  Now rate controlled      Anxiety and depression  - continue home bupropion, amitriptyline, clonazepam, and duloxetine.        Neuropathy  - home gabapentin, amitriptyline, duloxetine       Acute hypoxemic  respiratory failure  Likely due to hemoptysis, lung mass, and possible pneumonia      - Vanc/levofloxacin   - CBC daily   - Supplemental O2 as needed     Thyroid disease  Continue home synthroid       Anemia  Hg 6.8 at outside hospital, was given 1 unit pRBC   Here with hemoptysis   Repeat Hb noted at 6.7, received a second unit of pRBC's  2/8: hemoptysis resolved and hemoglobin stable     - CBC daily   - FU coags   - Transfuse if Hg <7, platelets <10, or platelets <50 and actively bleeding       VTE Risk Mitigation (From admission, onward)         Ordered     Reason for No Pharmacological VTE Prophylaxis  Once        Question:  Reasons:  Answer:  Active Bleeding    02/06/23 1846     IP VTE HIGH RISK PATIENT  Once         02/06/23 1846     Place sequential compression device  Until discontinued         02/06/23 1846                Discharge Planning   MICHELE: 2/15/2023     Code Status: Full Code   Is the patient medically ready for discharge?:     Reason for patient still in hospital (select all that apply): Patient trending condition and Treatment  Discharge Plan A: Home Health        Tyrell Young MD  Department of Hospital Medicine   [facility] - Telemetry Stepdown

## 2023-02-14 NOTE — PLAN OF CARE
Pt alert and oriented, able to make needs known. Resp even and unlabored. Cough continues. O2 @2l in place. No c/o SOB. Purwick in plae. Drsg changed to Rt upper arm PICC. No c/o pain. Bed in low position, side rails up and call light within reach. Assessments completed this shift.

## 2023-02-15 LAB
ALBUMIN SERPL BCP-MCNC: 1.6 G/DL (ref 3.5–5.2)
ALP SERPL-CCNC: 128 U/L (ref 55–135)
ALT SERPL W/O P-5'-P-CCNC: 50 U/L (ref 10–44)
ANION GAP SERPL CALC-SCNC: 6 MMOL/L (ref 8–16)
AST SERPL-CCNC: 26 U/L (ref 10–40)
BASOPHILS # BLD AUTO: 0.06 K/UL (ref 0–0.2)
BASOPHILS # BLD AUTO: 0.08 K/UL (ref 0–0.2)
BASOPHILS # BLD AUTO: 0.1 K/UL (ref 0–0.2)
BASOPHILS NFR BLD: 0.3 % (ref 0–1.9)
BASOPHILS NFR BLD: 0.4 % (ref 0–1.9)
BASOPHILS NFR BLD: 0.5 % (ref 0–1.9)
BILIRUB SERPL-MCNC: 0.2 MG/DL (ref 0.1–1)
BUN SERPL-MCNC: 18 MG/DL (ref 6–20)
CALCIUM SERPL-MCNC: 9 MG/DL (ref 8.7–10.5)
CHLORIDE SERPL-SCNC: 91 MMOL/L (ref 95–110)
CO2 SERPL-SCNC: 38 MMOL/L (ref 23–29)
CREAT SERPL-MCNC: 0.9 MG/DL (ref 0.5–1.4)
DIFFERENTIAL METHOD: ABNORMAL
EOSINOPHIL # BLD AUTO: 0.4 K/UL (ref 0–0.5)
EOSINOPHIL # BLD AUTO: 0.4 K/UL (ref 0–0.5)
EOSINOPHIL # BLD AUTO: 0.6 K/UL (ref 0–0.5)
EOSINOPHIL NFR BLD: 2.1 % (ref 0–8)
EOSINOPHIL NFR BLD: 2.5 % (ref 0–8)
EOSINOPHIL NFR BLD: 3.1 % (ref 0–8)
ERYTHROCYTE [DISTWIDTH] IN BLOOD BY AUTOMATED COUNT: 17.3 % (ref 11.5–14.5)
ERYTHROCYTE [DISTWIDTH] IN BLOOD BY AUTOMATED COUNT: 17.4 % (ref 11.5–14.5)
ERYTHROCYTE [DISTWIDTH] IN BLOOD BY AUTOMATED COUNT: 17.4 % (ref 11.5–14.5)
EST. GFR  (NO RACE VARIABLE): >60 ML/MIN/1.73 M^2
GLUCOSE SERPL-MCNC: 125 MG/DL (ref 70–110)
HCT VFR BLD AUTO: 26 % (ref 37–48.5)
HCT VFR BLD AUTO: 27.2 % (ref 37–48.5)
HCT VFR BLD AUTO: 29.2 % (ref 37–48.5)
HGB BLD-MCNC: 7.8 G/DL (ref 12–16)
HGB BLD-MCNC: 8.2 G/DL (ref 12–16)
HGB BLD-MCNC: 8.8 G/DL (ref 12–16)
IMM GRANULOCYTES # BLD AUTO: 0.16 K/UL (ref 0–0.04)
IMM GRANULOCYTES # BLD AUTO: 0.17 K/UL (ref 0–0.04)
IMM GRANULOCYTES # BLD AUTO: 0.23 K/UL (ref 0–0.04)
IMM GRANULOCYTES NFR BLD AUTO: 0.9 % (ref 0–0.5)
IMM GRANULOCYTES NFR BLD AUTO: 1 % (ref 0–0.5)
IMM GRANULOCYTES NFR BLD AUTO: 1.2 % (ref 0–0.5)
LACTATE SERPL-SCNC: 1.3 MMOL/L (ref 0.5–2.2)
LYMPHOCYTES # BLD AUTO: 1.2 K/UL (ref 1–4.8)
LYMPHOCYTES # BLD AUTO: 1.3 K/UL (ref 1–4.8)
LYMPHOCYTES # BLD AUTO: 1.4 K/UL (ref 1–4.8)
LYMPHOCYTES NFR BLD: 6.6 % (ref 18–48)
LYMPHOCYTES NFR BLD: 6.7 % (ref 18–48)
LYMPHOCYTES NFR BLD: 7.7 % (ref 18–48)
MAGNESIUM SERPL-MCNC: 1.6 MG/DL (ref 1.6–2.6)
MCH RBC QN AUTO: 28.2 PG (ref 27–31)
MCH RBC QN AUTO: 28.3 PG (ref 27–31)
MCH RBC QN AUTO: 28.5 PG (ref 27–31)
MCHC RBC AUTO-ENTMCNC: 30 G/DL (ref 32–36)
MCHC RBC AUTO-ENTMCNC: 30.1 G/DL (ref 32–36)
MCHC RBC AUTO-ENTMCNC: 30.1 G/DL (ref 32–36)
MCV RBC AUTO: 94 FL (ref 82–98)
MCV RBC AUTO: 94 FL (ref 82–98)
MCV RBC AUTO: 95 FL (ref 82–98)
MONOCYTES # BLD AUTO: 1.2 K/UL (ref 0.3–1)
MONOCYTES # BLD AUTO: 1.3 K/UL (ref 0.3–1)
MONOCYTES # BLD AUTO: 1.8 K/UL (ref 0.3–1)
MONOCYTES NFR BLD: 6.9 % (ref 4–15)
MONOCYTES NFR BLD: 7.2 % (ref 4–15)
MONOCYTES NFR BLD: 9.1 % (ref 4–15)
NEUTROPHILS # BLD AUTO: 14.3 K/UL (ref 1.8–7.7)
NEUTROPHILS # BLD AUTO: 14.7 K/UL (ref 1.8–7.7)
NEUTROPHILS # BLD AUTO: 15.3 K/UL (ref 1.8–7.7)
NEUTROPHILS NFR BLD: 79.4 % (ref 38–73)
NEUTROPHILS NFR BLD: 81.6 % (ref 38–73)
NEUTROPHILS NFR BLD: 82.8 % (ref 38–73)
NRBC BLD-RTO: 0 /100 WBC
PHOSPHATE SERPL-MCNC: 4.4 MG/DL (ref 2.7–4.5)
PLATELET # BLD AUTO: 609 K/UL (ref 150–450)
PLATELET # BLD AUTO: 638 K/UL (ref 150–450)
PLATELET # BLD AUTO: 654 K/UL (ref 150–450)
PMV BLD AUTO: 8.5 FL (ref 9.2–12.9)
PMV BLD AUTO: 8.7 FL (ref 9.2–12.9)
PMV BLD AUTO: 8.7 FL (ref 9.2–12.9)
POTASSIUM SERPL-SCNC: 4.6 MMOL/L (ref 3.5–5.1)
PROT SERPL-MCNC: 5.7 G/DL (ref 6–8.4)
RBC # BLD AUTO: 2.77 M/UL (ref 4–5.4)
RBC # BLD AUTO: 2.9 M/UL (ref 4–5.4)
RBC # BLD AUTO: 3.09 M/UL (ref 4–5.4)
SODIUM SERPL-SCNC: 135 MMOL/L (ref 136–145)
VANCOMYCIN TROUGH SERPL-MCNC: 17.4 UG/ML (ref 10–22)
WBC # BLD AUTO: 17.51 K/UL (ref 3.9–12.7)
WBC # BLD AUTO: 17.78 K/UL (ref 3.9–12.7)
WBC # BLD AUTO: 19.24 K/UL (ref 3.9–12.7)

## 2023-02-15 PROCEDURE — 97110 THERAPEUTIC EXERCISES: CPT

## 2023-02-15 PROCEDURE — 63600175 PHARM REV CODE 636 W HCPCS: Mod: TB,JG | Performed by: STUDENT IN AN ORGANIZED HEALTH CARE EDUCATION/TRAINING PROGRAM

## 2023-02-15 PROCEDURE — 80202 ASSAY OF VANCOMYCIN: CPT | Performed by: STUDENT IN AN ORGANIZED HEALTH CARE EDUCATION/TRAINING PROGRAM

## 2023-02-15 PROCEDURE — 93005 ELECTROCARDIOGRAM TRACING: CPT

## 2023-02-15 PROCEDURE — 20600001 HC STEP DOWN PRIVATE ROOM

## 2023-02-15 PROCEDURE — 85025 COMPLETE CBC W/AUTO DIFF WBC: CPT | Mod: 91 | Performed by: STUDENT IN AN ORGANIZED HEALTH CARE EDUCATION/TRAINING PROGRAM

## 2023-02-15 PROCEDURE — A4216 STERILE WATER/SALINE, 10 ML: HCPCS | Performed by: NURSE PRACTITIONER

## 2023-02-15 PROCEDURE — 93010 EKG 12-LEAD: ICD-10-PCS | Mod: ,,, | Performed by: INTERNAL MEDICINE

## 2023-02-15 PROCEDURE — 83605 ASSAY OF LACTIC ACID: CPT | Performed by: STUDENT IN AN ORGANIZED HEALTH CARE EDUCATION/TRAINING PROGRAM

## 2023-02-15 PROCEDURE — 25000003 PHARM REV CODE 250

## 2023-02-15 PROCEDURE — 97116 GAIT TRAINING THERAPY: CPT

## 2023-02-15 PROCEDURE — 25000003 PHARM REV CODE 250: Performed by: STUDENT IN AN ORGANIZED HEALTH CARE EDUCATION/TRAINING PROGRAM

## 2023-02-15 PROCEDURE — 25000003 PHARM REV CODE 250: Performed by: NURSE PRACTITIONER

## 2023-02-15 PROCEDURE — 99233 SBSQ HOSP IP/OBS HIGH 50: CPT | Mod: ,,, | Performed by: STUDENT IN AN ORGANIZED HEALTH CARE EDUCATION/TRAINING PROGRAM

## 2023-02-15 PROCEDURE — 83735 ASSAY OF MAGNESIUM: CPT

## 2023-02-15 PROCEDURE — 84100 ASSAY OF PHOSPHORUS: CPT

## 2023-02-15 PROCEDURE — 36415 COLL VENOUS BLD VENIPUNCTURE: CPT | Performed by: STUDENT IN AN ORGANIZED HEALTH CARE EDUCATION/TRAINING PROGRAM

## 2023-02-15 PROCEDURE — 80053 COMPREHEN METABOLIC PANEL: CPT

## 2023-02-15 PROCEDURE — 99233 PR SUBSEQUENT HOSPITAL CARE,LEVL III: ICD-10-PCS | Mod: ,,, | Performed by: STUDENT IN AN ORGANIZED HEALTH CARE EDUCATION/TRAINING PROGRAM

## 2023-02-15 PROCEDURE — 93010 ELECTROCARDIOGRAM REPORT: CPT | Mod: ,,, | Performed by: INTERNAL MEDICINE

## 2023-02-15 RX ADMIN — LOSARTAN POTASSIUM 100 MG: 50 TABLET, FILM COATED ORAL at 09:02

## 2023-02-15 RX ADMIN — VANCOMYCIN HYDROCHLORIDE 1250 MG: 5 INJECTION, POWDER, LYOPHILIZED, FOR SOLUTION INTRAVENOUS at 12:02

## 2023-02-15 RX ADMIN — LEVOFLOXACIN 750 MG: 750 TABLET, FILM COATED ORAL at 09:02

## 2023-02-15 RX ADMIN — Medication 10 ML: at 12:02

## 2023-02-15 RX ADMIN — DULOXETINE 60 MG: 60 CAPSULE, DELAYED RELEASE ORAL at 08:02

## 2023-02-15 RX ADMIN — ACETAMINOPHEN 650 MG: 325 TABLET ORAL at 09:02

## 2023-02-15 RX ADMIN — AMITRIPTYLINE HYDROCHLORIDE 75 MG: 50 TABLET, FILM COATED ORAL at 08:02

## 2023-02-15 RX ADMIN — GABAPENTIN 400 MG: 400 CAPSULE ORAL at 09:02

## 2023-02-15 RX ADMIN — PROMETHAZINE HYDROCHLORIDE AND CODEINE PHOSPHATE 5 ML: 6.25; 1 SOLUTION ORAL at 07:02

## 2023-02-15 RX ADMIN — Medication 9 MG: at 08:02

## 2023-02-15 RX ADMIN — GABAPENTIN 400 MG: 400 CAPSULE ORAL at 08:02

## 2023-02-15 RX ADMIN — DULOXETINE 60 MG: 60 CAPSULE, DELAYED RELEASE ORAL at 09:02

## 2023-02-15 RX ADMIN — SODIUM CHLORIDE 500 ML: 9 INJECTION, SOLUTION INTRAVENOUS at 03:02

## 2023-02-15 RX ADMIN — DIGOXIN 0.25 MG: 125 TABLET ORAL at 09:02

## 2023-02-15 RX ADMIN — Medication 10 ML: at 06:02

## 2023-02-15 RX ADMIN — PROMETHAZINE HYDROCHLORIDE AND CODEINE PHOSPHATE 5 ML: 6.25; 1 SOLUTION ORAL at 03:02

## 2023-02-15 RX ADMIN — GABAPENTIN 400 MG: 400 CAPSULE ORAL at 03:02

## 2023-02-15 RX ADMIN — METOPROLOL TARTRATE 50 MG: 50 TABLET, FILM COATED ORAL at 09:02

## 2023-02-15 RX ADMIN — VANCOMYCIN HYDROCHLORIDE 1250 MG: 5 INJECTION, POWDER, LYOPHILIZED, FOR SOLUTION INTRAVENOUS at 08:02

## 2023-02-15 RX ADMIN — ATORVASTATIN CALCIUM 20 MG: 20 TABLET, FILM COATED ORAL at 09:02

## 2023-02-15 RX ADMIN — PROMETHAZINE HYDROCHLORIDE AND CODEINE PHOSPHATE 5 ML: 6.25; 1 SOLUTION ORAL at 09:02

## 2023-02-15 RX ADMIN — BUPROPION HYDROCHLORIDE 300 MG: 150 TABLET, FILM COATED, EXTENDED RELEASE ORAL at 09:02

## 2023-02-15 RX ADMIN — ACETAMINOPHEN 650 MG: 325 TABLET ORAL at 06:02

## 2023-02-15 RX ADMIN — LEVOTHYROXINE SODIUM 50 MCG: 50 TABLET ORAL at 06:02

## 2023-02-15 NOTE — SUBJECTIVE & OBJECTIVE
Interval History: Has not had further episodes of hemoptysis  Hb stable  Pt denies shortness of breath    Review of Systems  Objective:     Vital Signs (Most Recent):  Temp: 98.2 °F (36.8 °C) (02/15/23 1130)  Pulse: 65 (02/15/23 1416)  Resp: (!) 21 (02/15/23 1130)  BP: (!) 83/45 (02/15/23 1416)  SpO2: 95 % (02/15/23 1130)   Vital Signs (24h Range):  Temp:  [98.2 °F (36.8 °C)-98.5 °F (36.9 °C)] 98.2 °F (36.8 °C)  Pulse:  [63-99] 65  Resp:  [18-21] 21  SpO2:  [94 %-98 %] 95 %  BP: ()/(45-53) 83/45     Weight: 98 kg (216 lb 0.8 oz)  Body mass index is 35.95 kg/m².    Intake/Output Summary (Last 24 hours) at 2/15/2023 1615  Last data filed at 2/15/2023 0553  Gross per 24 hour   Intake 420 ml   Output 1800 ml   Net -1380 ml      Physical Exam  Constitutional:       General: She is not in acute distress.     Appearance: She is obese. She is not ill-appearing or diaphoretic.   HENT:      Nose:      Comments: Nasal cannula in place     Mouth/Throat:      Pharynx: Oropharynx is clear. No oropharyngeal exudate or posterior oropharyngeal erythema.      Comments: No obvious bleeding in mouth or throat  Cardiovascular:      Rate and Rhythm: Normal rate and regular rhythm.      Heart sounds: Normal heart sounds. No murmur heard.  Pulmonary:      Effort: Pulmonary effort is normal. No respiratory distress.      Breath sounds: Rhonchi present. No wheezing.   Abdominal:      General: Abdomen is flat. There is no distension.      Palpations: Abdomen is soft. There is no mass.      Tenderness: There is no abdominal tenderness. There is no guarding or rebound.   Musculoskeletal:         General: Normal range of motion.      Right lower leg: No edema.      Left lower leg: No edema.   Skin:     General: Skin is warm.      Coloration: Skin is not jaundiced.      Findings: No bruising.   Neurological:      Mental Status: She is alert and oriented to person, place, and time.       MELD-Na score: 8 at 2/13/2023  5:33 AM  MELD score: 8  at 2/13/2023  5:33 AM  Calculated from:  Serum Creatinine: 0.6 mg/dL (Using min of 1 mg/dL) at 2/13/2023  5:33 AM  Serum Sodium: 138 mmol/L (Using max of 137 mmol/L) at 2/13/2023  5:33 AM  Total Bilirubin: 0.4 mg/dL (Using min of 1 mg/dL) at 2/13/2023  5:33 AM  INR(ratio): 1.2 at 2/11/2023  1:40 AM  Age: 52 years    Significant Labs:  CBC:  Recent Labs   Lab 02/14/23  1600 02/15/23  0038 02/15/23  0949   WBC 18.00* 17.78* 17.51*   HGB 8.4* 7.8* 8.2*   HCT 27.4* 26.0* 27.2*   * 609* 638*     CMP:  Recent Labs   Lab 02/14/23  0415 02/15/23  0619    135*   K 4.2 4.6   CL 91* 91*   CO2 34* 38*   * 125*   BUN 15 18   CREATININE 0.7 0.9   CALCIUM 9.1 9.0   PROT 5.8* 5.7*   ALBUMIN 1.6* 1.6*   BILITOT 0.4 0.2   ALKPHOS 146* 128   AST 31 26   ALT 58* 50*   ANIONGAP 11 6*     PTINR:  No results for input(s): INR in the last 48 hours.    Significant Procedures:   Dobutamine Stress Test with Color Flow: No results found for this or any previous visit.

## 2023-02-15 NOTE — PT/OT/SLP PROGRESS
"Physical Therapy Treatment    Patient Name:  Skyla Ernandez   MRN:  [id number]    Recommendations:     Discharge Recommendations: home health PT  Discharge Equipment Recommendations: walker, rolling  Barriers to discharge: None    Assessment:     Skyla Ernandez is a 52 y.o. female admitted with a medical diagnosis of Hemoptysis.  She presents with the following impairments/functional limitations: weakness, impaired endurance, impaired functional mobility, gait instability, decreased lower extremity function, impaired cardiopulmonary response to activity.    Pt demos decrease in functional gait capacity this date 2/2 fatigue, ambulating 10', 60' before requiring seated rest break. Pt requires inc time for pt education on benefits of using RW for increased independence and dec fall risk, pt verbalizes understanding. Performs HEP without adverse effect and good demo of understanding/retention at conclusion of session. Dry cough throughout session, SaO2 86-94% with activity on 3lpm NC, HR 70bpm. NSG aware. Patient presents below PLOF and would benefit from acute care skilled PT to address deficits detailed above and promote safe and functional independence.      Rehab Prognosis: Good; patient would benefit from acute skilled PT services to address these deficits and reach maximum level of function.    Recent Surgery: * No surgery found *      Plan:     During this hospitalization, patient to be seen 3 x/week to address the identified rehab impairments via gait training, therapeutic activities, therapeutic exercises, neuromuscular re-education and progress toward the following goals:    Plan of Care Expires:  03/15/23    Subjective     Chief Complaint: "I did a lot yesterday"  Patient/Family Comments/goals: go home  Pain/Comfort:  Pain Rating 1: 0/10  Pain Rating Post-Intervention 1: 0/10      Objective:     Communicated with RN prior to session.  Patient found supine with pulse ox (continuous), oxygen, telemetry upon PT entry " to room.     General Precautions: Standard, fall  Orthopedic Precautions: N/A  Braces: N/A  Respiratory Status: Nasal cannula, flow 3 L/min     Functional Mobility:  Bed Mobility:     Scooting: stand by assistance  Supine to Sit: stand by assistance  Transfers:     100% VC for safe RW positioning and hand placement  Sit to Stand:  stand by assistance with rolling walker  Bed to Chair: stand by assistance with  rolling walker  using  Stand Pivot  Toilet Transfer: SBA with  no AD  using  Stand Pivot  Gait: pt amb 10' without AD, 60' with RW requiring SBA for safety and O2 mgmt, improved dynamic stability with use of RW (reaching for external support without use of AD to correct postural instability). Pt gait capacity limited by fatigue requiring seated rest break and cued PLB      AM-PAC 6 CLICK MOBILITY  Turning over in bed (including adjusting bedclothes, sheets and blankets)?: 4  Sitting down on and standing up from a chair with arms (e.g., wheelchair, bedside commode, etc.): 3  Moving from lying on back to sitting on the side of the bed?: 3  Moving to and from a bed to a chair (including a wheelchair)?: 3  Need to walk in hospital room?: 3  Climbing 3-5 steps with a railing?: 3  Basic Mobility Total Score: 19       Treatment & Education:  Performed functional mobility training detailed above.     Patient educated on role of physical therapy, PT POC, importance of OOB activity, HEP, safe functional mobility techniques, safe use of RW, use of call light, presence of staff for all out of bed mobility at this time. Allotted time for all questions to be answered. Patient verbalizes understanding of education provided.     Home Exercise Program   To increase BLE force production, ROM and activity tolerance outside of skilled therapy sessions for inc independence during transfers and gait:  Exercise Position Sets x Reps Resistance Skilled Cues   Hip flexion Seated  2x10 BLE Body weight Good technique demonstrated, good  carryover reassessed at end of session   LAQ ' ' '    Ankle DF/PF ' ' '                 Patient left up in chair with all lines intact, call button in reach, and RN notified..    GOALS:   Multidisciplinary Problems       Physical Therapy Goals          Problem: Physical Therapy    Goal Priority Disciplines Outcome Goal Variances Interventions   Physical Therapy Goal     PT, PT/OT Ongoing, Progressing     Description: Goals to be met by: 23     Patient will increase functional independence with mobility by performin. Supine to sit with Modified [place]  2. Sit to supine with Modified [place]  3. Sit to stand transfer with Modified [place]  4. Bed to chair transfer with Modified [place] using Rolling Walker or without assistive device as appropriate  5. Gait  x 200 feet with Supervision using Rolling Walker or without Assistive device, as needed   6. Ascend/descend 4 stair with bilateral Handrails Supervision using No Assistive Device.   7. Lower extremity exercise program x15 reps per handout, with assistance as needed                         Time Tracking:     PT Received On: 02/15/23  PT Start Time: 0840     PT Stop Time: 0910  PT Total Time (min): 30 min     Billable Minutes: Gait Training 15 and Therapeutic Exercise 15    Treatment Type: Treatment        PTA Visit Number: 0     02/15/2023

## 2023-02-15 NOTE — PROGRESS NOTES
Pharmacokinetic Assessment Follow Up: IV Vancomycin    Vancomycin serum concentration assessment(s):    The trough level was drawn correctly and can be used to guide therapy at this time. The measurement is within the desired definitive target range of 15 to 20 mcg/mL.    Vancomycin Regimen Plan:    Continue regimen to Vancomycin 1250 mg IV every 12 hours with next serum trough concentration measured at 0800 prior to morning dose on 2/17    Drug levels (last 3 results):  Recent Labs   Lab Result Units 02/13/23  0533 02/15/23  0949   Vancomycin-Trough ug/mL 21.9 17.4       Pharmacy will continue to follow and monitor vancomycin.    Please contact pharmacy at extension [phone] for questions regarding this assessment.    Thank you for the consult,   Christina Lennon       Patient brief summary:  Skyla Ernandez is a 52 y.o. female initiated on antimicrobial therapy with IV Vancomycin for treatment of lower respiratory infection    Drug Allergies:   Review of patient's allergies indicates:   Allergen Reactions    Penicillins Hives       Renal Function:   Estimated Creatinine Clearance: 82.8 mL/min (based on SCr of 0.9 mg/dL).,     Dialysis Method (if applicable):  N/A    CBC (last 72 hours):  Recent Labs   Lab Result Units 02/13/23  0533 02/14/23  0415 02/14/23  1005 02/14/23  1600 02/15/23  0038 02/15/23  0949   WBC K/uL 19.50* 16.03* 20.23* 18.00* 17.78* 17.51*   Hemoglobin g/dL 8.7* 7.5* 8.1* 8.4* 7.8* 8.2*   Hematocrit % 29.0* 25.2* 27.3* 27.4* 26.0* 27.2*   Platelets K/uL 601* 564* 673* 640* 609* 638*   Gran % % 81.2* 78.6* 78.9* 82.6* 82.8* 81.6*   Lymph % % 7.4* 9.1* 9.6* 8.1* 6.6* 7.7*   Mono % % 6.7 7.3 7.2 5.5 7.2 6.9   Eosinophil % % 2.8 2.9 2.6 2.3 2.1 2.5   Basophil % % 0.4 0.4 0.4 0.3 0.4 0.3   Differential Method  Automated Automated Automated Automated Automated Automated       Metabolic Panel (last 72 hours):  Recent Labs   Lab Result Units 02/13/23  0533 02/14/23  0415 02/15/23  0619   Sodium mmol/L 138 136  135*   Potassium mmol/L 4.2 4.2 4.6   Chloride mmol/L 91* 91* 91*   CO2 mmol/L 38* 34* 38*   Glucose mg/dL 121* 120* 125*   BUN mg/dL 12 15 18   Creatinine mg/dL 0.6 0.7 0.9   Albumin g/dL 1.6* 1.6* 1.6*   Total Bilirubin mg/dL 0.4 0.4 0.2   Alkaline Phosphatase U/L 156* 146* 128   AST U/L 41* 31 26   ALT U/L 73* 58* 50*   Magnesium mg/dL 1.6 1.6 1.6   Phosphorus mg/dL 4.0 4.6* 4.4       Vancomycin Administrations:  vancomycin given in the last 96 hours                     vancomycin 1,250 mg in dextrose 5 % (D5W) 250 mL IVPB (Vial-Mate) (mg) 1,250 mg New Bag 02/15/23 1208     1,250 mg New Bag 02/14/23 2158     1,250 mg New Bag  0900     1,250 mg New Bag 02/13/23 2127    vancomycin 1,500 mg in dextrose 5 % (D5W) 250 mL IVPB (Vial-Mate) (mg) 1,500 mg New Bag 02/13/23 0653     1,500 mg New Bag 02/12/23 1824     1,500 mg New Bag  0643     1,500 mg New Bag 02/11/23 1830                    Microbiologic Results:  Microbiology Results (last 7 days)       Procedure Component Value Units Date/Time    Fungus culture [[id number]]  (Abnormal) Collected: 02/07/23 1800    Order Status: Completed Specimen: Body Fluid from Lung, RUL Updated: 02/14/23 1408     Fungus (Mycology) Culture CANDIDA ALBICANS  Few      Narrative:      Bronchial Brush    Blood culture [[id number]] Collected: 02/06/23 2050    Order Status: Completed Specimen: Blood from Peripheral, Forearm, Left Updated: 02/11/23 2322     Blood Culture, Routine No growth after 5 days.    Blood culture [[id number]] Collected: 02/06/23 2050    Order Status: Completed Specimen: Blood from Peripheral, Forearm, Left Updated: 02/11/23 2322     Blood Culture, Routine No growth after 5 days.    Culture, Respiratory with Gram Stain [[id number]] Collected: 02/07/23 1759    Order Status: Completed Specimen: Respiratory from Bronchial [place] Updated: 02/11/23 0947     Respiratory Culture Normal respiratory silvia      No S aureus or Pseudomonas isolated.     Gram Stain (Respiratory) <10  epithelial cells per low power field.     Gram Stain (Respiratory) Rare WBC's     Gram Stain (Respiratory) Rare Gram positive cocci    AFB Culture & Smear [[id number]] Collected: 02/07/23 1800    Order Status: Completed Specimen: Body Fluid from Lung, RUL Updated: 02/08/23 2127     AFB Culture & Smear Culture in progress     AFB CULTURE STAIN No acid fast bacilli seen.    Narrative:      Bronchial Brush

## 2023-02-15 NOTE — PLAN OF CARE
CM notified that patient will need home O2 set-up before discharge. Call placed to [facility] ([phone]) to discuss home O2 set-up. This CM informed that [facility] currently has no portable concentrators available at this time and will not be able to process patient's order.    Call placed to [facility] ([phone]).  Facesheet and home O2 orders faxed to [phone] for processing. Will continue to follow.    15:10PM  Call placed to [facility] to check on the status of patient's order. [facility] informed this CM that they are waiting on insurance approval at this time. CM met with Ms. Awais and updated her on the discharge plan. Patient expressed understanding.    Dominique Barriga RN  Ext [phone]

## 2023-02-15 NOTE — PLAN OF CARE
Problem: Adult Inpatient Plan of Care  Goal: Plan of Care Review  Outcome: Ongoing, Progressing  Goal: Patient-Specific Goal (Individualized)  Outcome: Ongoing, Progressing  Goal: Absence of Hospital-Acquired Illness or Injury  Outcome: Ongoing, Progressing  Goal: Optimal Comfort and Wellbeing  Outcome: Ongoing, Progressing  Goal: Readiness for Transition of Care  Outcome: Ongoing, Progressing     Problem: Skin Injury Risk Increased  Goal: Skin Health and Integrity  Outcome: Ongoing, Progressing     Problem: Gas Exchange Impaired  Goal: Optimal Gas Exchange  Outcome: Ongoing, Progressing     Problem: Infection  Goal: Absence of Infection Signs and Symptoms  Outcome: Ongoing, Progressing

## 2023-02-15 NOTE — PROGRESS NOTES
"[facility] - Telemetry [facility] Medicine  Progress Note    Patient Name: Skyla Ernandez  MRN: [id number]  Patient Class: IP- Inpatient   Admission Date: 2/6/2023  Length of Stay: 9 days  Attending Physician: Tyrell Polanco*  Primary Care Provider: Primary Doctor No        Subjective:     Principal Problem:Hemoptysis        HPI:  Per ICU hand off : " 51 yo female with hypertension, hyperlipidemia, and thyroid disease who presented to [facility] Emergency Department this morning for hemoptysis. She reports 3 weeks of cough in which she first noticed blood tinged sputum 2 weeks ago. She reports that the cough has been productive of whitish brown sputum which became darker and eventually appeared dark red. The cough often interferes with sleep. She has also experienced shortness of breath over the past couple of days. She endorses sore throat, RUSSELL, and 2-3 pillow orthopnea. This morning she coughed up over a cup of bright red blood and decided to present to the ED. She denies any chronic tobacco history apart from occasional smoking cigarettes in her late teens. She denies piror homelessness, incarceration, close contacts with TB, or recent travel. She denies having a history of asthma, COPD, cirrhosis or nose bleeds but does report history of scoliosis.     Workup at the [facility] ED includes leukocytosis to 35K, hemoglobin of 6.8 now s/p 1 unit of pRBC's, thrombocytosis to 882, NT-proBNP 1640, troponin negative, and lactic acid 1.8. CXR diffuse patchy opacities scattered throughout the right middle and right lower lung zone which may be seen with pneumonia, aspiration, or edema. CTA of the chest showed no evidence of pulmonary embolism. Solid mass in the superior aspect of the right lung concerning for malignancy. Mass is partially necrotic with air bubbles peripherally. Mass abuts the pericardium and mediastinum. There is associated atelectasis/consolidation of the right middle " "lobe. Patchy opacities in the right lower lobe as well as the left lung which is nonspecific but may represent atelectasis, infectious process, or hemorrhage. She was started on levofloxacin. She was noted to be tachycardic to 100's with soft pressures 87//73 and stable oxygenation on 2LNC.     [facility] transferred [facility] medical [facility] for higher level of care."      Overview/Hospital Course:  Per ICU hand off: Patient admitted to MICU for further evaluation and management of hemoptysis. Hb of 6.7, received a second unit of pRBC's as patient continued to have cough with blood tinged sputum. On levofloxacin and vancomycin given leukocytosis, fever, and concerned for pneumonia. Patient remains HDS and satting well on 4L NC. Hypoxic respiratory failure likely 2/2 pneumonia vs hemoptysis vs lung mass. S/p bronchoscopy on 2/7 with BAL performed which was significant for grossly normal airways apart from sticky thick white mucus. Results from BAL unremarkable so far and cultures NGTD. Patient continues to be HDS and satting well on LFNC. Hb stable without any further hemoptysis for over 24 hours. Patient is medically ready for step down to hospital medicine.  2/9: Overnight events of hemoptysis noted.  Patient reports  soreness in lateral chest wall muscles from cough.  Heart rate noted in 130s to 150s.  EKG consistent with a flutter.  Starting metoprolol 12.5 b.i.d..  Holding Eliquis in the setting of hemoptysis. Consulted EP    2/10: Received 1 u blood yesterday with improvement in Hb. Denies further hemoptysis. SOB improved. Weaning oxygen. Afebrile  HR persistently elevated aflutter, increasing metoprolol.  Denies chest pain, denies palpitations.     2/13: Rate controlled this am with HR 70-80; pt reports improvement in shortness of breath  Improving cough and states does occasionally have small amount of pinkish tinge to sputum, no overt bleeding    2/14: Patient reports having had episode of " hemoptysis last night and again this morning.  Hb 8.7-->7.5-->8.1  Hemodynamically stable, afebrile  Denies worsening shortness of breath      Interval History: Has not had further episodes of hemoptysis  Hb stable  Pt denies shortness of breath    Review of Systems  Objective:     Vital Signs (Most Recent):  Temp: 98.2 °F (36.8 °C) (02/15/23 1130)  Pulse: 65 (02/15/23 1416)  Resp: (!) 21 (02/15/23 1130)  BP: (!) 83/45 (02/15/23 1416)  SpO2: 95 % (02/15/23 1130)   Vital Signs (24h Range):  Temp:  [98.2 °F (36.8 °C)-98.5 °F (36.9 °C)] 98.2 °F (36.8 °C)  Pulse:  [63-99] 65  Resp:  [18-21] 21  SpO2:  [94 %-98 %] 95 %  BP: ()/(45-53) 83/45     Weight: 98 kg (216 lb 0.8 oz)  Body mass index is 35.95 kg/m².    Intake/Output Summary (Last 24 hours) at 2/15/2023 1615  Last data filed at 2/15/2023 0553  Gross per 24 hour   Intake 420 ml   Output 1800 ml   Net -1380 ml      Physical Exam  Constitutional:       General: She is not in acute distress.     Appearance: She is obese. She is not ill-appearing or diaphoretic.   HENT:      Nose:      Comments: Nasal cannula in place     Mouth/Throat:      Pharynx: Oropharynx is clear. No oropharyngeal exudate or posterior oropharyngeal erythema.      Comments: No obvious bleeding in mouth or throat  Cardiovascular:      Rate and Rhythm: Normal rate and regular rhythm.      Heart sounds: Normal heart sounds. No murmur heard.  Pulmonary:      Effort: Pulmonary effort is normal. No respiratory distress.      Breath sounds: Rhonchi present. No wheezing.   Abdominal:      General: Abdomen is flat. There is no distension.      Palpations: Abdomen is soft. There is no mass.      Tenderness: There is no abdominal tenderness. There is no guarding or rebound.   Musculoskeletal:         General: Normal range of motion.      Right lower leg: No edema.      Left lower leg: No edema.   Skin:     General: Skin is warm.      Coloration: Skin is not jaundiced.      Findings: No bruising.    Neurological:      Mental Status: She is alert and oriented to person, place, and time.       MELD-Na score: 8 at 2/13/2023  5:33 AM  MELD score: 8 at 2/13/2023  5:33 AM  Calculated from:  Serum Creatinine: 0.6 mg/dL (Using min of 1 mg/dL) at 2/13/2023  5:33 AM  Serum Sodium: 138 mmol/L (Using max of 137 mmol/L) at 2/13/2023  5:33 AM  Total Bilirubin: 0.4 mg/dL (Using min of 1 mg/dL) at 2/13/2023  5:33 AM  INR(ratio): 1.2 at 2/11/2023  1:40 AM  Age: 52 years    Significant Labs:  CBC:  Recent Labs   Lab 02/14/23  1600 02/15/23  0038 02/15/23  0949   WBC 18.00* 17.78* 17.51*   HGB 8.4* 7.8* 8.2*   HCT 27.4* 26.0* 27.2*   * 609* 638*     CMP:  Recent Labs   Lab 02/14/23  0415 02/15/23  0619    135*   K 4.2 4.6   CL 91* 91*   CO2 34* 38*   * 125*   BUN 15 18   CREATININE 0.7 0.9   CALCIUM 9.1 9.0   PROT 5.8* 5.7*   ALBUMIN 1.6* 1.6*   BILITOT 0.4 0.2   ALKPHOS 146* 128   AST 31 26   ALT 58* 50*   ANIONGAP 11 6*     PTINR:  No results for input(s): INR in the last 48 hours.    Significant Procedures:   Dobutamine Stress Test with Color Flow: No results found for this or any previous visit.      Assessment/Plan:      * Hemoptysis  Patient with cough for 3 weeks and coughed up a cup of blood prior to presentation  Not on blood thinners, non-tobacco smoker, heavy marijuana smoker, no lung disease history   CTA at outside hospital negative for PE but there was a solid mass in the superior aspect of the right lung concerning for malignancy.  Mass is partially necrotic with air bubbles peripherally.    At outside hospital Hg 6.8, was given 1 unit pRBC   Was also given nebulized TXA at outside hospital   2/8: Hemoglobin remaining stable with resolution of hemoptysis    - Discontinue nebulized TXA  - Monitor oxygen   - s/p bronch on 2/7, see procedure note  - Consult IR for possible emoblization if uncontrolled bleeding   - prn codeine-promethazine for cough  - Trend CBC   - monitor coags      Mass of  upper lobe of right lung  CTA at outside hospital negative for PE but there was a solid mass in the superior aspect of the right lung concerning for malignancy.  Mass is partially necrotic with air bubbles peripherally.    WBC 35 and lacate normal at outside hospital      - On Vanc/levofloxacin   - FU cultures   - s/p bronch on 2/7, no mass visualized in airways to biopsy but BAL performed with results pending  - per pulm recommend full treatment with antibiotics and a follow up in pulmonary with repeat CT Scan in a few weeks time to determine which aspects of the abnormalities visualized on CT are pneumonia vs. Potential mass    Acute systolic heart failure  Results for orders placed during the hospital encounter of 02/06/23  On losartan, BB  Trial Lasix    Echo    Interpretation Summary  · Atrial fibrillation ( or Flutter?) observed.  · The left ventricle is mildly enlarged with moderately decreased systolic function.  · The estimated ejection fraction is 35%.  · There is abnormal septal wall motion.  · Mild left atrial enlargement.  · Mild right ventricular enlargement with moderately to severely reduced right ventricular systolic function.  · Elevated central venous pressure (15 mmHg).  · Mild to moderate tricuspid regurgitation.  · Mild pulmonic regurgitation.  · The estimated PA systolic pressure is 56 mmHg.  · There is moderate pulmonary hypertension.  · Small posterior pericardial effusion. Trivial under the RA.        Atrial flutter  On 02/09, HR noted in 130s to 150s.  Patient remained asymptomatic, denied palpitations, chest pain, shortness of breath, lightheadedness or dizziness.  EKG obtained, consistent with a flutter with RVR.  Consulted EP; Started metoprolol 12.5 b.i.d, uptitrating. Holding anticoagulation given hemoptysis.  2/11 loaded with dig and started PO  Increasing metoprolol to 50 mg qid  Now rate controlled      Anxiety and depression  - continue home bupropion, amitriptyline, clonazepam,  and duloxetine.        Neuropathy  - home gabapentin, amitriptyline, duloxetine       Acute hypoxemic respiratory failure  Likely due to hemoptysis, lung mass, and possible pneumonia      - Vanc/levofloxacin   - CBC daily   - Supplemental O2 as needed     Thyroid disease  Continue home synthroid       Anemia  Hg 6.8 at outside hospital, was given 1 unit pRBC   Here with hemoptysis   Repeat Hb noted at 6.7, received a second unit of pRBC's  2/8: hemoptysis resolved and hemoglobin stable     - CBC daily   - FU coags   - Transfuse if Hg <7, platelets <10, or platelets <50 and actively bleeding         VTE Risk Mitigation (From admission, onward)         Ordered     Reason for No Pharmacological VTE Prophylaxis  Once        Question:  Reasons:  Answer:  Active Bleeding    02/06/23 1846     IP VTE HIGH RISK PATIENT  Once         02/06/23 1846     Place sequential compression device  Until discontinued         02/06/23 1846                Discharge Planning   MICHELE: 2/16/2023     Code Status: Full Code   Is the patient medically ready for discharge?:     Reason for patient still in hospital (select all that apply): Patient trending condition and Treatment  Discharge Plan A: Home Health          Tyrell Young MD  Department of Hospital Medicine   [facility] - Telemetry Stepdown

## 2023-02-16 LAB
ALBUMIN SERPL BCP-MCNC: 1.8 G/DL (ref 3.5–5.2)
ALP SERPL-CCNC: 133 U/L (ref 55–135)
ALT SERPL W/O P-5'-P-CCNC: 44 U/L (ref 10–44)
ANION GAP SERPL CALC-SCNC: 13 MMOL/L (ref 8–16)
AST SERPL-CCNC: 31 U/L (ref 10–40)
BASOPHILS # BLD AUTO: 0.09 K/UL (ref 0–0.2)
BASOPHILS # BLD AUTO: 0.09 K/UL (ref 0–0.2)
BASOPHILS NFR BLD: 0.5 % (ref 0–1.9)
BASOPHILS NFR BLD: 0.6 % (ref 0–1.9)
BILIRUB SERPL-MCNC: 0.4 MG/DL (ref 0.1–1)
BUN SERPL-MCNC: 16 MG/DL (ref 6–20)
CALCIUM SERPL-MCNC: 9.4 MG/DL (ref 8.7–10.5)
CHLORIDE SERPL-SCNC: 92 MMOL/L (ref 95–110)
CO2 SERPL-SCNC: 32 MMOL/L (ref 23–29)
CREAT SERPL-MCNC: 1.1 MG/DL (ref 0.5–1.4)
DIFFERENTIAL METHOD: ABNORMAL
DIFFERENTIAL METHOD: ABNORMAL
EOSINOPHIL # BLD AUTO: 0.5 K/UL (ref 0–0.5)
EOSINOPHIL # BLD AUTO: 0.6 K/UL (ref 0–0.5)
EOSINOPHIL NFR BLD: 3.1 % (ref 0–8)
EOSINOPHIL NFR BLD: 3.8 % (ref 0–8)
ERYTHROCYTE [DISTWIDTH] IN BLOOD BY AUTOMATED COUNT: 17.3 % (ref 11.5–14.5)
ERYTHROCYTE [DISTWIDTH] IN BLOOD BY AUTOMATED COUNT: 17.3 % (ref 11.5–14.5)
EST. GFR  (NO RACE VARIABLE): 59.7 ML/MIN/1.73 M^2
GLUCOSE SERPL-MCNC: 89 MG/DL (ref 70–110)
HCT VFR BLD AUTO: 25.6 % (ref 37–48.5)
HCT VFR BLD AUTO: 31.5 % (ref 37–48.5)
HGB BLD-MCNC: 8.2 G/DL (ref 12–16)
HGB BLD-MCNC: 9.6 G/DL (ref 12–16)
IMM GRANULOCYTES # BLD AUTO: 0.19 K/UL (ref 0–0.04)
IMM GRANULOCYTES # BLD AUTO: 0.27 K/UL (ref 0–0.04)
IMM GRANULOCYTES NFR BLD AUTO: 1.1 % (ref 0–0.5)
IMM GRANULOCYTES NFR BLD AUTO: 1.8 % (ref 0–0.5)
LYMPHOCYTES # BLD AUTO: 0.7 K/UL (ref 1–4.8)
LYMPHOCYTES # BLD AUTO: 1.5 K/UL (ref 1–4.8)
LYMPHOCYTES NFR BLD: 4.2 % (ref 18–48)
LYMPHOCYTES NFR BLD: 9.8 % (ref 18–48)
MAGNESIUM SERPL-MCNC: 1.7 MG/DL (ref 1.6–2.6)
MCH RBC QN AUTO: 28.4 PG (ref 27–31)
MCH RBC QN AUTO: 28.4 PG (ref 27–31)
MCHC RBC AUTO-ENTMCNC: 30.5 G/DL (ref 32–36)
MCHC RBC AUTO-ENTMCNC: 32 G/DL (ref 32–36)
MCV RBC AUTO: 89 FL (ref 82–98)
MCV RBC AUTO: 93 FL (ref 82–98)
MONOCYTES # BLD AUTO: 1.4 K/UL (ref 0.3–1)
MONOCYTES # BLD AUTO: 1.6 K/UL (ref 0.3–1)
MONOCYTES NFR BLD: 10.3 % (ref 4–15)
MONOCYTES NFR BLD: 8.1 % (ref 4–15)
NEUTROPHILS # BLD AUTO: 11.1 K/UL (ref 1.8–7.7)
NEUTROPHILS # BLD AUTO: 14 K/UL (ref 1.8–7.7)
NEUTROPHILS NFR BLD: 73.7 % (ref 38–73)
NEUTROPHILS NFR BLD: 83 % (ref 38–73)
NRBC BLD-RTO: 0 /100 WBC
NRBC BLD-RTO: 0 /100 WBC
PHOSPHATE SERPL-MCNC: 4.5 MG/DL (ref 2.7–4.5)
PLATELET # BLD AUTO: 494 K/UL (ref 150–450)
PLATELET # BLD AUTO: 622 K/UL (ref 150–450)
PMV BLD AUTO: 8.7 FL (ref 9.2–12.9)
PMV BLD AUTO: 9.6 FL (ref 9.2–12.9)
POTASSIUM SERPL-SCNC: 4.6 MMOL/L (ref 3.5–5.1)
POTASSIUM SERPL-SCNC: 6.3 MMOL/L (ref 3.5–5.1)
PROT SERPL-MCNC: 6.4 G/DL (ref 6–8.4)
RBC # BLD AUTO: 2.89 M/UL (ref 4–5.4)
RBC # BLD AUTO: 3.38 M/UL (ref 4–5.4)
SODIUM SERPL-SCNC: 137 MMOL/L (ref 136–145)
WBC # BLD AUTO: 15.05 K/UL (ref 3.9–12.7)
WBC # BLD AUTO: 16.82 K/UL (ref 3.9–12.7)

## 2023-02-16 PROCEDURE — 27100171 HC OXYGEN HIGH FLOW UP TO 24 HOURS

## 2023-02-16 PROCEDURE — 84100 ASSAY OF PHOSPHORUS: CPT

## 2023-02-16 PROCEDURE — 83735 ASSAY OF MAGNESIUM: CPT

## 2023-02-16 PROCEDURE — 25000003 PHARM REV CODE 250: Performed by: HOSPITALIST

## 2023-02-16 PROCEDURE — 25000003 PHARM REV CODE 250: Performed by: NURSE PRACTITIONER

## 2023-02-16 PROCEDURE — A4216 STERILE WATER/SALINE, 10 ML: HCPCS | Performed by: NURSE PRACTITIONER

## 2023-02-16 PROCEDURE — 93005 ELECTROCARDIOGRAM TRACING: CPT

## 2023-02-16 PROCEDURE — 36415 COLL VENOUS BLD VENIPUNCTURE: CPT

## 2023-02-16 PROCEDURE — 36415 COLL VENOUS BLD VENIPUNCTURE: CPT | Performed by: STUDENT IN AN ORGANIZED HEALTH CARE EDUCATION/TRAINING PROGRAM

## 2023-02-16 PROCEDURE — 94640 AIRWAY INHALATION TREATMENT: CPT

## 2023-02-16 PROCEDURE — 99900035 HC TECH TIME PER 15 MIN (STAT)

## 2023-02-16 PROCEDURE — 25000003 PHARM REV CODE 250: Performed by: STUDENT IN AN ORGANIZED HEALTH CARE EDUCATION/TRAINING PROGRAM

## 2023-02-16 PROCEDURE — 93010 ELECTROCARDIOGRAM REPORT: CPT | Mod: ,,, | Performed by: INTERNAL MEDICINE

## 2023-02-16 PROCEDURE — 20600001 HC STEP DOWN PRIVATE ROOM

## 2023-02-16 PROCEDURE — 94761 N-INVAS EAR/PLS OXIMETRY MLT: CPT

## 2023-02-16 PROCEDURE — 85025 COMPLETE CBC W/AUTO DIFF WBC: CPT | Performed by: STUDENT IN AN ORGANIZED HEALTH CARE EDUCATION/TRAINING PROGRAM

## 2023-02-16 PROCEDURE — 80053 COMPREHEN METABOLIC PANEL: CPT

## 2023-02-16 PROCEDURE — 84132 ASSAY OF SERUM POTASSIUM: CPT | Performed by: STUDENT IN AN ORGANIZED HEALTH CARE EDUCATION/TRAINING PROGRAM

## 2023-02-16 PROCEDURE — 25000242 PHARM REV CODE 250 ALT 637 W/ HCPCS: Performed by: STUDENT IN AN ORGANIZED HEALTH CARE EDUCATION/TRAINING PROGRAM

## 2023-02-16 PROCEDURE — 25000003 PHARM REV CODE 250: Performed by: INTERNAL MEDICINE

## 2023-02-16 PROCEDURE — 93010 EKG 12-LEAD: ICD-10-PCS | Mod: ,,, | Performed by: INTERNAL MEDICINE

## 2023-02-16 PROCEDURE — 63600175 PHARM REV CODE 636 W HCPCS: Mod: TB,JG | Performed by: STUDENT IN AN ORGANIZED HEALTH CARE EDUCATION/TRAINING PROGRAM

## 2023-02-16 PROCEDURE — 94664 DEMO&/EVAL PT USE INHALER: CPT

## 2023-02-16 PROCEDURE — 25000003 PHARM REV CODE 250

## 2023-02-16 RX ORDER — LOSARTAN POTASSIUM 25 MG/1
25 TABLET ORAL DAILY
Status: DISCONTINUED | OUTPATIENT
Start: 2023-02-17 | End: 2023-02-17 | Stop reason: HOSPADM

## 2023-02-16 RX ORDER — METOPROLOL SUCCINATE 50 MG/1
50 TABLET, EXTENDED RELEASE ORAL ONCE
Status: DISCONTINUED | OUTPATIENT
Start: 2023-02-16 | End: 2023-02-16

## 2023-02-16 RX ORDER — POLYETHYLENE GLYCOL 3350 17 G/17G
17 POWDER, FOR SOLUTION ORAL 2 TIMES DAILY PRN
Status: DISCONTINUED | OUTPATIENT
Start: 2023-02-16 | End: 2023-02-17 | Stop reason: HOSPADM

## 2023-02-16 RX ORDER — METOPROLOL SUCCINATE 100 MG/1
100 TABLET, EXTENDED RELEASE ORAL DAILY
Status: DISCONTINUED | OUTPATIENT
Start: 2023-02-17 | End: 2023-02-16

## 2023-02-16 RX ORDER — METOPROLOL SUCCINATE 50 MG/1
50 TABLET, EXTENDED RELEASE ORAL DAILY
Status: DISCONTINUED | OUTPATIENT
Start: 2023-02-17 | End: 2023-02-17

## 2023-02-16 RX ADMIN — Medication 10 ML: at 12:02

## 2023-02-16 RX ADMIN — POLYETHYLENE GLYCOL 3350 17 G: 17 POWDER, FOR SOLUTION ORAL at 04:02

## 2023-02-16 RX ADMIN — DIGOXIN 0.25 MG: 125 TABLET ORAL at 09:02

## 2023-02-16 RX ADMIN — BUPROPION HYDROCHLORIDE 300 MG: 150 TABLET, FILM COATED, EXTENDED RELEASE ORAL at 09:02

## 2023-02-16 RX ADMIN — BENZONATATE 100 MG: 100 CAPSULE ORAL at 09:02

## 2023-02-16 RX ADMIN — VANCOMYCIN HYDROCHLORIDE 1250 MG: 5 INJECTION, POWDER, LYOPHILIZED, FOR SOLUTION INTRAVENOUS at 09:02

## 2023-02-16 RX ADMIN — IPRATROPIUM BROMIDE AND ALBUTEROL SULFATE 3 ML: .5; 3 SOLUTION RESPIRATORY (INHALATION) at 11:02

## 2023-02-16 RX ADMIN — ACETAMINOPHEN 650 MG: 325 TABLET ORAL at 09:02

## 2023-02-16 RX ADMIN — Medication 9 MG: at 09:02

## 2023-02-16 RX ADMIN — GABAPENTIN 400 MG: 400 CAPSULE ORAL at 04:02

## 2023-02-16 RX ADMIN — ATORVASTATIN CALCIUM 20 MG: 20 TABLET, FILM COATED ORAL at 09:02

## 2023-02-16 RX ADMIN — DULOXETINE 60 MG: 60 CAPSULE, DELAYED RELEASE ORAL at 08:02

## 2023-02-16 RX ADMIN — IPRATROPIUM BROMIDE AND ALBUTEROL SULFATE 3 ML: .5; 3 SOLUTION RESPIRATORY (INHALATION) at 08:02

## 2023-02-16 RX ADMIN — AMITRIPTYLINE HYDROCHLORIDE 75 MG: 50 TABLET, FILM COATED ORAL at 08:02

## 2023-02-16 RX ADMIN — LEVOTHYROXINE SODIUM 50 MCG: 50 TABLET ORAL at 05:02

## 2023-02-16 RX ADMIN — LEVOFLOXACIN 750 MG: 750 TABLET, FILM COATED ORAL at 09:02

## 2023-02-16 RX ADMIN — LOSARTAN POTASSIUM 100 MG: 50 TABLET, FILM COATED ORAL at 09:02

## 2023-02-16 RX ADMIN — DULOXETINE 60 MG: 60 CAPSULE, DELAYED RELEASE ORAL at 09:02

## 2023-02-16 RX ADMIN — PROMETHAZINE HYDROCHLORIDE AND CODEINE PHOSPHATE 5 ML: 6.25; 1 SOLUTION ORAL at 05:02

## 2023-02-16 RX ADMIN — Medication 10 ML: at 06:02

## 2023-02-16 RX ADMIN — GABAPENTIN 400 MG: 400 CAPSULE ORAL at 09:02

## 2023-02-16 RX ADMIN — Medication 10 ML: at 05:02

## 2023-02-16 RX ADMIN — ACETAMINOPHEN 650 MG: 325 TABLET ORAL at 05:02

## 2023-02-16 RX ADMIN — METOPROLOL TARTRATE 50 MG: 50 TABLET, FILM COATED ORAL at 09:02

## 2023-02-16 NOTE — PLAN OF CARE
Call placed to [facility] ([phone]) to check on the status of patient's O2 delivery. [facility] informed this CM that they have not received insurance authorization but Ms. Ernandez's request has been escalated. Will continue to follow.    13:00 PM  Call received from [facility] informing this CM that they have called patient's spouse to arrange delivery of the concentrator and the portable O2 will be delivered to the room shortly. Patient notified of above conversation and expressed understanding. MD and patient's nurse notified of the above.    Dominique Barriga, RYLEE  Ext [phone]

## 2023-02-16 NOTE — PLAN OF CARE
Problem: Adult Inpatient Plan of Care  Goal: Plan of Care Review  Outcome: Ongoing, Progressing  Goal: Patient-Specific Goal (Individualized)  Outcome: Ongoing, Progressing  Goal: Absence of Hospital-Acquired Illness or Injury  Outcome: Ongoing, Progressing  Goal: Optimal Comfort and Wellbeing  Outcome: Ongoing, Progressing     Problem: Gas Exchange Impaired  Goal: Optimal Gas Exchange  Outcome: Ongoing, Progressing     Problem: Infection  Goal: Absence of Infection Signs and Symptoms  Outcome: Ongoing, Progressing     Problem: Skin Injury Risk Increased  Goal: Skin Health and Integrity  Outcome: Ongoing, Progressing     POC reviewed. Address questions and concerns. AAOX4. Hypotensive. Remain HF Oxygen @3l Sat>92%. DME delivered to home. Prn for cough and pain. Up in chair tolerated well. Free from falls or injuries. Purewick in place. Call light in reach. [email or web address].

## 2023-02-17 VITALS
OXYGEN SATURATION: 95 % | WEIGHT: 216.06 LBS | BODY MASS INDEX: 36 KG/M2 | RESPIRATION RATE: 20 BRPM | TEMPERATURE: 98 F | DIASTOLIC BLOOD PRESSURE: 57 MMHG | HEART RATE: 104 BPM | SYSTOLIC BLOOD PRESSURE: 130 MMHG | HEIGHT: 65 IN

## 2023-02-17 LAB
ALBUMIN SERPL BCP-MCNC: 1.8 G/DL (ref 3.5–5.2)
ANION GAP SERPL CALC-SCNC: 8 MMOL/L (ref 8–16)
BASOPHILS # BLD AUTO: 0.06 K/UL (ref 0–0.2)
BASOPHILS NFR BLD: 0.5 % (ref 0–1.9)
BUN SERPL-MCNC: 17 MG/DL (ref 6–20)
CALCIUM SERPL-MCNC: 9.6 MG/DL (ref 8.7–10.5)
CHLORIDE SERPL-SCNC: 92 MMOL/L (ref 95–110)
CO2 SERPL-SCNC: 37 MMOL/L (ref 23–29)
CREAT SERPL-MCNC: 1.1 MG/DL (ref 0.5–1.4)
DIFFERENTIAL METHOD: ABNORMAL
EOSINOPHIL # BLD AUTO: 0.4 K/UL (ref 0–0.5)
EOSINOPHIL NFR BLD: 3.4 % (ref 0–8)
ERYTHROCYTE [DISTWIDTH] IN BLOOD BY AUTOMATED COUNT: 17.2 % (ref 11.5–14.5)
EST. GFR  (NO RACE VARIABLE): 59.7 ML/MIN/1.73 M^2
GLUCOSE SERPL-MCNC: 125 MG/DL (ref 70–110)
HCT VFR BLD AUTO: 28.7 % (ref 37–48.5)
HGB BLD-MCNC: 8.5 G/DL (ref 12–16)
IMM GRANULOCYTES # BLD AUTO: 0.09 K/UL (ref 0–0.04)
IMM GRANULOCYTES NFR BLD AUTO: 0.7 % (ref 0–0.5)
LYMPHOCYTES # BLD AUTO: 0.8 K/UL (ref 1–4.8)
LYMPHOCYTES NFR BLD: 6.8 % (ref 18–48)
MAGNESIUM SERPL-MCNC: 1.8 MG/DL (ref 1.6–2.6)
MCH RBC QN AUTO: 27.6 PG (ref 27–31)
MCHC RBC AUTO-ENTMCNC: 29.6 G/DL (ref 32–36)
MCV RBC AUTO: 93 FL (ref 82–98)
MONOCYTES # BLD AUTO: 1.1 K/UL (ref 0.3–1)
MONOCYTES NFR BLD: 9.3 % (ref 4–15)
NEUTROPHILS # BLD AUTO: 9.7 K/UL (ref 1.8–7.7)
NEUTROPHILS NFR BLD: 79.3 % (ref 38–73)
NRBC BLD-RTO: 0 /100 WBC
PHOSPHATE SERPL-MCNC: 4.3 MG/DL (ref 2.7–4.5)
PLATELET # BLD AUTO: 616 K/UL (ref 150–450)
PMV BLD AUTO: 8.9 FL (ref 9.2–12.9)
POTASSIUM SERPL-SCNC: 4.6 MMOL/L (ref 3.5–5.1)
RBC # BLD AUTO: 3.08 M/UL (ref 4–5.4)
SODIUM SERPL-SCNC: 137 MMOL/L (ref 136–145)
VANCOMYCIN TROUGH SERPL-MCNC: 33.9 UG/ML (ref 10–22)
WBC # BLD AUTO: 12.25 K/UL (ref 3.9–12.7)

## 2023-02-17 PROCEDURE — 25000003 PHARM REV CODE 250: Performed by: HOSPITALIST

## 2023-02-17 PROCEDURE — 25000003 PHARM REV CODE 250: Performed by: NURSE PRACTITIONER

## 2023-02-17 PROCEDURE — 25000003 PHARM REV CODE 250: Performed by: STUDENT IN AN ORGANIZED HEALTH CARE EDUCATION/TRAINING PROGRAM

## 2023-02-17 PROCEDURE — 25000003 PHARM REV CODE 250: Performed by: INTERNAL MEDICINE

## 2023-02-17 PROCEDURE — 97535 SELF CARE MNGMENT TRAINING: CPT

## 2023-02-17 PROCEDURE — 36415 COLL VENOUS BLD VENIPUNCTURE: CPT | Performed by: STUDENT IN AN ORGANIZED HEALTH CARE EDUCATION/TRAINING PROGRAM

## 2023-02-17 PROCEDURE — 93010 ELECTROCARDIOGRAM REPORT: CPT | Mod: ,,, | Performed by: INTERNAL MEDICINE

## 2023-02-17 PROCEDURE — 25000003 PHARM REV CODE 250

## 2023-02-17 PROCEDURE — 85025 COMPLETE CBC W/AUTO DIFF WBC: CPT | Performed by: HOSPITALIST

## 2023-02-17 PROCEDURE — 80069 RENAL FUNCTION PANEL: CPT | Performed by: HOSPITALIST

## 2023-02-17 PROCEDURE — 36415 COLL VENOUS BLD VENIPUNCTURE: CPT | Performed by: HOSPITALIST

## 2023-02-17 PROCEDURE — 93005 ELECTROCARDIOGRAM TRACING: CPT

## 2023-02-17 PROCEDURE — 83735 ASSAY OF MAGNESIUM: CPT | Performed by: HOSPITALIST

## 2023-02-17 PROCEDURE — 27000221 HC OXYGEN, UP TO 24 HOURS

## 2023-02-17 PROCEDURE — 80202 ASSAY OF VANCOMYCIN: CPT | Performed by: STUDENT IN AN ORGANIZED HEALTH CARE EDUCATION/TRAINING PROGRAM

## 2023-02-17 PROCEDURE — 93010 EKG 12-LEAD: ICD-10-PCS | Mod: ,,, | Performed by: INTERNAL MEDICINE

## 2023-02-17 PROCEDURE — 99900035 HC TECH TIME PER 15 MIN (STAT)

## 2023-02-17 PROCEDURE — A4216 STERILE WATER/SALINE, 10 ML: HCPCS | Performed by: NURSE PRACTITIONER

## 2023-02-17 RX ORDER — LEVOTHYROXINE SODIUM 50 UG/1
50 TABLET ORAL
Qty: 30 TABLET | Refills: 0 | Status: SHIPPED | OUTPATIENT
Start: 2023-02-18 | End: 2023-02-17 | Stop reason: SDUPTHER

## 2023-02-17 RX ORDER — PROMETHAZINE HYDROCHLORIDE AND CODEINE PHOSPHATE 6.25; 1 MG/5ML; MG/5ML
5 SOLUTION ORAL EVERY 4 HOURS PRN
Qty: 118 ML | Refills: 0 | Status: SHIPPED | OUTPATIENT
Start: 2023-02-17 | End: 2023-02-24

## 2023-02-17 RX ORDER — METOPROLOL SUCCINATE 25 MG/1
25 TABLET, EXTENDED RELEASE ORAL DAILY
Qty: 30 TABLET | Refills: 0 | Status: SHIPPED | OUTPATIENT
Start: 2023-02-17 | End: 2023-02-17 | Stop reason: SDUPTHER

## 2023-02-17 RX ORDER — DIGOXIN 250 MCG
0.25 TABLET ORAL DAILY
Qty: 30 TABLET | Refills: 0 | Status: SHIPPED | OUTPATIENT
Start: 2023-02-18 | End: 2023-02-17 | Stop reason: SDUPTHER

## 2023-02-17 RX ORDER — METOPROLOL SUCCINATE 25 MG/1
25 TABLET, EXTENDED RELEASE ORAL DAILY
Status: DISCONTINUED | OUTPATIENT
Start: 2023-02-17 | End: 2023-02-17 | Stop reason: HOSPADM

## 2023-02-17 RX ORDER — LEVOTHYROXINE SODIUM 50 UG/1
50 TABLET ORAL
Qty: 30 TABLET | Refills: 0 | Status: SHIPPED | OUTPATIENT
Start: 2023-02-18 | End: 2023-03-20

## 2023-02-17 RX ORDER — DIGOXIN 250 MCG
0.25 TABLET ORAL DAILY
Qty: 30 TABLET | Refills: 0 | Status: SHIPPED | OUTPATIENT
Start: 2023-02-18 | End: 2023-03-20

## 2023-02-17 RX ORDER — METOPROLOL SUCCINATE 25 MG/1
25 TABLET, EXTENDED RELEASE ORAL DAILY
Qty: 30 TABLET | Refills: 0 | Status: SHIPPED | OUTPATIENT
Start: 2023-02-17 | End: 2023-03-19

## 2023-02-17 RX ADMIN — DULOXETINE 60 MG: 60 CAPSULE, DELAYED RELEASE ORAL at 08:02

## 2023-02-17 RX ADMIN — LEVOTHYROXINE SODIUM 50 MCG: 50 TABLET ORAL at 05:02

## 2023-02-17 RX ADMIN — ACETAMINOPHEN 650 MG: 325 TABLET ORAL at 05:02

## 2023-02-17 RX ADMIN — Medication 10 ML: at 12:02

## 2023-02-17 RX ADMIN — ATORVASTATIN CALCIUM 20 MG: 20 TABLET, FILM COATED ORAL at 08:02

## 2023-02-17 RX ADMIN — Medication 10 ML: at 06:02

## 2023-02-17 RX ADMIN — METOPROLOL SUCCINATE 25 MG: 25 TABLET, EXTENDED RELEASE ORAL at 11:02

## 2023-02-17 RX ADMIN — POLYETHYLENE GLYCOL 3350 17 G: 17 POWDER, FOR SOLUTION ORAL at 09:02

## 2023-02-17 RX ADMIN — DIGOXIN 0.25 MG: 125 TABLET ORAL at 08:02

## 2023-02-17 RX ADMIN — PROMETHAZINE HYDROCHLORIDE AND CODEINE PHOSPHATE 5 ML: 6.25; 1 SOLUTION ORAL at 05:02

## 2023-02-17 RX ADMIN — GABAPENTIN 400 MG: 400 CAPSULE ORAL at 08:02

## 2023-02-17 RX ADMIN — BUPROPION HYDROCHLORIDE 300 MG: 150 TABLET, FILM COATED, EXTENDED RELEASE ORAL at 08:02

## 2023-02-17 NOTE — ASSESSMENT & PLAN NOTE
On 02/09, HR noted in 130s to 150s.  Patient remained asymptomatic, denied palpitations, chest pain, shortness of breath, lightheadedness or dizziness.  EKG obtained, consistent with a flutter with RVR.  Consulted EP; Started metoprolol 12.5 b.i.d, uptitrating. Holding anticoagulation given hemoptysis.  2/11 loaded with dig and started PO  Increasing metoprolol to 50 mg qid  Now rate controlled -> reduce to 50 XL daily, continue digoxin.   Cardiology has signed off.

## 2023-02-17 NOTE — PLAN OF CARE
Recommendations     1. Continue current Regular diet, add Boost Plus ONS BID if PO intake declines.   2. RD to monitor & follow-up.     Goals: Meet % EEN, EPN by RD f/u date  Nutrition Goal Status: new  Communication of RD Recs: reviewed with RN

## 2023-02-17 NOTE — CONSULTS
Therapy with vancomycin complete and/or consult discontinued by provider. Pharmacy will sign off, please re-consult as needed.    Indigo Oliver, PharmD, BCPS  [id number]

## 2023-02-17 NOTE — ASSESSMENT & PLAN NOTE
5 Now with borderline bp readings.  Reduce losartan dose with hold parameters.  On metoprolol 50 qid for tachyarrhythmia now controlled, review of administration hx reveal she is only requiring 50 daily (rest held due to bp and HR readings). Change metoprolol tartrate-> XL at 50 daily starting tomorrow.  VS per unit routine.

## 2023-02-17 NOTE — PT/OT/SLP PROGRESS
"Occupational Therapy   Treatment    Name: Skyla Ernandez  MRN: [id number]  Admitting Diagnosis:  Hemoptysis       Recommendations:     Discharge Recommendations: home health OT  Discharge Equipment Recommendations:  walker, rolling  Barriers to discharge:  None    Assessment:     Skyla Ernandez is a 52 y.o. female with a medical diagnosis of Hemoptysis.  Pt tolerated session well, ambulating to and from the bathroom ~20 ft in total with CGA-SBA with RW and performing grooming tasks while standing at the sink with SBA.  She attempted to perform bed mobility on room air, but her oxygen saturation rate decreased to 85-86%.  3 L nasal cannula used for remainder of session.  Her oxygen sats measured 95-96% and her HR measured 104 BPM after ambulating.  She presents with the following.  Performance deficits affecting function are weakness, impaired endurance, impaired self care skills, impaired functional mobility, gait instability, impaired balance, impaired cardiopulmonary response to activity.     Rehab Prognosis:  Good; patient would benefit from acute skilled OT services to address these deficits and reach maximum level of function.       Plan:     Patient to be seen 3 x/week to address the above listed problems via self-care/home management, therapeutic activities, therapeutic exercises  Plan of Care Expires: 03/14/23  Plan of Care Reviewed with: patient, spouse    Subjective   "I might be going home today."  Pain/Comfort:  Pain Rating 1: other (see comments) (not rated)  Location 1:  ("headache due to being up since 330 am")  Pain Addressed 1: Distraction  Pain Rating Post-Intervention 1: other (see comments) (unchanged)    Objective:     Communicated with: nursing prior to session.  Patient found HOB elevated with telemetry, pulse ox (continuous), PureWick, oxygen upon with her  present OT entry to room.    General Precautions: Standard, fall    Orthopedic Precautions:N/A  Braces: N/A  Respiratory Status: Nasal " cannula, flow 3 L/min with humification     Occupational Performance:     Bed Mobility:    Patient completed Rolling/Turning to Left with  stand by assistance  Patient completed Scooting/Bridging with stand by assistance  Patient completed Supine to Sit with stand by assistance     Functional Mobility/Transfers:  Patient completed Sit <> Stand Transfer from EOB x 1 trial with stand by assistance with rolling walker and cueing to push up from the bed with one UE for safety  Patient completed Bed <> Chair Transfer using Step Transfer technique with CGA-SBA with rolling walker  Functional Mobility: Pt ambulated ~20 ft with CGA-SBA with RW.  She had no LOB and reported no dizziness.    Activities of Daily Living:  Grooming: stand by assistance to wash her face and brush her teeth while standing at bathroom sink  Toileting:  Pt performed with nursing prior to session and had a BM, per pt report.  She disconnected her PureWick without assistance.    [facility] 6 Click ADL:      Treatment & Education:  Pt and her  edu on role of OT, POC, safety when performing self care tasks, benefit of performing OOB activity, and safety when performing functional transfers and mobility.    - Self care tasks completed-- as noted above      Patient left up in chair with all lines intact, call button in reach, nursing notified, and her  and nursing present    GOALS:   Multidisciplinary Problems       Occupational Therapy Goals          Problem: Occupational Therapy    Goal Priority Disciplines Outcome Interventions   Occupational Therapy Goal     OT, PT/OT Ongoing, Progressing    Description: Goals to be met by: 02/19/2023     Patient will increase functional independence with ADLs by performing:    LE Dressing with Contact Guard Assistance.  Grooming while standing at sink with Contact Guard Assistance. - Met  Toileting from toilet with Stand-by Assistance for hygiene and clothing management.                          Time  Tracking:     OT Date of Treatment: 02/17/23  OT Start Time: 1046  OT Stop Time: 1104  OT Total Time (min): 18 min    Billable Minutes:Self Care/Home Management 18 min    OT/AUGUSTO: OT          2/17/2023

## 2023-02-17 NOTE — PHYSICIAN QUERY
PT Name: Skyla Ernandez  MR #: [id number]     DOCUMENTATION CLARIFICATION      CDS/: Reyna Campos RN, BSN               Contact information: [email or web address]   This form is a permanent document in the medical record.     Query Date: February 17, 2023    By submitting this query, we are merely seeking further clarification of documentation.  Please utilize your independent clinical judgment when addressing the question(s) below.     The Medical Record contains the following:    Clinical Information Location in Medical Record   Hemoptysis  3 weeks of cough in which she first noticed blood tinged sputum 2 weeks ago. She reports that the cough has been productive of whitish brown sputum which became darker and eventually appeared dark red.     Acute hypoxemic respiratory failure  Likely due to hemoptysis, lung mass, and possible pneumonia     Mass of upper lobe of right lung  Solid mass in the superior aspect of the right lung concerning for malignancy. Mass is partially necrotic with air bubbles peripherally. Mass abuts the pericardium and mediastinum. There is associated atelectasis/consolidation of the right middle lobe. Patchy opacities in the right lower lobe as well as the left lung which is nonspecific but may represent atelectasis, infectious process, or hemorrhage. She was started on levofloxacin.     Suspect this is multifactorial given findings on chest CT:  Mass, post obstructive pneumonia    BAL: 60cc instilled.  30cc returned.  Indication: hemoptysis, pneumonia    Paged by nursing that this evening approx 1915 patient with coughing spell x 10 minutes   Family at bedside reports pt coughing into paper towels - thin blood secretions.  In a bedside container they estimate total she may have coughed up is 100-200cc.      Pt admitted with hemoptysis suspected due to new right lung mass   H&P, 2/6                          Critical care, 2/7    Bronchoscopy, 2/7      Significant event note, 2/8      Please document your best medical opinion regarding the etiology of Hemoptysis?       [ x  ] Right Upper Lobe Lung Mass     [   ] Pneumonia     [   ] Other etiology (please specify):___________________     [  ] Clinically Undetermined             Please document in your progress notes daily for the duration of treatment, until resolved, and include in your discharge summary.

## 2023-02-17 NOTE — PLAN OF CARE
Pt's primary care office will make contact with pt to schedule her hospital f/u visit.  CHW scheduled pt's hospital f/u with Pulmonology on 2/22/23 @ 10:00 am and Cardiology 2/27/23 @ 8:00 am.

## 2023-02-17 NOTE — PROGRESS NOTES
"[facility] - Telemetry Stepdown  Adult Nutrition  Progress Note    SUMMARY       Recommendations    1. Continue current Regular diet, add Boost Plus ONS BID if PO intake declines.   2. RD to monitor & follow-up.    Goals: Meet % EEN, EPN by RD f/u date  Nutrition Goal Status: new  Communication of RD Recs: reviewed with RN    Assessment and Plan    No nutritional dx at this time.     Reason for Assessment    Reason For Assessment: length of stay  Diagnosis: other (see comments) (Hemoptysis)  Relevant Medical History: HTN, HLD  Interdisciplinary Rounds: did not attend    General Information Comments: Pt scheduled to discharge today. Per RN documentation, pt tolerating diet w/ 75% PO intake. UBW: 220# per chart review. No indicators of malnutrition noted.  Nutrition Discharge Planning: Adequate PO intake    Nutrition/Diet History    Spiritual, Cultural Beliefs, Tenriism Practices, Values that Affect Care: no  Factors Affecting Nutritional Intake: None identified at this time    Anthropometrics    Temp: 97.9 °F (36.6 °C)  Height: 5' 5" (165.1 cm)  Height (inches): 65 in  Weight Method: Bed Scale  Weight: 98 kg (216 lb 0.8 oz)  Weight (lb): 216.05 lb  Ideal Body Weight (IBW), Female: 125 lb  % Ideal Body Weight, Female (lb): 172.84 %  BMI (Calculated): 36  BMI Grade: 35 - 39.9 - obesity - grade II    Lab/Procedures/Meds    Pertinent Labs Reviewed: reviewed  Pertinent Labs Comments: GFR 59.7  Pertinent Medications Reviewed: reviewed  Pertinent Medications Comments: Statin    Estimated/Assessed Needs    Weight Used For Calorie Calculations: 98 kg (216 lb 0.8 oz)    Energy Calorie Requirements (kcal): 1581 kcal/d  Energy Need Method: [place]-St Jeor (1.0 PAL)    Protein Requirements: 88 g/d (.9 g/kg)  Weight Used For Protein Calculations: 98 kg (216 lb 0.8 oz)    Estimated Fluid Requirement Method: other (see comments) (Per MD or 1 mL/kcal)  RDA Method (mL): 1581    Nutrition Prescription Ordered    Current Diet " Order: Regular    Evaluation of Received Nutrient/Fluid Intake    I/O: -10.5L since admit    Comments: LBM: 2/11    Tolerance: tolerating    Nutrition Risk    Level of Risk/Frequency of Follow-up:  (1x/week)     Monitor and Evaluation    Food and Nutrient Intake: energy intake, food and beverage intake  Food and Nutrient Adminstration: diet order  Physical Activity and Function: nutrition-related ADLs and IADLs  Anthropometric Measurements: weight, weight change  Biochemical Data, Medical Tests and Procedures: lipid profile, inflammatory profile, glucose/endocrine profile, gastrointestinal profile, electrolyte and renal panel  Nutrition-Focused Physical Findings: overall appearance     Nutrition Follow-Up    RD Follow-up?: Yes

## 2023-02-17 NOTE — PROGRESS NOTES
"[facility] - Telemetry [facility] Medicine  Progress Note    Patient Name: Skyla Ernandez  MRN: [id number]  Patient Class: IP- Inpatient   Admission Date: 2/6/2023  Length of Stay: 10 days  Attending Physician: Karlie Quigley MD  Primary Care Provider: Primary Doctor No        Subjective:     Principal Problem:Hemoptysis        HPI:  Per ICU hand off : " 53 yo female with hypertension, hyperlipidemia, and thyroid disease who presented to [facility] Emergency Department this morning for hemoptysis. She reports 3 weeks of cough in which she first noticed blood tinged sputum 2 weeks ago. She reports that the cough has been productive of whitish brown sputum which became darker and eventually appeared dark red. The cough often interferes with sleep. She has also experienced shortness of breath over the past couple of days. She endorses sore throat, RUSSELL, and 2-3 pillow orthopnea. This morning she coughed up over a cup of bright red blood and decided to present to the ED. She denies any chronic tobacco history apart from occasional smoking cigarettes in her late teens. She denies piror homelessness, incarceration, close contacts with TB, or recent travel. She denies having a history of asthma, COPD, cirrhosis or nose bleeds but does report history of scoliosis.     Workup at the [facility] ED includes leukocytosis to 35K, hemoglobin of 6.8 now s/p 1 unit of pRBC's, thrombocytosis to 882, NT-proBNP 1640, troponin negative, and lactic acid 1.8. CXR diffuse patchy opacities scattered throughout the right middle and right lower lung zone which may be seen with pneumonia, aspiration, or edema. CTA of the chest showed no evidence of pulmonary embolism. Solid mass in the superior aspect of the right lung concerning for malignancy. Mass is partially necrotic with air bubbles peripherally. Mass abuts the pericardium and mediastinum. There is associated atelectasis/consolidation of the right middle lobe. " "Patchy opacities in the right lower lobe as well as the left lung which is nonspecific but may represent atelectasis, infectious process, or hemorrhage. She was started on levofloxacin. She was noted to be tachycardic to 100's with soft pressures 87//73 and stable oxygenation on 2LNC.     Patient [place] [facility] [place] for higher level of care."      Overview/Hospital Course:  Per ICU hand off: Patient admitted to MICU for further evaluation and management of hemoptysis. Hb of 6.7, received a second unit of pRBC's as patient continued to have cough with blood tinged sputum. On levofloxacin and vancomycin given leukocytosis, fever, and concerned for pneumonia. Patient remains HDS and satting well on 4L NC. Hypoxic respiratory failure likely 2/2 pneumonia vs hemoptysis vs lung mass. S/p bronchoscopy on 2/7 with BAL performed which was significant for grossly normal airways apart from sticky thick white mucus. Results from BAL unremarkable so far and cultures NGTD. Patient continues to be HDS and satting well on LFNC. Hb stable without any further hemoptysis for over 24 hours. Patient is medically ready for step down to hospital medicine.  2/9: Overnight events of hemoptysis noted.  Patient reports  soreness in lateral chest wall muscles from cough.  Heart rate noted in 130s to 150s.  EKG consistent with a flutter.  Starting metoprolol 12.5 b.i.d..  Holding Eliquis in the setting of hemoptysis. Consulted EP    2/10: Received 1 u blood yesterday with improvement in Hb. Denies further hemoptysis. SOB improved. Weaning oxygen. Afebrile  HR persistently elevated aflutter, increasing metoprolol.  Denies chest pain, denies palpitations.     2/13: Rate controlled this am with HR 70-80; pt reports improvement in shortness of breath  Improving cough and states does occasionally have small amount of pinkish tinge to sputum, no overt bleeding    2/14: Patient reports having had episode of hemoptysis " last night and again this morning.  Hb 8.7-->7.5-->8.1  Hemodynamically stable, afebrile  Denies worsening shortness of breath      Interval History: lower than usual bp readings reported to me by nursing staff, patient reports improvement overall. Waiting on oxygen to be delivered.     Review of Systems   All other systems reviewed and are negative.  Objective:     Vital Signs (Most Recent):  Temp: 98 °F (36.7 °C) (02/16/23 2011)  Pulse: 74 (02/16/23 2011)  Resp: 16 (02/16/23 2011)  BP: 118/60 (02/16/23 2011)  SpO2: (!) 92 % (02/16/23 2011)   Vital Signs (24h Range):  Temp:  [97.8 °F (36.6 °C)-99.2 °F (37.3 °C)] 98 °F (36.7 °C)  Pulse:  [] 74  Resp:  [16-20] 16  SpO2:  [90 %-100 %] 92 %  BP: ()/(51-79) 118/60     Weight: 98 kg (216 lb 0.8 oz)  Body mass index is 35.95 kg/m².    Intake/Output Summary (Last 24 hours) at 2/16/2023 2020  Last data filed at 2/16/2023 1430  Gross per 24 hour   Intake 1616.12 ml   Output 2000 ml   Net -383.88 ml      Physical Exam  Vitals reviewed.   Constitutional:       General: She is not in acute distress.     Appearance: She is not toxic-appearing.   HENT:      Head: Normocephalic and atraumatic.   Eyes:      Extraocular Movements: Extraocular movements intact.   Cardiovascular:      Rate and Rhythm: Normal rate. Rhythm irregular.   Pulmonary:      Effort: Pulmonary effort is normal. No respiratory distress.      Breath sounds: No rhonchi or rales.      Comments: Occ exp wheeze   Abdominal:      Palpations: Abdomen is soft.      Tenderness: There is no abdominal tenderness.   Musculoskeletal:      Right lower leg: No edema.      Left lower leg: No edema.   Skin:     General: Skin is warm and dry.   Neurological:      General: No focal deficit present.      Mental Status: She is alert. Mental status is at baseline.   Psychiatric:         Behavior: Behavior normal.         Thought Content: Thought content normal.       MELD-Na score: 8 at 2/13/2023  5:33 AM  MELD score: 8  at 2/13/2023  5:33 AM  Calculated from:  Serum Creatinine: 0.6 mg/dL (Using min of 1 mg/dL) at 2/13/2023  5:33 AM  Serum Sodium: 138 mmol/L (Using max of 137 mmol/L) at 2/13/2023  5:33 AM  Total Bilirubin: 0.4 mg/dL (Using min of 1 mg/dL) at 2/13/2023  5:33 AM  INR(ratio): 1.2 at 2/11/2023  1:40 AM  Age: 52 years    Significant Labs:  CBC:  Recent Labs   Lab 02/15/23  1657 02/15/23  2342 02/16/23  0828   WBC 19.24* 15.05* 16.82*   HGB 8.8* 8.2* 9.6*   HCT 29.2* 25.6* 31.5*   * 494* 622*     CMP:  Recent Labs   Lab 02/15/23  0619 02/16/23  0323 02/16/23  0632   * 137  --    K 4.6 6.3* 4.6   CL 91* 92*  --    CO2 38* 32*  --    * 89  --    BUN 18 16  --    CREATININE 0.9 1.1  --    CALCIUM 9.0 9.4  --    PROT 5.7* 6.4  --    ALBUMIN 1.6* 1.8*  --    BILITOT 0.2 0.4  --    ALKPHOS 128 133  --    AST 26 31  --    ALT 50* 44  --    ANIONGAP 6* 13  --      PTINR:  No results for input(s): INR in the last 48 hours.    Significant Procedures:   Dobutamine Stress Test with Color Flow: No results found for this or any previous visit.    None      Assessment/Plan:      Neuro  Neuropathy  - home gabapentin, amitriptyline, duloxetine       Psychiatric  Anxiety and depression  - continue home bupropion, amitriptyline, clonazepam, and duloxetine.        Pulmonary  * Hemoptysis  Patient with cough for 3 weeks and coughed up a cup of blood prior to presentation  Not on blood thinners, non-tobacco smoker, heavy marijuana smoker, no lung disease history   CTA at outside hospital negative for PE but there was a solid mass in the superior aspect of the right lung concerning for malignancy.  Mass is partially necrotic with air bubbles peripherally.    At outside hospital Hg 6.8, was given 1 unit pRBC    Was also given nebulized TXA at outside hospital   2/8: Hemoglobin remaining stable with resolution of hemoptysis    - Discontinue nebulized TXA  - Monitor oxygen   - s/p bronch on 2/7, see procedure note  - Consult IR  for possible emoblization if uncontrolled bleeding   - prn codeine-promethazine for cough    Resolved hemoptysis, stable H/H, needs oxygen at discharge.       Acute hypoxemic respiratory failure  Likely due to hemoptysis, lung mass, and possible pneumonia      - Vanc/levofloxacin   - CBC daily   - Supplemental O2 as needed     Mass of upper lobe of right lung  CTA at outside hospital negative for PE but there was a solid mass in the superior aspect of the right lung concerning for malignancy.  Mass is partially necrotic with air bubbles peripherally.    WBC 35 and lacate normal at outside hospital      - On Vanc/levofloxacin   - FU cultures   - s/p bronch on 2/7, no mass visualized in airways to biopsy but BAL performed with results pending  - per pulm recommend full treatment with antibiotics and a follow up in pulmonary with repeat CT Scan in a few weeks time to determine which aspects of the abnormalities visualized on CT are pneumonia vs. Potential mass    Cardiac/Vascular  Acute systolic heart failure  Results for orders placed during the hospital encounter of 02/06/23  On losartan, BB  Trial Lasix     Echo    Interpretation Summary  · Atrial fibrillation ( or Flutter?) observed.  · The left ventricle is mildly enlarged with moderately decreased systolic function.  · The estimated ejection fraction is 35%.  · There is abnormal septal wall motion.  · Mild left atrial enlargement.  · Mild right ventricular enlargement with moderately to severely reduced right ventricular systolic function.  · Elevated central venous pressure (15 mmHg).  · Mild to moderate tricuspid regurgitation.  · Mild pulmonic regurgitation.  · The estimated PA systolic pressure is 56 mmHg.  · There is moderate pulmonary hypertension.  · Small posterior pericardial effusion. Trivial under the RA.        Atrial flutter  On 02/09, HR noted in 130s to 150s.  Patient remained asymptomatic, denied palpitations, chest pain, shortness of breath,  lightheadedness or dizziness.  EKG obtained, consistent with a flutter with RVR.  Consulted EP; Started metoprolol 12.5 b.i.d, uptitrating. Holding anticoagulation given hemoptysis.  2/11 loaded with dig and started PO  Increasing metoprolol to 50 mg qid  Now rate controlled -> reduce to 50 XL daily, continue digoxin.   Cardiology has signed off.      Essential hypertension  Now with borderline bp readings.  Reduce losartan dose with hold parameters.  On metoprolol 50 qid for tachyarrhythmia now controlled, review of administration hx reveal she is only requiring 50 daily (rest held due to bp and HR readings). Change metoprolol tartrate-> XL at 50 daily starting tomorrow.  VS per unit routine.      Oncology  Anemia  Hg 6.8 at outside hospital, was given 1 unit pRBC   Here with hemoptysis   Repeat Hb noted at 6.7, received a second unit of pRBC's  2/8: hemoptysis resolved and hemoglobin stable     - CBC daily   - FU coags   - Transfuse if Hg <7, platelets <10, or platelets <50 and actively bleeding       Endocrine  Thyroid disease  Continue home synthroid         VTE Risk Mitigation (From admission, onward)         Ordered     Reason for No Pharmacological VTE Prophylaxis  Once        Question:  Reasons:  Answer:  Active Bleeding    02/06/23 1846     IP VTE HIGH RISK PATIENT  Once         02/06/23 1846     Place sequential compression device  Until discontinued         02/06/23 1846                Discharge Planning   MICHELE: 2/16/2023     Code Status: Full Code   Is the patient medically ready for discharge?:     Reason for patient still in hospital (select all that apply): Patient new problem, Patient trending condition and Treatment  Discharge Plan A: Home Health                  Karlie Quigley MD  Department of Hospital Medicine   [facility] - Telemetry Stepdown

## 2023-02-17 NOTE — SUBJECTIVE & OBJECTIVE
Interval History: lower than usual bp readings reported to me by nursing staff, patient reports improvement overall. Waiting on oxygen to be delivered.     Review of Systems   All other systems reviewed and are negative.  Objective:     Vital Signs (Most Recent):  Temp: 98 °F (36.7 °C) (02/16/23 2011)  Pulse: 74 (02/16/23 2011)  Resp: 16 (02/16/23 2011)  BP: 118/60 (02/16/23 2011)  SpO2: (!) 92 % (02/16/23 2011)   Vital Signs (24h Range):  Temp:  [97.8 °F (36.6 °C)-99.2 °F (37.3 °C)] 98 °F (36.7 °C)  Pulse:  [] 74  Resp:  [16-20] 16  SpO2:  [90 %-100 %] 92 %  BP: ()/(51-79) 118/60     Weight: 98 kg (216 lb 0.8 oz)  Body mass index is 35.95 kg/m².    Intake/Output Summary (Last 24 hours) at 2/16/2023 2020  Last data filed at 2/16/2023 1430  Gross per 24 hour   Intake 1616.12 ml   Output 2000 ml   Net -383.88 ml      Physical Exam  Vitals reviewed.   Constitutional:       General: She is not in acute distress.     Appearance: She is not toxic-appearing.   HENT:      Head: Normocephalic and atraumatic.   Eyes:      Extraocular Movements: Extraocular movements intact.   Cardiovascular:      Rate and Rhythm: Normal rate. Rhythm irregular.   Pulmonary:      Effort: Pulmonary effort is normal. No respiratory distress.      Breath sounds: No rhonchi or rales.      Comments: Occ exp wheeze   Abdominal:      Palpations: Abdomen is soft.      Tenderness: There is no abdominal tenderness.   Musculoskeletal:      Right lower leg: No edema.      Left lower leg: No edema.   Skin:     General: Skin is warm and dry.   Neurological:      General: No focal deficit present.      Mental Status: She is alert. Mental status is at baseline.   Psychiatric:         Behavior: Behavior normal.         Thought Content: Thought content normal.       MELD-Na score: 8 at 2/13/2023  5:33 AM  MELD score: 8 at 2/13/2023  5:33 AM  Calculated from:  Serum Creatinine: 0.6 mg/dL (Using min of 1 mg/dL) at 2/13/2023  5:33 AM  Serum Sodium: 138  mmol/L (Using max of 137 mmol/L) at 2/13/2023  5:33 AM  Total Bilirubin: 0.4 mg/dL (Using min of 1 mg/dL) at 2/13/2023  5:33 AM  INR(ratio): 1.2 at 2/11/2023  1:40 AM  Age: 52 years    Significant Labs:  CBC:  Recent Labs   Lab 02/15/23  1657 02/15/23  2342 02/16/23  0828   WBC 19.24* 15.05* 16.82*   HGB 8.8* 8.2* 9.6*   HCT 29.2* 25.6* 31.5*   * 494* 622*     CMP:  Recent Labs   Lab 02/15/23  0619 02/16/23  0323 02/16/23  0632   * 137  --    K 4.6 6.3* 4.6   CL 91* 92*  --    CO2 38* 32*  --    * 89  --    BUN 18 16  --    CREATININE 0.9 1.1  --    CALCIUM 9.0 9.4  --    PROT 5.7* 6.4  --    ALBUMIN 1.6* 1.8*  --    BILITOT 0.2 0.4  --    ALKPHOS 128 133  --    AST 26 31  --    ALT 50* 44  --    ANIONGAP 6* 13  --      PTINR:  No results for input(s): INR in the last 48 hours.    Significant Procedures:   Dobutamine Stress Test with Color Flow: No results found for this or any previous visit.    None

## 2023-02-17 NOTE — PLAN OF CARE
[facility] - Telemetry Stepdown  Discharge Final Note    Primary Care Provider: Primary Doctor No    Expected Discharge Date: 2/17/2023      Future Appointments   Date Time Provider Department Center   2/22/2023 10:00 AM Edenilson De Jesus MD [facility] PULMS[facility]   2/27/2023  8:00 AM Benigno Leon MD [facility] CARDIO [facility]          Final Discharge Note (most recent)       Final Note - 02/17/23 1233          Final Note    Assessment Type Final Discharge Note     Anticipated Discharge Disposition Home or Self Care     What phone number can be called within the next 1-3 days to see how you are doing after discharge? [phone]     Hospital Resources/Appts/Education Provided Appointments scheduled and added to AVS        Post-Acute Status    Post-Acute Authorization Other     Other Status No Post-Acute Service Needs     Discharge Delays None known at this time                     Important Message from Medicare             Contact Info       Keely Vallejo MD   Specialty: Pulmonary Disease    [address]  [facility] [address]   Phone: [phone]       Next Steps: Follow up in 3 week(s)    Instructions: CT Scan to be performed prior appointment.    No, Primary Doctor   Relationship: PCP - General        Next Steps: Schedule an appointment as soon as possible for a visit in 1 week(s)    Instructions: Post-hospitalization follow up for hemoptysis due to right lung mass, hypertension management, repeat cbc/bmp, and cardiology referral for newly diagnosed systolic heart failure and aflutter.    No, Primary Doctor   Relationship: PCP - General        Next Steps: Follow up    Instructions: Pt's primary care office will make contact with pt to schedule her hospital f/u visit.            Dominique Barriga RN  Ext [phone]

## 2023-02-17 NOTE — ASSESSMENT & PLAN NOTE
Patient with cough for 3 weeks and coughed up a cup of blood prior to presentation  Not on blood thinners, non-tobacco smoker, heavy marijuana smoker, no lung disease history   CTA at outside hospital negative for PE but there was a solid mass in the superior aspect of the right lung concerning for malignancy.  Mass is partially necrotic with air bubbles peripherally.    At outside hospital Hg 6.8, was given 1 unit pRBC    Was also given nebulized TXA at outside hospital   2/8: Hemoglobin remaining stable with resolution of hemoptysis    - Discontinue nebulized TXA  - Monitor oxygen   - s/p bronch on 2/7, see procedure note  - Consult IR for possible emoblization if uncontrolled bleeding   - prn codeine-promethazine for cough    Resolved hemoptysis, stable H/H, needs oxygen at discharge.

## 2023-02-17 NOTE — PLAN OF CARE
Problem: Occupational Therapy  Goal: Occupational Therapy Goal  Description: Goals to be met by: 02/19/2023     Patient will increase functional independence with ADLs by performing:    LE Dressing with Contact Guard Assistance.  Grooming while standing at sink with Contact Guard Assistance. - Met  Toileting from toilet with Stand-by Assistance for hygiene and clothing management.     Outcome: Ongoing, Progressing     Continue OT POC.

## 2023-02-17 NOTE — NURSING
Patient being discharge. Discharge instruction reviewed with pt and spouse. Removed IV access and telemetry. [facility].

## 2023-02-20 ENCOUNTER — TELEPHONE (OUTPATIENT)
Dept: PULMONOLOGY | Facility: CLINIC | Age: 53
End: 2023-02-20
Payer: COMMERCIAL

## 2023-02-20 NOTE — TELEPHONE ENCOUNTER
I spoke with patient in regards to cancelling her appointments. Patient appointments was already cancelled by Comfort Hernandez.

## 2023-03-08 LAB — FUNGUS SPEC CULT: ABNORMAL

## 2023-03-20 NOTE — DISCHARGE SUMMARY
"[facility] - Telemetry [facility] Medicine  Discharge Summary      Patient Name: Skyla Ernandez  MRN: [id number]  AMMY: [id number]  Patient Class: IP- Inpatient  Admission Date: 2/6/2023  Hospital Length of Stay: 11 days  Discharge Date and Time: 2/17/2023  2:17 PM  Discharging Provider: Karlie Quigley MD  Primary Care Provider: Primary Doctor [facility] Medicine Team: [facility] HOSP MED A Karlie Quigley MD  Primary Care Team: [facility] HOSP MED A    HPI:   Per ICU hand off : " 51 yo female with hypertension, hyperlipidemia, and thyroid disease who presented to [facility] Emergency Department this morning for hemoptysis. She reports 3 weeks of cough in which she first noticed blood tinged sputum 2 weeks ago. She reports that the cough has been productive of whitish brown sputum which became darker and eventually appeared dark red. The cough often interferes with sleep. She has also experienced shortness of breath over the past couple of days. She endorses sore throat, RUSSELL, and 2-3 pillow orthopnea. This morning she coughed up over a cup of bright red blood and decided to present to the ED. She denies any chronic tobacco history apart from occasional smoking cigarettes in her late teens. She denies piror homelessness, incarceration, close contacts with TB, or recent travel. She denies having a history of asthma, COPD, cirrhosis or nose bleeds but does report history of scoliosis.     Workup at the [facility] ED includes leukocytosis to 35K, hemoglobin of 6.8 now s/p 1 unit of pRBC's, thrombocytosis to 882, NT-proBNP 1640, troponin negative, and lactic acid 1.8. CXR diffuse patchy opacities scattered throughout the right middle and right lower lung zone which may be seen with pneumonia, aspiration, or edema. CTA of the chest showed no evidence of pulmonary embolism. Solid mass in the superior aspect of the right lung concerning for malignancy. Mass is partially necrotic with air bubbles peripherally. " "Mass abuts the pericardium and mediastinum. There is associated atelectasis/consolidation of the right middle lobe. Patchy opacities in the right lower lobe as well as the left lung which is nonspecific but may represent atelectasis, infectious process, or hemorrhage. She was started on levofloxacin. She was noted to be tachycardic to 100's with soft pressures 87//73 and stable oxygenation on 2LNC.     Patient transferred to [facility] medical intensive care unit for higher level of care."      * No surgery found *      Hospital Course:   Per ICU hand off: Patient admitted to MICU for further evaluation and management of hemoptysis. Hb of 6.7, received a second unit of pRBC's as patient continued to have cough with blood tinged sputum. On levofloxacin and vancomycin given leukocytosis, fever, and concerned for pneumonia. Patient remains HDS and satting well on 4L NC. Hypoxic respiratory failure likely 2/2 pneumonia vs hemoptysis vs lung mass. S/p bronchoscopy on 2/7 with BAL performed which was significant for grossly normal airways apart from sticky thick white mucus. Results from BAL unremarkable so far and cultures NGTD. Patient continues to be HDS and satting well on LFNC. Hb stable without any further hemoptysis for over 24 hours. Patient is medically ready for step down to hospital medicine.  2/9: Overnight events of hemoptysis noted.  Patient reports  soreness in lateral chest wall muscles from cough.  Heart rate noted in 130s to 150s.  EKG consistent with a flutter.  Starting metoprolol 12.5 b.i.d..  Holding Eliquis in the setting of hemoptysis. Consulted EP    2/10: Received 1 u blood yesterday with improvement in Hb. Denies further hemoptysis. SOB improved. Weaning oxygen. Afebrile  HR persistently elevated aflutter, increasing metoprolol.  Denies chest pain, denies palpitations.     2/13: Rate controlled this am with HR 70-80; pt reports improvement in shortness of breath  Improving cough and states does " occasionally have small amount of pinkish tinge to sputum, no overt bleeding    2/14: Patient reports having had episode of hemoptysis last night and again this morning.  Hb 8.7-->7.5-->8.1  Hemodynamically stable, afebrile  Denies worsening shortness of breath    2/15-2/17: cardiac meds adjusted due to low bp readings, losartan stopped and discharged on metoprolol XL 25 mg qam + digoxin. AC on hold due to hemoptysis with anemia. Needs Cardiology clinic f/u (referral sent here) for chf and aflutter management. Pulmonology clinic f/u for lung mass. Completed 12 days of IV abx which is more than adequate for pna tx. BAL - no malignant cells, cxs negative (yeast). PCP f/u with repeat labs in 1-2 weeks.        Goals of Care Treatment Preferences:  Code Status: Full Code      Consults:   Consults (From admission, onward)        Status Ordering Provider     Inpatient consult to Electrophysiology  Once        Provider:  (Not yet assigned)    Completed KANDACE SONG     Inpatient consult to PICC team (NIAS)  Once        Provider:  (Not yet assigned)    Completed LONNY WELSH     Pharmacy to dose Vancomycin consult  Once        Provider:  (Not yet assigned)   See Hyperspace for full Linked Orders Report.    Completed SAIRA HERRING new Assessment & Plan notes have been filed under this hospital service since the last note was generated.  Service: Hospital Medicine    Final Active Diagnoses:    Diagnosis Date Noted POA    PRINCIPAL PROBLEM:  Hemoptysis [R04.2] 02/06/2023 Yes    Acute systolic heart failure [I50.21] 02/10/2023 Yes    Atrial flutter [I48.92] 02/09/2023 No    Neuropathy [G62.9] 02/07/2023 Yes    Anxiety and depression [F41.9, F32.A] 02/07/2023 Yes    Anemia [D64.9] 02/06/2023 Yes    Thyroid disease [E07.9] 02/06/2023 Yes    Mass of upper lobe of right lung [R91.8] 02/06/2023 Yes    Acute hypoxemic respiratory failure [J96.01] 02/06/2023 Yes    Essential hypertension [I10]  "02/06/2023 Yes      Problems Resolved During this Admission:       Discharged Condition: stable    Disposition: Home or Self Care    Follow Up:   Follow-up Information     Leydi Owens MD Follow up in 3 week(s).    Specialty: Pulmonary Disease  Why: CT Scan to be performed prior appointment.  Contact information:  [address]  [place] [address]  [phone]             Primary Doctor No. Schedule an appointment as soon as possible for a visit in 1 week(s).    Why: Post-hospitalization follow up for hemoptysis due to right lung mass, hypertension management, repeat cbc/bmp, and cardiology referral for newly diagnosed systolic heart failure and aflutter.           Primary Doctor No Follow up.    Why: Pt's primary care office will make contact with pt to schedule her hospital f/u visit.                     Patient Instructions:      OXYGEN FOR HOME USE     Order Specific Question Answer Comments   Liter Flow 2    Duration With activity    Qualifying Test Performed at: Activity    Oxygen saturation at rest 89    Oxygen saturation with activity 87    Oxygen saturation with activity on oxygen 94    Portable mode: continuous    Route nasal cannula    Device: home concentrator with portable tanks    Length of need (in months): 99 mos    Patient condition with qualifying saturation Other - List qualifying diagnosis and code    Select a diagnosis & list the code in the comments Acute hypoxemic respiratory failure [5168426]    Height: 5' 5" (1.651 m)    Weight: 98 kg (216 lb 0.8 oz)    Alternative treatment measures have been tried or considered and deemed clinically ineffective. Yes    Vendor: Other (use comments) patient lives in [place] - out of service area   CRM Resolution Date: 2/15/2023      Ambulatory referral/consult to Pulmonology   Standing Status: Future   Referral Priority: Routine Referral Type: Consultation   Referral Reason: Specialty Services Required   Referred to Provider: LEYDI OWENS " Requested Specialty: Pulmonary Disease   Number of Visits Requested: 1     Ambulatory referral/consult to Cardiology   Standing Status: Future   Referral Priority: Routine Referral Type: Consultation   Referral Reason: Specialty Services Required   Requested Specialty: Cardiology   Number of Visits Requested: 1       Pending Diagnostic Studies:     None         Medications:  Reconciled Home Medications:      Medication List      START taking these medications    digoxin 250 mcg tablet  Commonly known as: LANOXIN  Take 1 tablet (0.25 mg total) by mouth once daily.     metoprolol succinate 25 MG 24 hr tablet  Commonly known as: TOPROL-XL  Take 1 tablet (25 mg total) by mouth once daily.        CHANGE how you take these medications    levothyroxine 50 MCG tablet  Commonly known as: SYNTHROID  Take 1 tablet (50 mcg total) by mouth before breakfast.  What changed: See the new instructions.        CONTINUE taking these medications    acetaminophen 500 MG tablet  Commonly known as: TYLENOL  Take 500 mg by mouth every 6 (six) hours as needed for Pain.     amitriptyline 75 MG tablet  Commonly known as: ELAVIL  Take 75 mg by mouth every evening.     atorvastatin 20 MG tablet  Commonly known as: LIPITOR  Take 20 mg by mouth once daily.     buPROPion 300 MG 24 hr tablet  Commonly known as: WELLBUTRIN XL  Take 300 mg by mouth once daily.     clonazePAM 0.5 MG tablet  Commonly known as: KlonoPIN  Take 0.5 mg by mouth 3 (three) times daily as needed.     DULoxetine 60 MG capsule  Commonly known as: CYMBALTA  Take 60 mg by mouth 2 (two) times daily.     gabapentin 400 MG capsule  Commonly known as: NEURONTIN  Take 400 mg by mouth 3 (three) times daily.     ONE-A-DAY WOMEN'S 50 PLUS 400-20 mcg Tab  Generic drug: mv,Ca,min-folic acid-vit K1  Take 1 tablet by mouth once daily.     tiZANidine 4 MG tablet  Commonly known as: ZANAFLEX  Take 4 mg by mouth 2 (two) times daily.        STOP taking these medications    diclofenac 75 MG EC  tablet  Commonly known as: VOLTAREN     hydroCHLOROthiazide 25 MG tablet  Commonly known as: HYDRODIURIL     losartan 100 MG tablet  Commonly known as: COZAAR        ASK your doctor about these medications    promethazine-codeine 6.25-10 mg/5 ml 6.25-10 mg/5 mL syrup  Commonly known as: PHENERGAN with CODEINE  Take 5 mLs by mouth every 4 (four) hours as needed for Cough.  Ask about: Should I take this medication?            Indwelling Lines/Drains at time of discharge:   Lines/Drains/Airways     None                 Time spent on the discharge of patient: 34 minutes         Karlie Quigley MD  Department of Hospital Medicine  [facility] - Telemetry Stepdown

## 2023-03-28 LAB
ACID FAST MOD KINY STN SPEC: NORMAL
MYCOBACTERIUM SPEC QL CULT: NORMAL

## 2023-05-22 PROBLEM — J96.01 ACUTE HYPOXEMIC RESPIRATORY FAILURE: Status: RESOLVED | Noted: 2023-02-06 | Resolved: 2023-05-22
